# Patient Record
Sex: FEMALE | Race: WHITE | NOT HISPANIC OR LATINO | Employment: UNEMPLOYED | URBAN - METROPOLITAN AREA
[De-identification: names, ages, dates, MRNs, and addresses within clinical notes are randomized per-mention and may not be internally consistent; named-entity substitution may affect disease eponyms.]

---

## 2017-01-27 ENCOUNTER — COMMUNICATION - HEALTHEAST (OUTPATIENT)
Dept: FAMILY MEDICINE | Facility: CLINIC | Age: 37
End: 2017-01-27

## 2017-01-27 DIAGNOSIS — F41.1 GENERALIZED ANXIETY DISORDER: ICD-10-CM

## 2017-03-19 ENCOUNTER — COMMUNICATION - HEALTHEAST (OUTPATIENT)
Dept: FAMILY MEDICINE | Facility: CLINIC | Age: 37
End: 2017-03-19

## 2017-03-19 DIAGNOSIS — F41.1 GENERALIZED ANXIETY DISORDER: ICD-10-CM

## 2017-05-08 ENCOUNTER — COMMUNICATION - HEALTHEAST (OUTPATIENT)
Dept: FAMILY MEDICINE | Facility: CLINIC | Age: 37
End: 2017-05-08

## 2017-05-08 DIAGNOSIS — F41.1 GENERALIZED ANXIETY DISORDER: ICD-10-CM

## 2017-05-09 ENCOUNTER — COMMUNICATION - HEALTHEAST (OUTPATIENT)
Dept: FAMILY MEDICINE | Facility: CLINIC | Age: 37
End: 2017-05-09

## 2017-06-08 ENCOUNTER — OFFICE VISIT - HEALTHEAST (OUTPATIENT)
Dept: FAMILY MEDICINE | Facility: CLINIC | Age: 37
End: 2017-06-08

## 2017-06-08 DIAGNOSIS — F41.1 GENERALIZED ANXIETY DISORDER: ICD-10-CM

## 2017-06-08 ASSESSMENT — MIFFLIN-ST. JEOR: SCORE: 1360.78

## 2017-06-22 ENCOUNTER — COMMUNICATION - HEALTHEAST (OUTPATIENT)
Dept: FAMILY MEDICINE | Facility: CLINIC | Age: 37
End: 2017-06-22

## 2017-12-17 ENCOUNTER — COMMUNICATION - HEALTHEAST (OUTPATIENT)
Dept: FAMILY MEDICINE | Facility: CLINIC | Age: 37
End: 2017-12-17

## 2017-12-17 DIAGNOSIS — F41.1 GENERALIZED ANXIETY DISORDER: ICD-10-CM

## 2018-06-05 ENCOUNTER — COMMUNICATION - HEALTHEAST (OUTPATIENT)
Dept: FAMILY MEDICINE | Facility: CLINIC | Age: 38
End: 2018-06-05

## 2018-06-05 DIAGNOSIS — F41.1 GENERALIZED ANXIETY DISORDER: ICD-10-CM

## 2018-07-15 ENCOUNTER — COMMUNICATION - HEALTHEAST (OUTPATIENT)
Dept: FAMILY MEDICINE | Facility: CLINIC | Age: 38
End: 2018-07-15

## 2018-07-16 ENCOUNTER — COMMUNICATION - HEALTHEAST (OUTPATIENT)
Dept: FAMILY MEDICINE | Facility: CLINIC | Age: 38
End: 2018-07-16

## 2018-09-03 ENCOUNTER — COMMUNICATION - HEALTHEAST (OUTPATIENT)
Dept: FAMILY MEDICINE | Facility: CLINIC | Age: 38
End: 2018-09-03

## 2018-09-03 DIAGNOSIS — F41.1 GENERALIZED ANXIETY DISORDER: ICD-10-CM

## 2018-10-01 ENCOUNTER — OFFICE VISIT - HEALTHEAST (OUTPATIENT)
Dept: FAMILY MEDICINE | Facility: CLINIC | Age: 38
End: 2018-10-01

## 2018-10-01 DIAGNOSIS — G47.00 INSOMNIA: ICD-10-CM

## 2018-10-01 DIAGNOSIS — Z00.00 ROUTINE GENERAL MEDICAL EXAMINATION AT A HEALTH CARE FACILITY: ICD-10-CM

## 2018-10-01 DIAGNOSIS — F41.1 GENERALIZED ANXIETY DISORDER: ICD-10-CM

## 2018-10-01 ASSESSMENT — MIFFLIN-ST. JEOR: SCORE: 1383.46

## 2018-10-11 ENCOUNTER — COMMUNICATION - HEALTHEAST (OUTPATIENT)
Dept: SCHEDULING | Facility: CLINIC | Age: 38
End: 2018-10-11

## 2018-10-11 ENCOUNTER — OFFICE VISIT - HEALTHEAST (OUTPATIENT)
Dept: FAMILY MEDICINE | Facility: CLINIC | Age: 38
End: 2018-10-11

## 2018-10-11 ENCOUNTER — COMMUNICATION - HEALTHEAST (OUTPATIENT)
Dept: FAMILY MEDICINE | Facility: CLINIC | Age: 38
End: 2018-10-11

## 2018-10-11 DIAGNOSIS — M25.561 ACUTE PAIN OF RIGHT KNEE: ICD-10-CM

## 2018-10-23 ENCOUNTER — COMMUNICATION - HEALTHEAST (OUTPATIENT)
Dept: FAMILY MEDICINE | Facility: CLINIC | Age: 38
End: 2018-10-23

## 2018-10-23 DIAGNOSIS — M25.569 KNEE PAIN: ICD-10-CM

## 2018-11-02 ENCOUNTER — AMBULATORY - HEALTHEAST (OUTPATIENT)
Dept: NURSING | Facility: CLINIC | Age: 38
End: 2018-11-02

## 2018-11-20 ENCOUNTER — COMMUNICATION - HEALTHEAST (OUTPATIENT)
Dept: FAMILY MEDICINE | Facility: CLINIC | Age: 38
End: 2018-11-20

## 2018-11-20 DIAGNOSIS — F41.1 GENERALIZED ANXIETY DISORDER: ICD-10-CM

## 2019-01-06 ENCOUNTER — COMMUNICATION - HEALTHEAST (OUTPATIENT)
Dept: FAMILY MEDICINE | Facility: CLINIC | Age: 39
End: 2019-01-06

## 2019-01-06 DIAGNOSIS — F41.1 GENERALIZED ANXIETY DISORDER: ICD-10-CM

## 2019-02-28 ENCOUNTER — COMMUNICATION - HEALTHEAST (OUTPATIENT)
Dept: FAMILY MEDICINE | Facility: CLINIC | Age: 39
End: 2019-02-28

## 2019-02-28 DIAGNOSIS — F41.1 GENERALIZED ANXIETY DISORDER: ICD-10-CM

## 2019-03-28 ENCOUNTER — COMMUNICATION - HEALTHEAST (OUTPATIENT)
Dept: FAMILY MEDICINE | Facility: CLINIC | Age: 39
End: 2019-03-28

## 2019-03-28 DIAGNOSIS — F51.01 PRIMARY INSOMNIA: ICD-10-CM

## 2019-04-13 ENCOUNTER — COMMUNICATION - HEALTHEAST (OUTPATIENT)
Dept: SCHEDULING | Facility: CLINIC | Age: 39
End: 2019-04-13

## 2019-04-29 ENCOUNTER — COMMUNICATION - HEALTHEAST (OUTPATIENT)
Dept: FAMILY MEDICINE | Facility: CLINIC | Age: 39
End: 2019-04-29

## 2019-04-29 DIAGNOSIS — F41.1 GENERALIZED ANXIETY DISORDER: ICD-10-CM

## 2019-06-10 ENCOUNTER — OFFICE VISIT - HEALTHEAST (OUTPATIENT)
Dept: PHYSICAL THERAPY | Facility: REHABILITATION | Age: 39
End: 2019-06-10

## 2019-06-10 DIAGNOSIS — G89.29 CHRONIC PAIN OF RIGHT KNEE: ICD-10-CM

## 2019-06-10 DIAGNOSIS — M25.561 CHRONIC PAIN OF RIGHT KNEE: ICD-10-CM

## 2019-06-10 DIAGNOSIS — M62.81 GENERALIZED MUSCLE WEAKNESS: ICD-10-CM

## 2019-06-10 DIAGNOSIS — M25.661 DECREASED RANGE OF MOTION (ROM) OF RIGHT KNEE: ICD-10-CM

## 2019-06-17 ENCOUNTER — COMMUNICATION - HEALTHEAST (OUTPATIENT)
Dept: FAMILY MEDICINE | Facility: CLINIC | Age: 39
End: 2019-06-17

## 2019-06-17 DIAGNOSIS — F41.1 GENERALIZED ANXIETY DISORDER: ICD-10-CM

## 2019-08-01 ENCOUNTER — OFFICE VISIT - HEALTHEAST (OUTPATIENT)
Dept: FAMILY MEDICINE | Facility: CLINIC | Age: 39
End: 2019-08-01

## 2019-08-01 DIAGNOSIS — F41.1 GENERALIZED ANXIETY DISORDER: ICD-10-CM

## 2019-08-05 ENCOUNTER — COMMUNICATION - HEALTHEAST (OUTPATIENT)
Dept: FAMILY MEDICINE | Facility: CLINIC | Age: 39
End: 2019-08-05

## 2019-08-19 ENCOUNTER — COMMUNICATION - HEALTHEAST (OUTPATIENT)
Dept: FAMILY MEDICINE | Facility: CLINIC | Age: 39
End: 2019-08-19

## 2019-08-26 ENCOUNTER — COMMUNICATION - HEALTHEAST (OUTPATIENT)
Dept: FAMILY MEDICINE | Facility: CLINIC | Age: 39
End: 2019-08-26

## 2019-08-26 DIAGNOSIS — F41.1 GENERALIZED ANXIETY DISORDER: ICD-10-CM

## 2019-09-12 ENCOUNTER — OFFICE VISIT - HEALTHEAST (OUTPATIENT)
Dept: FAMILY MEDICINE | Facility: CLINIC | Age: 39
End: 2019-09-12

## 2019-09-12 DIAGNOSIS — F41.1 GENERALIZED ANXIETY DISORDER: ICD-10-CM

## 2019-09-12 ASSESSMENT — ANXIETY QUESTIONNAIRES
6. BECOMING EASILY ANNOYED OR IRRITABLE: NEARLY EVERY DAY
4. TROUBLE RELAXING: NEARLY EVERY DAY
5. BEING SO RESTLESS THAT IT IS HARD TO SIT STILL: SEVERAL DAYS
IF YOU CHECKED OFF ANY PROBLEMS ON THIS QUESTIONNAIRE, HOW DIFFICULT HAVE THESE PROBLEMS MADE IT FOR YOU TO DO YOUR WORK, TAKE CARE OF THINGS AT HOME, OR GET ALONG WITH OTHER PEOPLE: SOMEWHAT DIFFICULT
GAD7 TOTAL SCORE: 17
1. FEELING NERVOUS, ANXIOUS, OR ON EDGE: NEARLY EVERY DAY
7. FEELING AFRAID AS IF SOMETHING AWFUL MIGHT HAPPEN: SEVERAL DAYS
3. WORRYING TOO MUCH ABOUT DIFFERENT THINGS: NEARLY EVERY DAY
2. NOT BEING ABLE TO STOP OR CONTROL WORRYING: NEARLY EVERY DAY

## 2019-09-25 ENCOUNTER — COMMUNICATION - HEALTHEAST (OUTPATIENT)
Dept: FAMILY MEDICINE | Facility: CLINIC | Age: 39
End: 2019-09-25

## 2019-09-25 DIAGNOSIS — F41.1 GENERALIZED ANXIETY DISORDER: ICD-10-CM

## 2019-10-27 ENCOUNTER — COMMUNICATION - HEALTHEAST (OUTPATIENT)
Dept: FAMILY MEDICINE | Facility: CLINIC | Age: 39
End: 2019-10-27

## 2019-10-27 DIAGNOSIS — F51.01 PRIMARY INSOMNIA: ICD-10-CM

## 2019-12-11 ENCOUNTER — COMMUNICATION - HEALTHEAST (OUTPATIENT)
Dept: FAMILY MEDICINE | Facility: CLINIC | Age: 39
End: 2019-12-11

## 2019-12-11 DIAGNOSIS — F41.1 GENERALIZED ANXIETY DISORDER: ICD-10-CM

## 2019-12-13 ENCOUNTER — COMMUNICATION - HEALTHEAST (OUTPATIENT)
Dept: FAMILY MEDICINE | Facility: CLINIC | Age: 39
End: 2019-12-13

## 2019-12-13 DIAGNOSIS — F41.1 GENERALIZED ANXIETY DISORDER: ICD-10-CM

## 2019-12-16 ENCOUNTER — COMMUNICATION - HEALTHEAST (OUTPATIENT)
Dept: FAMILY MEDICINE | Facility: CLINIC | Age: 39
End: 2019-12-16

## 2020-01-15 ENCOUNTER — COMMUNICATION - HEALTHEAST (OUTPATIENT)
Dept: FAMILY MEDICINE | Facility: CLINIC | Age: 40
End: 2020-01-15

## 2020-01-15 DIAGNOSIS — F41.1 GENERALIZED ANXIETY DISORDER: ICD-10-CM

## 2020-01-17 ENCOUNTER — COMMUNICATION - HEALTHEAST (OUTPATIENT)
Dept: FAMILY MEDICINE | Facility: CLINIC | Age: 40
End: 2020-01-17

## 2020-01-17 DIAGNOSIS — F41.1 GENERALIZED ANXIETY DISORDER: ICD-10-CM

## 2020-02-19 ENCOUNTER — COMMUNICATION - HEALTHEAST (OUTPATIENT)
Dept: FAMILY MEDICINE | Facility: CLINIC | Age: 40
End: 2020-02-19

## 2020-02-19 DIAGNOSIS — F41.1 GENERALIZED ANXIETY DISORDER: ICD-10-CM

## 2020-02-26 ENCOUNTER — OFFICE VISIT - HEALTHEAST (OUTPATIENT)
Dept: FAMILY MEDICINE | Facility: CLINIC | Age: 40
End: 2020-02-26

## 2020-02-26 DIAGNOSIS — F41.1 GENERALIZED ANXIETY DISORDER: ICD-10-CM

## 2020-02-26 DIAGNOSIS — Z00.00 ROUTINE GENERAL MEDICAL EXAMINATION AT A HEALTH CARE FACILITY: ICD-10-CM

## 2020-02-26 DIAGNOSIS — F51.01 PRIMARY INSOMNIA: ICD-10-CM

## 2020-02-26 DIAGNOSIS — E78.49 OTHER HYPERLIPIDEMIA: ICD-10-CM

## 2020-02-26 ASSESSMENT — ANXIETY QUESTIONNAIRES
GAD7 TOTAL SCORE: 17
3. WORRYING TOO MUCH ABOUT DIFFERENT THINGS: NEARLY EVERY DAY
4. TROUBLE RELAXING: NEARLY EVERY DAY
7. FEELING AFRAID AS IF SOMETHING AWFUL MIGHT HAPPEN: SEVERAL DAYS
2. NOT BEING ABLE TO STOP OR CONTROL WORRYING: MORE THAN HALF THE DAYS
IF YOU CHECKED OFF ANY PROBLEMS ON THIS QUESTIONNAIRE, HOW DIFFICULT HAVE THESE PROBLEMS MADE IT FOR YOU TO DO YOUR WORK, TAKE CARE OF THINGS AT HOME, OR GET ALONG WITH OTHER PEOPLE: VERY DIFFICULT
5. BEING SO RESTLESS THAT IT IS HARD TO SIT STILL: MORE THAN HALF THE DAYS
6. BECOMING EASILY ANNOYED OR IRRITABLE: NEARLY EVERY DAY
1. FEELING NERVOUS, ANXIOUS, OR ON EDGE: NEARLY EVERY DAY

## 2020-02-26 ASSESSMENT — MIFFLIN-ST. JEOR: SCORE: 1378.47

## 2020-03-04 ENCOUNTER — COMMUNICATION - HEALTHEAST (OUTPATIENT)
Dept: FAMILY MEDICINE | Facility: CLINIC | Age: 40
End: 2020-03-04

## 2020-03-16 ENCOUNTER — COMMUNICATION - HEALTHEAST (OUTPATIENT)
Dept: FAMILY MEDICINE | Facility: CLINIC | Age: 40
End: 2020-03-16

## 2020-03-16 DIAGNOSIS — F51.01 PRIMARY INSOMNIA: ICD-10-CM

## 2020-03-17 ENCOUNTER — COMMUNICATION - HEALTHEAST (OUTPATIENT)
Dept: SCHEDULING | Facility: CLINIC | Age: 40
End: 2020-03-17

## 2020-03-17 DIAGNOSIS — F41.1 GENERALIZED ANXIETY DISORDER: ICD-10-CM

## 2020-04-27 ENCOUNTER — COMMUNICATION - HEALTHEAST (OUTPATIENT)
Dept: SCHEDULING | Facility: CLINIC | Age: 40
End: 2020-04-27

## 2020-04-27 DIAGNOSIS — F41.1 GENERALIZED ANXIETY DISORDER: ICD-10-CM

## 2020-05-21 ENCOUNTER — COMMUNICATION - HEALTHEAST (OUTPATIENT)
Dept: FAMILY MEDICINE | Facility: CLINIC | Age: 40
End: 2020-05-21

## 2020-05-21 DIAGNOSIS — F41.1 GENERALIZED ANXIETY DISORDER: ICD-10-CM

## 2020-06-20 ENCOUNTER — COMMUNICATION - HEALTHEAST (OUTPATIENT)
Dept: FAMILY MEDICINE | Facility: CLINIC | Age: 40
End: 2020-06-20

## 2020-06-20 DIAGNOSIS — F41.1 GENERALIZED ANXIETY DISORDER: ICD-10-CM

## 2020-07-20 ENCOUNTER — COMMUNICATION - HEALTHEAST (OUTPATIENT)
Dept: FAMILY MEDICINE | Facility: CLINIC | Age: 40
End: 2020-07-20

## 2020-07-20 DIAGNOSIS — F41.1 GENERALIZED ANXIETY DISORDER: ICD-10-CM

## 2020-07-20 DIAGNOSIS — F51.01 PRIMARY INSOMNIA: ICD-10-CM

## 2020-07-23 ENCOUNTER — COMMUNICATION - HEALTHEAST (OUTPATIENT)
Dept: FAMILY MEDICINE | Facility: CLINIC | Age: 40
End: 2020-07-23

## 2020-07-23 DIAGNOSIS — F51.01 PRIMARY INSOMNIA: ICD-10-CM

## 2020-07-27 ENCOUNTER — AMBULATORY - HEALTHEAST (OUTPATIENT)
Dept: FAMILY MEDICINE | Facility: CLINIC | Age: 40
End: 2020-07-27

## 2020-07-27 DIAGNOSIS — F51.01 PRIMARY INSOMNIA: ICD-10-CM

## 2020-08-17 ENCOUNTER — COMMUNICATION - HEALTHEAST (OUTPATIENT)
Dept: FAMILY MEDICINE | Facility: CLINIC | Age: 40
End: 2020-08-17

## 2020-08-17 DIAGNOSIS — F41.1 GENERALIZED ANXIETY DISORDER: ICD-10-CM

## 2020-09-14 ENCOUNTER — COMMUNICATION - HEALTHEAST (OUTPATIENT)
Dept: FAMILY MEDICINE | Facility: CLINIC | Age: 40
End: 2020-09-14

## 2020-09-14 DIAGNOSIS — F41.1 GENERALIZED ANXIETY DISORDER: ICD-10-CM

## 2020-10-09 ENCOUNTER — COMMUNICATION - HEALTHEAST (OUTPATIENT)
Dept: FAMILY MEDICINE | Facility: CLINIC | Age: 40
End: 2020-10-09

## 2020-10-09 DIAGNOSIS — F41.1 GENERALIZED ANXIETY DISORDER: ICD-10-CM

## 2020-11-05 ENCOUNTER — COMMUNICATION - HEALTHEAST (OUTPATIENT)
Dept: FAMILY MEDICINE | Facility: CLINIC | Age: 40
End: 2020-11-05

## 2020-11-05 DIAGNOSIS — F41.1 GENERALIZED ANXIETY DISORDER: ICD-10-CM

## 2020-11-09 ENCOUNTER — COMMUNICATION - HEALTHEAST (OUTPATIENT)
Dept: FAMILY MEDICINE | Facility: CLINIC | Age: 40
End: 2020-11-09

## 2020-11-09 DIAGNOSIS — F51.01 PRIMARY INSOMNIA: ICD-10-CM

## 2020-12-01 ENCOUNTER — COMMUNICATION - HEALTHEAST (OUTPATIENT)
Dept: FAMILY MEDICINE | Facility: CLINIC | Age: 40
End: 2020-12-01

## 2020-12-01 DIAGNOSIS — F41.1 GENERALIZED ANXIETY DISORDER: ICD-10-CM

## 2020-12-29 ENCOUNTER — COMMUNICATION - HEALTHEAST (OUTPATIENT)
Dept: FAMILY MEDICINE | Facility: CLINIC | Age: 40
End: 2020-12-29

## 2020-12-29 DIAGNOSIS — F41.1 GENERALIZED ANXIETY DISORDER: ICD-10-CM

## 2021-01-25 ENCOUNTER — COMMUNICATION - HEALTHEAST (OUTPATIENT)
Dept: FAMILY MEDICINE | Facility: CLINIC | Age: 41
End: 2021-01-25

## 2021-01-25 DIAGNOSIS — F51.01 PRIMARY INSOMNIA: ICD-10-CM

## 2021-01-25 DIAGNOSIS — F41.1 GENERALIZED ANXIETY DISORDER: ICD-10-CM

## 2021-01-29 ENCOUNTER — COMMUNICATION - HEALTHEAST (OUTPATIENT)
Dept: FAMILY MEDICINE | Facility: CLINIC | Age: 41
End: 2021-01-29

## 2021-01-29 DIAGNOSIS — F41.1 GENERALIZED ANXIETY DISORDER: ICD-10-CM

## 2021-02-01 ENCOUNTER — OFFICE VISIT - HEALTHEAST (OUTPATIENT)
Dept: FAMILY MEDICINE | Facility: CLINIC | Age: 41
End: 2021-02-01

## 2021-02-01 ENCOUNTER — COMMUNICATION - HEALTHEAST (OUTPATIENT)
Dept: FAMILY MEDICINE | Facility: CLINIC | Age: 41
End: 2021-02-01

## 2021-02-01 DIAGNOSIS — N94.3 PMS (PREMENSTRUAL SYNDROME): ICD-10-CM

## 2021-02-01 DIAGNOSIS — F51.01 PRIMARY INSOMNIA: ICD-10-CM

## 2021-02-01 DIAGNOSIS — F41.1 GENERALIZED ANXIETY DISORDER: ICD-10-CM

## 2021-02-01 DIAGNOSIS — Z00.00 ROUTINE GENERAL MEDICAL EXAMINATION AT A HEALTH CARE FACILITY: ICD-10-CM

## 2021-02-01 LAB
CHOLEST SERPL-MCNC: 248 MG/DL
FASTING STATUS PATIENT QL REPORTED: YES
HDLC SERPL-MCNC: 69 MG/DL
LDLC SERPL CALC-MCNC: 161 MG/DL
TRIGL SERPL-MCNC: 92 MG/DL
TSH SERPL DL<=0.005 MIU/L-ACNC: 0.49 UIU/ML (ref 0.3–5)

## 2021-02-01 ASSESSMENT — ANXIETY QUESTIONNAIRES
5. BEING SO RESTLESS THAT IT IS HARD TO SIT STILL: NEARLY EVERY DAY
7. FEELING AFRAID AS IF SOMETHING AWFUL MIGHT HAPPEN: NEARLY EVERY DAY
3. WORRYING TOO MUCH ABOUT DIFFERENT THINGS: NEARLY EVERY DAY
GAD7 TOTAL SCORE: 21
1. FEELING NERVOUS, ANXIOUS, OR ON EDGE: NEARLY EVERY DAY
IF YOU CHECKED OFF ANY PROBLEMS ON THIS QUESTIONNAIRE, HOW DIFFICULT HAVE THESE PROBLEMS MADE IT FOR YOU TO DO YOUR WORK, TAKE CARE OF THINGS AT HOME, OR GET ALONG WITH OTHER PEOPLE: VERY DIFFICULT
4. TROUBLE RELAXING: NEARLY EVERY DAY
2. NOT BEING ABLE TO STOP OR CONTROL WORRYING: NEARLY EVERY DAY
6. BECOMING EASILY ANNOYED OR IRRITABLE: NEARLY EVERY DAY

## 2021-02-01 ASSESSMENT — PATIENT HEALTH QUESTIONNAIRE - PHQ9: SUM OF ALL RESPONSES TO PHQ QUESTIONS 1-9: 15

## 2021-02-01 ASSESSMENT — MIFFLIN-ST. JEOR: SCORE: 1377.56

## 2021-02-01 NOTE — ASSESSMENT & PLAN NOTE
The patient would like to give a trial to the NuvaRing to see if this would help mitigate PMS symptoms and some of the emotional lability and increased anxiety around that time.  A prescription was given with directions to skip the week off of the NuvaRing and insert a new one every 3 weeks.

## 2021-02-01 NOTE — ASSESSMENT & PLAN NOTE
The patient continues to have high levels of anxiety for which she takes Xanax sparingly as needed.  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  We have discussed and have tried SSRI medications in the past without good tolerability.  Continue as is and I encouraged her to try to connect with her therapist on a more regular basis.

## 2021-02-02 ENCOUNTER — COMMUNICATION - HEALTHEAST (OUTPATIENT)
Dept: FAMILY MEDICINE | Facility: CLINIC | Age: 41
End: 2021-02-02

## 2021-02-02 LAB
HPV SOURCE: NORMAL
HUMAN PAPILLOMA VIRUS 16 DNA: NEGATIVE
HUMAN PAPILLOMA VIRUS 18 DNA: NEGATIVE
HUMAN PAPILLOMA VIRUS FINAL DIAGNOSIS: NORMAL
HUMAN PAPILLOMA VIRUS OTHER HR: NEGATIVE
SPECIMEN DESCRIPTION: NORMAL

## 2021-02-10 LAB
BKR LAB AP ABNORMAL BLEEDING: NO
BKR LAB AP BIRTH CONTROL/HORMONES: NORMAL
BKR LAB AP CERVICAL APPEARANCE: NORMAL
BKR LAB AP GYN ADEQUACY: NORMAL
BKR LAB AP GYN INTERPRETATION: NORMAL
BKR LAB AP HPV REFLEX: NORMAL
BKR LAB AP LMP: NORMAL
BKR LAB AP PATIENT STATUS: NORMAL
BKR LAB AP PREVIOUS ABNORMAL: NORMAL
BKR LAB AP PREVIOUS NORMAL: 2015
HIGH RISK?: NO
PATH REPORT.COMMENTS IMP SPEC: NORMAL
RESULT FLAG (HE HISTORICAL CONVERSION): NORMAL

## 2021-02-20 ENCOUNTER — COMMUNICATION - HEALTHEAST (OUTPATIENT)
Dept: FAMILY MEDICINE | Facility: CLINIC | Age: 41
End: 2021-02-20

## 2021-02-20 DIAGNOSIS — F41.1 GENERALIZED ANXIETY DISORDER: ICD-10-CM

## 2021-02-24 ENCOUNTER — COMMUNICATION - HEALTHEAST (OUTPATIENT)
Dept: FAMILY MEDICINE | Facility: CLINIC | Age: 41
End: 2021-02-24

## 2021-03-02 ENCOUNTER — COMMUNICATION - HEALTHEAST (OUTPATIENT)
Dept: FAMILY MEDICINE | Facility: CLINIC | Age: 41
End: 2021-03-02

## 2021-03-02 DIAGNOSIS — F41.1 GENERALIZED ANXIETY DISORDER: ICD-10-CM

## 2021-03-04 ENCOUNTER — COMMUNICATION - HEALTHEAST (OUTPATIENT)
Dept: FAMILY MEDICINE | Facility: CLINIC | Age: 41
End: 2021-03-04

## 2021-03-17 ENCOUNTER — COMMUNICATION - HEALTHEAST (OUTPATIENT)
Dept: FAMILY MEDICINE | Facility: CLINIC | Age: 41
End: 2021-03-17

## 2021-03-19 ENCOUNTER — OFFICE VISIT - HEALTHEAST (OUTPATIENT)
Dept: FAMILY MEDICINE | Facility: CLINIC | Age: 41
End: 2021-03-19

## 2021-03-19 DIAGNOSIS — N94.3 PMS (PREMENSTRUAL SYNDROME): ICD-10-CM

## 2021-03-19 DIAGNOSIS — N92.6 IRREGULAR MENSTRUAL BLEEDING: ICD-10-CM

## 2021-03-23 ENCOUNTER — COMMUNICATION - HEALTHEAST (OUTPATIENT)
Dept: FAMILY MEDICINE | Facility: CLINIC | Age: 41
End: 2021-03-23

## 2021-03-23 DIAGNOSIS — F41.1 GENERALIZED ANXIETY DISORDER: ICD-10-CM

## 2021-03-23 DIAGNOSIS — F51.01 PRIMARY INSOMNIA: ICD-10-CM

## 2021-04-09 ENCOUNTER — COMMUNICATION - HEALTHEAST (OUTPATIENT)
Dept: FAMILY MEDICINE | Facility: CLINIC | Age: 41
End: 2021-04-09

## 2021-04-09 ENCOUNTER — OFFICE VISIT - HEALTHEAST (OUTPATIENT)
Dept: FAMILY MEDICINE | Facility: CLINIC | Age: 41
End: 2021-04-09

## 2021-04-09 DIAGNOSIS — R30.0 DYSURIA: ICD-10-CM

## 2021-04-09 DIAGNOSIS — N30.00 ACUTE CYSTITIS WITHOUT HEMATURIA: ICD-10-CM

## 2021-04-09 LAB
ALBUMIN UR-MCNC: NEGATIVE G/DL
APPEARANCE UR: ABNORMAL
BILIRUB UR QL STRIP: NEGATIVE
COLOR UR AUTO: YELLOW
GLUCOSE UR STRIP-MCNC: NEGATIVE MG/DL
HGB UR QL STRIP: ABNORMAL
KETONES UR STRIP-MCNC: NEGATIVE MG/DL
LEUKOCYTE ESTERASE UR QL STRIP: ABNORMAL
NITRATE UR QL: NEGATIVE
PH UR STRIP: 5.5 [PH] (ref 5–8)
SP GR UR STRIP: <=1.005 (ref 1–1.03)
UROBILINOGEN UR STRIP-ACNC: ABNORMAL

## 2021-04-12 LAB — BACTERIA SPEC CULT: ABNORMAL

## 2021-04-14 ENCOUNTER — COMMUNICATION - HEALTHEAST (OUTPATIENT)
Dept: FAMILY MEDICINE | Facility: CLINIC | Age: 41
End: 2021-04-14

## 2021-04-15 ENCOUNTER — COMMUNICATION - HEALTHEAST (OUTPATIENT)
Dept: FAMILY MEDICINE | Facility: CLINIC | Age: 41
End: 2021-04-15

## 2021-04-15 DIAGNOSIS — F41.1 GENERALIZED ANXIETY DISORDER: ICD-10-CM

## 2021-04-16 ENCOUNTER — OFFICE VISIT - HEALTHEAST (OUTPATIENT)
Dept: FAMILY MEDICINE | Facility: CLINIC | Age: 41
End: 2021-04-16

## 2021-04-16 DIAGNOSIS — N76.0 BACTERIAL VAGINITIS: ICD-10-CM

## 2021-04-16 DIAGNOSIS — B96.89 BACTERIAL VAGINITIS: ICD-10-CM

## 2021-04-16 DIAGNOSIS — R30.0 DYSURIA: ICD-10-CM

## 2021-04-16 LAB
ALBUMIN UR-MCNC: NEGATIVE G/DL
APPEARANCE UR: CLEAR
BACTERIA #/AREA URNS HPF: ABNORMAL /[HPF]
BILIRUB UR QL STRIP: NEGATIVE
CLUE CELLS: ABNORMAL
COLOR UR AUTO: YELLOW
GLUCOSE UR STRIP-MCNC: NEGATIVE MG/DL
HGB UR QL STRIP: ABNORMAL
KETONES UR STRIP-MCNC: NEGATIVE MG/DL
LEUKOCYTE ESTERASE UR QL STRIP: ABNORMAL
NITRATE UR QL: NEGATIVE
PH UR STRIP: 6 [PH] (ref 5–8)
RBC #/AREA URNS AUTO: ABNORMAL HPF
SP GR UR STRIP: <=1.005 (ref 1–1.03)
SQUAMOUS #/AREA URNS AUTO: ABNORMAL LPF
TRICHOMONAS, WET PREP: ABNORMAL
UROBILINOGEN UR STRIP-ACNC: ABNORMAL
WBC #/AREA URNS AUTO: ABNORMAL HPF
YEAST, WET PREP: ABNORMAL

## 2021-04-17 LAB — BACTERIA SPEC CULT: NO GROWTH

## 2021-04-19 ENCOUNTER — COMMUNICATION - HEALTHEAST (OUTPATIENT)
Dept: FAMILY MEDICINE | Facility: CLINIC | Age: 41
End: 2021-04-19

## 2021-04-19 DIAGNOSIS — B96.89 BACTERIAL VAGINITIS: ICD-10-CM

## 2021-04-19 DIAGNOSIS — N76.0 BACTERIAL VAGINITIS: ICD-10-CM

## 2021-04-19 DIAGNOSIS — F41.1 GENERALIZED ANXIETY DISORDER: ICD-10-CM

## 2021-04-20 ENCOUNTER — AMBULATORY - HEALTHEAST (OUTPATIENT)
Dept: FAMILY MEDICINE | Facility: CLINIC | Age: 41
End: 2021-04-20

## 2021-04-20 DIAGNOSIS — F41.1 GENERALIZED ANXIETY DISORDER: ICD-10-CM

## 2021-04-20 LAB
C TRACH DNA SPEC QL PROBE+SIG AMP: NEGATIVE
N GONORRHOEA DNA SPEC QL NAA+PROBE: NEGATIVE

## 2021-05-13 ENCOUNTER — COMMUNICATION - HEALTHEAST (OUTPATIENT)
Dept: FAMILY MEDICINE | Facility: CLINIC | Age: 41
End: 2021-05-13

## 2021-05-13 ENCOUNTER — COMMUNICATION - HEALTHEAST (OUTPATIENT)
Dept: ADMINISTRATIVE | Facility: CLINIC | Age: 41
End: 2021-05-13

## 2021-05-13 DIAGNOSIS — F51.01 PRIMARY INSOMNIA: ICD-10-CM

## 2021-05-13 DIAGNOSIS — F41.1 GENERALIZED ANXIETY DISORDER: ICD-10-CM

## 2021-05-13 RX ORDER — ZOLPIDEM TARTRATE 5 MG/1
TABLET ORAL
Qty: 15 TABLET | Refills: 0 | Status: SHIPPED | OUTPATIENT
Start: 2021-05-13 | End: 2021-08-29

## 2021-05-27 ASSESSMENT — PATIENT HEALTH QUESTIONNAIRE - PHQ9: SUM OF ALL RESPONSES TO PHQ QUESTIONS 1-9: 15

## 2021-05-27 NOTE — TELEPHONE ENCOUNTER
data    Zolpedem  11/23/18 - 30 7/16/18 - 30    Alprazolam  3/1/19 - 30  1/7/19 - 30  11/23/18- 30

## 2021-05-27 NOTE — TELEPHONE ENCOUNTER
Controlled Substance Refill Request  Medication:   Requested Prescriptions     Pending Prescriptions Disp Refills     zolpidem (AMBIEN) 5 MG tablet [Pharmacy Med Name: ZOLPIDEM TARTRATE 5 MG TABLET] 90 tablet      Sig: TAKE 1/2 TO 1 TABLET BY MOUTH AT BEDTIME AS NEEDED     Date Last Fill: 11/23/18  Pharmacy: [facility]  Submit electronically to pharmacy  Controlled Substance Agreement on File:   Encounter-Level CSA Scan Date:    There are no encounter-level csa scan date.       Last office visit: 6/8/2017 Lavonne Fernandez MD

## 2021-05-27 NOTE — TELEPHONE ENCOUNTER
"Ear pain and throat soreness one side. For about two weeks now. \"Annoying.\" No fever.         Reason for Disposition    Earache  (Exceptions: brief ear pain of < 60 minutes duration, earache occurring during air travel    Protocols used: EARACHE-A-AH      "

## 2021-05-28 ASSESSMENT — ANXIETY QUESTIONNAIRES
GAD7 TOTAL SCORE: 21
GAD7 TOTAL SCORE: 17

## 2021-05-28 NOTE — TELEPHONE ENCOUNTER
Controlled Substance Refill Request  Medication:   Requested Prescriptions     Pending Prescriptions Disp Refills     ALPRAZolam (XANAX) 1 MG tablet [Pharmacy Med Name: ALPRAZOLAM 1 MG TABLET] 30 tablet 0     Sig: TAKE 1 TABLET BY MOUTH TWICE A DAY     Date Last Fill: 3/1/19  [id number]  Submit electronically to pharmacy  Controlled Substance Agreement on File:   Encounter-Level CSA Scan Date:    There are no encounter-level csa scan date.       Last office visit: Last office visit pertaining to requested medication was 10/11/18.

## 2021-05-29 NOTE — TELEPHONE ENCOUNTER
Controlled Substance Refill Request  Medication:   Requested Prescriptions     Pending Prescriptions Disp Refills     ALPRAZolam (XANAX) 1 MG tablet [Pharmacy Med Name: ALPRAZOLAM 1 MG TABLET] 30 tablet 0     Sig: TAKE 1 TABLET BY MOUTH TWICE A DAY     Date Last Fill: 4/30/19  [facility]   [facility] to [facility]    Controlled Substance Agreement on File:   Encounter-Level CSA Scan Date:    There are no encounter-level csa scan date.       Last office visit with primary: Visit date not found

## 2021-05-29 NOTE — PROGRESS NOTES
[facility] Discharge Summary    Patient Name: Merle Jennings  Date: 2/10/2020  Referring provider: Lavonne Fernandez MD  Visit Diagnosis:     ICD-10-CM    1. Chronic pain of right knee M25.561     G89.29    2. Generalized muscle weakness M62.81    3. Decreased range of motion (ROM) of right knee M25.661        Goal Status:  See status in note below.    Patient was seen for 1 visit and did not return.    Therapy will be discontinued at this time.  The patient will need a new referral to resume.    Thank you for your referral.  Maggi Buck PT, DPT, OCS, CLT  2/10/2020   9:28 AM      [facility]   Initial Evaluation    Patient Name: Merle Jennings  Date of evaluation: 6/10/2019  Visit number: 1/4  Referring Provider: Lavonne Fernandez MD  Referring Diagnosis: Knee pain  Visit Diagnosis:     ICD-10-CM    1. Chronic pain of right knee M25.561     G89.29    2. Generalized muscle weakness M62.81    3. Decreased range of motion (ROM) of right knee M25.661        Assessment:        Merle Jennings is a 39 y.o. female who presents to therapy today with chief complaints of R knee pain with intermittent low back pain with some R radiating leg pain. Onset date of sx was Nov 2018 when pt was doing yoga and felt something pull in her R knee.  Pt reported h/o back pain and migraines that were both helped with yoga in the past but pt hadn't been performing due to knee injury.  Pain symptoms are mild to moderate in low back, R hip and can be in R knee.  Functional impairments include difficulty and pain with prolonged standing or walking, bed mobility, bending, kneeling/squatting, house and yard work and performing yoga.  Pt demo's signs and sx consistent with R knee jt capsular hypomobility with mild B hip weakness and mild R LE altered neurodynamics.     Pt. is appropriate for skilled PT intervention as outlined in the Plan of Care (POC).  Pt. is a good candidate for skilled PT services to  improve pain levels and function.    Goals:  Pt. will demonstrate/verbalize independence in self-management of condition in : 12 weeks  Pt. will be independent with home exercise program in : 12 weeks;Comment  Comment:: strength training and yoga with minimal pain or difficulty for improved QOL    Patient's expectations/goals are realistic.    Barriers to Learning or Achieving Goals:  No Barriers.       Plan / Patient Instructions:     Plan of Care:   Communication with: Referral Source  Patient Related Instruction: Nature of Condition;Treatment plan and rationale;Self Care instruction;Basis of treatment;Body mechanics;Posture;Precautions;Next steps;Expected outcome  Times per Week: 1x/every 3-4 weeks  Number of Weeks: 12  Number of Visits: 4  Discharge Planning: when indicated  Precautions / Restrictions : none  Therapeutic Exercise: ROM;Stretching;Strengthening  Neuromuscular Reeducation: posture;TNE;core;other  Neuromuscular Re-education: neurodynamics  Manual Therapy: soft tissue mobilization;joint mobilization;muscle energy  Functional Training (ADL's): self care;ADL's    Plan for next visit: Assess response to knee stretch, hip strengthening and R LE nerve glide.    Treatment techniques, plan of care, and goals were discussed with the patient.  The patient agrees to the plan as outlined.  The plan of care is dynamic and will be modified on an ongoing basis.       Subjective:       Social information:   Occupation: property management   Work Status:Working full time    History of Present Illness:  In November pt was doing yoga and heard her knee have some sort of tear or rip. Pt reports she did some PT sessions and got a home program and took a little break over the past couple of months because it felt like it was getting more aggravating.   Pt returns to PT now looking for a new program to perform to help her knee feel more flexible and body be pain free.  Pt reports h/o back pain that had gotten better with  "doing yoga in the past but she hasn't been doing yoga much since November when she hurt her knee.  Pt reports she used to get more back pain before but now better with sleep number and using pillows to prop.  Pain Ratin  Pain rating at best: 0  Pain rating at worst: 6  Pain description: aching, pain and soreness    Patient reports benefit from:  heat, cold, yoga       Objective:      Patient Outcome Measures :    Lower Extremity Functional Scale (_/80): 49     Scores range from 0-80, where a score of 80 represents maximum function. The minimal clinically important difference is a positive change of 9 points.    Precautions/Restrictions: None  Involved side: Right and Bilateral  Posture Observation:      General sitting posture is  normal.    Gait: Amb with B trendelenberg R > L    ROM: Trunk flex WNL with tightness only, ext min limited with tightness, B SB mod limited with fingertips to superior knee jt with contra hip flexor tightness and L back tightness    L knee ROM about 5 degrees hyperext with 145 degrees flex  R knee ROM about 2-3 degrees hyperext with 130 degrees flex AROM 142 AAROM - uncomfortable    WNL B hip ROM except min tight with R IR    Strength: B LE grossly 5/5 with some awareness of stress R patellar tendon with quad testing  R hip abd and ext 4/5, L hip 4+/5 - with mild pull into back    Sensation: Intact light touch R knee/hip    Special tests: Mild positive R SLR for neural tension with hip flex, add and IR    Treatment Today      TREATMENT MINUTES COMMENTS   Evaluation 30 R Knee, back   Self-care/ Home management     Manual therapy     Neuromuscular Re-education 10 Discussed neural anatomy mechanics and added nerve glide to home program per pt instructions and printed for home.   Therapeutic Activity     Therapeutic Exercises 20 Discussed properties of \"tightness\" and how strengthening is important. Performed and initiated HEP per pt instructions and printed for home. Discussed importance " of goal writing and scheduling exercise to help with motivation to perform.   Gait training     Modality__________________                Total 60    Blank areas are intentional and mean the treatment did not include these items.        PT Evaluation Code: (Please list factors)  Patient History/Comorbidities: h/o knee pain x8 months  Examination: R knee, back  Clinical Presentation: stable  Clinical Decision Making: low    Patient History/  Comorbidities Examination  (body structures and functions, activity limitations, and/or participation restrictions) Clinical Presentation Clinical Decision Making (Complexity)   No documented Comorbidities or personal factors 1-2 Elements Stable and/or uncomplicated Low   1-2 documented comorbidities or personal factor 3 Elements Evolving clinical presentation with changing characteristics Moderate   3-4 documented comorbidities or personal factors 4 or more Unstable and unpredictable High       Maggi Buck PT, DPT, OCS, CLT  6/10/2019  10:10 AM

## 2021-05-31 VITALS — WEIGHT: 147.3 LBS | HEIGHT: 67 IN | BODY MASS INDEX: 23.12 KG/M2
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
## 2021-05-31 NOTE — TELEPHONE ENCOUNTER
Per [place] database:    Last fill    08/01/2019  #30  06/19/2019  #30  04/30/2019  #30    Pt is also using:  Zolpidem Tartrate 5 Mg Tablet     Kusum Hayden LPN

## 2021-05-31 NOTE — TELEPHONE ENCOUNTER
Question following Office Visit  When did you see your provider: 8/1/19  What is your question: Patient stated I had to discontinue the Buspar due feeling nausea, dizziness, plugged ears and very [place] after about 2 weeks. I am also having a very stressful time with my job right now and this was interfering with this.  I am willing to try this medication again but not right now and I am scheduled for an appointment in September.   Okay to leave a detailed message: Yes

## 2021-05-31 NOTE — TELEPHONE ENCOUNTER
Controlled Substance Refill Request  Medication Name:   Requested Prescriptions     Pending Prescriptions Disp Refills     ALPRAZolam (XANAX) 1 MG tablet 30 tablet 1     Sig: Take 1 tablet PO daily PRN anxiety (max 6 per week)     Date Last Fill: 8/1/2019  [facility]      Submit electronically to pharmacy  Controlled Substance Agreement Date Scanned:   Encounter-Level CSA Scan Date:    There are no encounter-level csa scan date.       Last office visit with prescriber/PCP: 8/1/2019 Lavonne Fernandez MD OR same dept: 8/1/2019 Lavonne Fernandez MD OR same specialty: 8/1/2019 Lavonne Fernandez MD  Last physical: 10/1/2018 Last MTM visit: Visit date not found      Patient states would like filled at least by Saturday due to the Holiday weekend.

## 2021-05-31 NOTE — TELEPHONE ENCOUNTER
"Medication Question or Clarification  Who is calling: Patient  What medication are you calling about? (include dose and sig)    Disp Refills Start End    busPIRone (BUSPAR) 7.5 MG tablet 120 tablet 1 8/1/2019     Sig - Route: Take 2 tablets (15 mg total) by mouth 2 (two) times a day. - Oral    Sent to pharmacy as: busPIRone (BUSPAR) 7.5 MG tablet    E-Prescribing Status: Receipt confirmed by pharmacy (8/1/2019  1:44 PM         Who prescribed the medication?: Lavonne Fernandez MD      What is your question/concern?: Patient started the medication as prescribed, did not feel well on the 2 pills two times a day, felt hung off and just needed to go to bed. Spoke with the pharmacist ,who suggested to start a 1 pill two times a day, to build up to the 2 pills two times a day,  Patient feels a little better on 1 pill however is still \"tingly\".  Asking what to do and how to take if should continue with this medication. Would appreciate a call back today before needs to take evening dose.    Pharmacy: [facility] #[id number]  Okay to leave a detailed message?: Yes  Site CMT - Please call the pharmacy to obtain any additional needed information.  "

## 2021-06-01 NOTE — TELEPHONE ENCOUNTER
Medication Question or Clarification  Who is calling: Patient  What medication are you calling about? (include dose and sig)    Disp Refills Start End    ALPRAZolam (XANAX) 1 MG tablet 30 tablet 1 9/25/2019     Sig - Route: Take 1 tablet (1 mg total) by mouth 2 (two) times a day as needed for anxiety (max 6 per week). Take 1 tablet PO daily PRN anxiety (max 6 per week) - Oral    Class: Print    Notes to Pharmacy: Not to exceed 5 additional fills before 10/27/2019      Who prescribed the medication?: Lavonne Fernandez MD   What is your question/concern?: Patient stated that she is having problem filling this medication. Patient stated that she would preferred to go back to the old sig so she won't have trouble filling this medication again.  [id number]  Okay to leave a detailed message?: Yes, [phone]  Site CMT - Please call the pharmacy to obtain any additional needed information.
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
## 2021-06-02 ENCOUNTER — RECORDS - HEALTHEAST (OUTPATIENT)
Dept: ADMINISTRATIVE | Facility: CLINIC | Age: 41
End: 2021-06-02

## 2021-06-02 VITALS — WEIGHT: 152.3 LBS | BODY MASS INDEX: 23.9 KG/M2 | HEIGHT: 67 IN

## 2021-06-02 NOTE — TELEPHONE ENCOUNTER
Controlled Substance Refill Request  Medication:   Requested Prescriptions     Pending Prescriptions Disp Refills     zolpidem (AMBIEN) 5 MG tablet [Pharmacy Med Name: ZOLPIDEM TARTRATE 5 MG TABLET] 30 tablet      Sig: TAKE 0.5 TO 1 TABLET BY MOUTH AT BEDTIME AS NEEDED     Not on current medication list.     Stephanie Roberson RN Triage Nurse Advisor 8:26 PM

## 2021-06-03 VITALS — SYSTOLIC BLOOD PRESSURE: 100 MMHG | HEART RATE: 72 BPM | DIASTOLIC BLOOD PRESSURE: 68 MMHG

## 2021-06-04 VITALS
DIASTOLIC BLOOD PRESSURE: 78 MMHG | HEART RATE: 68 BPM | WEIGHT: 151.2 LBS | SYSTOLIC BLOOD PRESSURE: 136 MMHG | HEIGHT: 67 IN | BODY MASS INDEX: 23.73 KG/M2

## 2021-06-04 NOTE — TELEPHONE ENCOUNTER
Patient stated that she wanted to switch medication to [facility]. Writer did update pharmacy in patient's chart.       Controlled Substance Refill Request  Medication Name:   Requested Prescriptions     Pending Prescriptions Disp Refills     ALPRAZolam (XANAX) 1 MG tablet 30 tablet 1     Sig: Take 1 tablet (1 mg total) by mouth daily as needed for anxiety (max 6 per week). Take 1 tablet PO daily PRN anxiety (max 6 per week)     Date Last Fill: 0925/19  Pharmacy: [id number]      Submit electronically to pharmacy  Controlled Substance Agreement Date Scanned:   Encounter-Level CSA Scan Date:    There are no encounter-level csa scan date.       Last office visit with prescriber/PCP: 9/12/2019 Lavonne Fernandez MD OR same dept: 9/12/2019 Lavonne Fernandez MD OR same specialty: 9/12/2019 Lavonne Fernandez MD  Last physical: 10/1/2018 Last MTM visit: Visit date not found

## 2021-06-04 NOTE — TELEPHONE ENCOUNTER
The most recent clinic visit that this patient had with Dr. Fernandez was reviewed prior to refill.  Dr. Fernandez wanted to see the patient back in 3 months which would be the latter half of December early January.  Refill will be given.  Please schedule patient for follow-up visit.

## 2021-06-04 NOTE — TELEPHONE ENCOUNTER
Per [place] database:    Last fill    11/08/2019  #30  10/05/2019  #30  09/03/2019  #30    Pt is also using  Zolpidem Tartrate 5 Mg Tablet     No other meds noted  No fills noted in [place]    Kusum Hayden LPN

## 2021-06-05 VITALS
DIASTOLIC BLOOD PRESSURE: 76 MMHG | SYSTOLIC BLOOD PRESSURE: 136 MMHG | OXYGEN SATURATION: 99 % | WEIGHT: 151 LBS | HEART RATE: 82 BPM | HEIGHT: 67 IN | BODY MASS INDEX: 23.7 KG/M2

## 2021-06-05 VITALS
TEMPERATURE: 98.1 F | SYSTOLIC BLOOD PRESSURE: 136 MMHG | RESPIRATION RATE: 16 BRPM | HEART RATE: 80 BPM | DIASTOLIC BLOOD PRESSURE: 80 MMHG | BODY MASS INDEX: 23.65 KG/M2 | HEIGHT: 67 IN

## 2021-06-05 VITALS
HEART RATE: 100 BPM | DIASTOLIC BLOOD PRESSURE: 80 MMHG | RESPIRATION RATE: 16 BRPM | SYSTOLIC BLOOD PRESSURE: 130 MMHG | TEMPERATURE: 98.9 F

## 2021-06-05 NOTE — TELEPHONE ENCOUNTER
Controlled Substance Refill Request  Medication Name:   Requested Prescriptions     Pending Prescriptions Disp Refills     ALPRAZolam (XANAX) 1 MG tablet [Pharmacy Med Name: ALPRAZolam 1 MG Oral Tablet] 30 tablet 0     Sig: TAKE 1 TABLET BY MOUTH ONCE DAILY AS NEEDED FOR ANXIETY . DO NOT EXCEED 6 PER  WEEK     Date Last Fill: 12/13/19  Requested Pharmacy: [facility]  Submit electronically to pharmacy  Controlled Substance Agreement on file:   Encounter-Level CSA Scan Date:    There are no encounter-level csa scan date.        Last office visit:  9/12/19

## 2021-06-06 NOTE — TELEPHONE ENCOUNTER
1. Medication Request  Medication name: Deodorant  Requested Pharmacy: [facility] #[id number]  Reason for request: Odor armpits  When did you use medication last?:  n/a  Patient offered appointment:  patient declined  Okay to leave a detailed message: yes  [phone]      2. Medication Request  Medication name: Latisse (help eyelashes grow)  Requested Pharmacy: [facility] #[id number]  Reason for request: Help eyelashes grow  When did you use medication last?:  n/a  Patient offered appointment:  patient declined  Okay to leave a detailed message: yes  [phone]    FYI: Patient is questioning what are the side effects for the above medications. Patient stated that she is aware that she can buy Latisse OTC but it is pricey and wondering if it would be cheaper if it was prescribed.
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
## 2021-06-06 NOTE — TELEPHONE ENCOUNTER
Controlled Substance Refill Request  Medication Name:   Requested Prescriptions     Pending Prescriptions Disp Refills     ALPRAZolam (XANAX) 1 MG tablet 30 tablet 0     Date Last Fill: 2/24/20  Requested Pharmacy: [id number]  Submit electronically to pharmacy  Controlled Substance Agreement on file:   Encounter-Level CSA Scan Date:    There are no encounter-level csa scan date.        Last office visit:  2/26/20

## 2021-06-06 NOTE — TELEPHONE ENCOUNTER
I would not feel comfortable prescribing Latisse as I do not have any experience with this and it is a cosmetic issue; dermatology would be my suggestion. I also am not sure what the deodorant request is for; do OTC not work? Does she have excessive sweating? We do not have a prescription deodorant but do have a stronger topical for hyperhydrosis or excessive sweating

## 2021-06-06 NOTE — TELEPHONE ENCOUNTER
Controlled Substance Refill Request  Medication Name:   Requested Prescriptions     Pending Prescriptions Disp Refills     ALPRAZolam (XANAX) 1 MG tablet [Pharmacy Med Name: ALPRAZolam 1 MG Oral Tablet] 30 tablet 0     Sig: TAKE 1 TABLET BY MOUTH ONCE DAILY AS NEEDED FOR ANXIETY .  DO  NOT  EXCEED  6  PER  WEEK.     Date Last Fill: 1/17/2020  Requested [facility]acy: [facility]  Submit electronically to pharmacy  Controlled Substance Agreement on file:   Encounter-Level CSA Scan Date:    There are no encounter-level csa scan date.        Last office visit:  9/12/19    Jayna Lopez RN  Triage Nurse Advisor

## 2021-06-07 NOTE — TELEPHONE ENCOUNTER
Controlled Substance Refill Request  Medication Name:   Requested Prescriptions     Pending Prescriptions Disp Refills     ALPRAZolam (XANAX) 1 MG tablet [Pharmacy Med Name: ALPRAZolam 1 MG Oral Tablet] 30 tablet 0     Sig: TAKE 1 TABLET BY MOUTH ONCE DAILY AS NEEDED FOR ANXIETY .  DO  NOT  EXCEED  6  TABS  PER  WEEK.     Date Last Fill: 3/18/20  Requested [facility]acy: [facility]  Submit electronically to pharmacy  Controlled Substance Agreement on file:   Encounter-Level CSA Scan Date:    There are no encounter-level csa scan date.        Last office visit:  2/26/20

## 2021-06-09 NOTE — TELEPHONE ENCOUNTER
Controlled Substance Refill Request  Medication Name:   Requested Prescriptions     Pending Prescriptions Disp Refills     zolpidem (AMBIEN) 5 MG tablet [Pharmacy Med Name: Zolpidem Tartrate 5 MG Oral Tablet] 15 tablet 0     Sig: TAKE 1/2 TO 1 (ONE-HALF TO ONE) TABLET BY MOUTH AT BEDTIME AS NEEDED     Date Last Fill: 07/20/2020.  Is patient out of medication?: N/A - electronic request  Patient notified refills processed within 3 business days:  Yes  Requested Pharmacy: [facility] [address] [place] [address]  Submit electronically to pharmacy  Controlled Substance Agreement on file:   Encounter-Level CSA Scan Date:    There are no encounter-level csa scan date.        Last office visit:  02/26/2020 with PCP.

## 2021-06-09 NOTE — TELEPHONE ENCOUNTER
Controlled Substance Refill Request  Medication Name:   Requested Prescriptions     Pending Prescriptions Disp Refills     ALPRAZolam (XANAX) 1 MG tablet [Pharmacy Med Name: ALPRAZolam 1 MG Oral Tablet] 30 tablet 0     Sig: TAKE 1 TABLET BY MOUTH ONCE DAILY AS NEEDED FOR ANXIETY DO  NOT  EXCEED  7  TABS  PER  WEEK     zolpidem (AMBIEN) 5 MG tablet [Pharmacy Med Name: Zolpidem Tartrate 5 MG Oral Tablet] 15 tablet 0     Sig: TAKE 1/2 TO 1 (ONE-HALF TO ONE) TABLET BY MOUTH AT BEDTIME AS NEEDED     Date Last Fill: 6/22/20 xanax; 3/16/20 ambien  Requested Pharmacy: [facility]  Submit electronically to pharmacy  Controlled Substance Agreement on file:   Encounter-Level CSA Scan Date:    There are no encounter-level csa scan date.        Last office visit:  2/26/20

## 2021-06-10 ENCOUNTER — COMMUNICATION - HEALTHEAST (OUTPATIENT)
Dept: FAMILY MEDICINE | Facility: CLINIC | Age: 41
End: 2021-06-10

## 2021-06-10 DIAGNOSIS — F41.1 GENERALIZED ANXIETY DISORDER: ICD-10-CM

## 2021-06-10 RX ORDER — ALPRAZOLAM 1 MG
TABLET ORAL
Qty: 30 TABLET | Refills: 0 | Status: SHIPPED | OUTPATIENT
Start: 2021-06-10 | End: 2021-08-05

## 2021-06-10 NOTE — TELEPHONE ENCOUNTER
Controlled Substance Refill Request  Medication Name:   Requested Prescriptions     Pending Prescriptions Disp Refills     ALPRAZolam (XANAX) 1 MG tablet [Pharmacy Med Name: ALPRAZolam 1 MG Oral Tablet] 30 tablet 0     Sig: TAKE 1 TABLET BY MOUTH ONCE DAILY AS NEEDED FOR ANXIETY DO  NOT  EXCEED  7  TABS  PER  WEEK     Date Last Fill: 7/20/20  Requested [facility]acy: [facility]  Submit electronically to pharmacy  Controlled Substance Agreement on file:   Encounter-Level CSA Scan Date:    There are no encounter-level csa scan date.        Last office visit:  2/26/20

## 2021-06-11 NOTE — TELEPHONE ENCOUNTER
Controlled Substance Refill Request  Medication Name:   Requested Prescriptions     Pending Prescriptions Disp Refills     ALPRAZolam (XANAX) 1 MG tablet [Pharmacy Med Name: ALPRAZolam 1 MG Oral Tablet] 30 tablet 0     Sig: TAKE 1 TABLET BY MOUTH ONCE DAILY AS NEEDED FOR ANXIETY DO  NOT  EXCEED  7  TABS  PER  WEEK     Date Last Fill: 8/18/20  Requested [facility]acy: [facility]  Submit electronically to pharmacy  Controlled Substance Agreement on file:   Encounter-Level CSA Scan Date:    There are no encounter-level csa scan date.        Last office visit:  2/26/20
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
## 2021-06-12 NOTE — TELEPHONE ENCOUNTER
Controlled Substance Refill Request  Medication Name:   Requested Prescriptions     Pending Prescriptions Disp Refills     ALPRAZolam (XANAX) 1 MG tablet [Pharmacy Med Name: ALPRAZolam 1 MG Oral Tablet] 30 tablet 0     Sig: TAKE 1 TABLET BY MOUTH ONCE DAILY AS NEEDED FOR ANXIETY **  DO  NOT  EXCEED  7  TABLETS  PER  WEEK**     Date Last Fill: 10/12/20  Requested [facility]acy: [facility]  Submit electronically to pharmacy  Controlled Substance Agreement on file:   Encounter-Level CSA Scan Date:    There are no encounter-level csa scan date.        Last office visit:  2/26/20

## 2021-06-12 NOTE — TELEPHONE ENCOUNTER
Controlled Substance Refill Request  Medication Name:   Requested Prescriptions     Pending Prescriptions Disp Refills     ALPRAZolam (XANAX) 1 MG tablet [Pharmacy Med Name: ALPRAZolam 1 MG Oral Tablet] 30 tablet 0     Sig: TAKE 1 TABLET BY MOUTH ONCE DAILY AS NEEDED FOR ANXIETY. DO NOT EXCEED 7 TABLETS PER WEEK     Date Last Fill: 9/15/20  Requested [facility]: [facility]  Submit electronically to pharmacy  Controlled Substance Agreement on file:   Encounter-Level CSA Scan Date:    There are no encounter-level csa scan date.        Last office visit:  2/26/20

## 2021-06-13 NOTE — TELEPHONE ENCOUNTER
Controlled Substance Refill Request  Medication Name:   Requested Prescriptions     Pending Prescriptions Disp Refills     ALPRAZolam (XANAX) 1 MG tablet [Pharmacy Med Name: ALPRAZolam 1 MG Oral Tablet] 30 tablet 0     Sig: TAKE 1 TABLET BY MOUTH ONCE DAILY AS NEEDED FOR ANXIETY DO  NOT  EXCEED  7  TABLETS  PER  WEEK     Date Last Fill: 11/8/20  [facility]ed [facility]: [facility]  Submit electronically to pharmacy  Controlled Substance Agreement on file:   Encounter-Level CSA Scan Date:    There are no encounter-level csa scan date.        Last office visit:  2/26/20

## 2021-06-13 NOTE — TELEPHONE ENCOUNTER
Controlled Substance Refill Request  Medication Name:   Requested Prescriptions     Pending Prescriptions Disp Refills     zolpidem (AMBIEN) 5 MG tablet [Pharmacy Med Name: Zolpidem Tartrate 5 MG Oral Tablet] 15 tablet 0     Sig: TAKE 1 TABLET BY MOUTH AT BEDTIME AS NEEDED     Date Last Fill:  07/27/2020  Is patient out of medication?:  Yes  Patient notified refills processed within 3 business days:  Yes  Requested Pharmacy: [id number] [place], [place]  Submit electronically to pharmacy  Controlled Substance Agreement on file:   Encounter-Level CSA Scan Date:    There are no encounter-level csa scan date.        Last office visit:  02/26/2020 with PCP

## 2021-06-14 NOTE — TELEPHONE ENCOUNTER
Controlled Substance Refill Request  Medication Name:   Requested Prescriptions     Pending Prescriptions Disp Refills     ALPRAZolam (XANAX) 1 MG tablet [Pharmacy Med Name: ALPRAZolam 1 MG Oral Tablet] 30 tablet 0     Sig: TAKE 1 TABLET BY MOUTH ONCE DAILY AS NEEDED FOR ANXIETY DO  NOT  EXCEED  7  TABLETS  PER  WEEK     Date Last Fill: 12/30/20  Requested [facility]: [facility]  Submit electronically to pharmacy  Controlled Substance Agreement on file:   Encounter-Level CSA Scan Date:    There are no encounter-level csa scan date.        Last office visit:  2/26/20

## 2021-06-14 NOTE — TELEPHONE ENCOUNTER
Controlled Substance Refill Request  Medication Name:   Requested Prescriptions     Pending Prescriptions Disp Refills     ALPRAZolam (XANAX) 1 MG tablet [Pharmacy Med Name: ALPRAZolam 1 MG Oral Tablet] 30 tablet 0     Sig: TAKE 1 TABLET BY MOUTH ONCE DAILY AS NEEDED FOR ANXIETY DO  NOT  EXCEED  7  TABLETS  PER  WEEK     Date Last Fill: 12/3/20  [facility]ed [facility]: [facility]  Submit electronically to pharmacy  Controlled Substance Agreement on file:   Encounter-Level CSA Scan Date:    There are no encounter-level csa scan date.        Last office visit:  2/26/20

## 2021-06-14 NOTE — TELEPHONE ENCOUNTER
Controlled Substance Refill Request  Medication Name:   Requested Prescriptions     Pending Prescriptions Disp Refills     zolpidem (AMBIEN) 5 MG tablet [Pharmacy Med Name: Zolpidem Tartrate 5 MG Oral Tablet] 15 tablet 0     Sig: TAKE 1 TABLET BY MOUTH AT BEDTIME AS NEEDED     Date Last Fill: 11/13/20  [facility]ed [facility]: [facility]  Submit electronically to pharmacy  Controlled Substance Agreement on file:   Encounter-Level CSA Scan Date:    There are no encounter-level csa scan date.        Last office visit:  2/26/20

## 2021-06-14 NOTE — PROGRESS NOTES
Assessment/Plan:      Healthy female exam.   Problem List Items Addressed This Visit     Generalized Anxiety Disorder     The patient continues to have high levels of anxiety for which she takes Xanax sparingly as needed.  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  We have discussed and have tried SSRI medications in the past without good tolerability.  Continue as is and I encouraged her to try to connect with her therapist on a more regular basis.         Insomnia     Patient takes Ambien 5 mg at bedtime sparingly as needed for insomnia.         PMS (premenstrual syndrome)     The patient would like to give a trial to the NuvaRing to see if this would help mitigate PMS symptoms and some of the emotional lability and increased anxiety around that time.  A prescription was given with directions to skip the week off of the NuvaRing and insert a new one every 3 weeks.         Relevant Medications    etonogestreL-ethinyl estradioL (NUVARING) 0.12-0.015 mg/24 hr vaginal ring      Other Visit Diagnoses     Routine general medical examination at a health care facility    -  Primary    Relevant Orders    Lipid Profile    Thyroid Stimulating Hormone (TSH)    Gynecologic Cytology (PAP Smear)               Subjective:      Merle Jennings is a 41 y.o. female who presents for an annual exam.  The patient's one main concern relates to a trial of a NuvaRing for PMS symptoms.  The patient struggles with mental health and her struggles have been heightened due to the pandemic with a significant history of generalized anxiety disorder.  She does meditation occasionally at home, has a therapist although has struggled to find the time to meet privately via Zoom with that person, and takes Xanax sparingly as needed.  She does find that her mental health symptoms seem to exacerbate  around the time of her menstrual cycle and was talking to a friend who is a physician and they suggested this might be a good option.  She physically is feeling well and has been continuing to adhere to a healthy diet and participating in regular exercise.    Healthy Habits:   Regular Exercise: Yes  Sunscreen Use: Yes  Healthy Diet: Yes  Dental Visits Regularly: Yes  Seat Belt: Yes  Sexually active: Yes  Colon cancer screening: N/A  Lipid Profile: Yes  Glucose Screen: N/A  Prevention of Osteoporosis: Yes  Last Dexa: N/A      Immunization History   Administered Date(s) Administered     Hep B, Adult 08/19/2002, 09/18/2002, 05/29/2003     Influenza,seasonal,quad inj =/> 6months 11/02/2018     Td, adult adsorbed, PF 10/01/2018     Tdap 04/23/2009     Immunization status: up to date and documented.    Gynecologic History  Patient's last menstrual period was 02/01/2021 (exact date).  Contraception: none  Last Pap: 2015. Results were: normal  Last mammogram: N/A. Will start at 44      OB History   No obstetric history on file.       Current Outpatient Medications   Medication Sig Dispense Refill     ALPRAZolam (XANAX) 1 MG tablet TAKE 1 TABLET BY MOUTH ONCE DAILY AS NEEDED FOR ANXIETY DO  NOT  EXCEED  7  TABLETS  PER  WEEK 30 tablet 0     multivitamin therapeutic tablet Take 1 tablet by mouth daily.       zolpidem (AMBIEN) 5 MG tablet TAKE 1 TABLET BY MOUTH AT BEDTIME AS NEEDED 15 tablet 0     etonogestreL-ethinyl estradioL (NUVARING) 0.12-0.015 mg/24 hr vaginal ring Insert 1 each into the vagina every 21 days. Insert one (1) ring vaginally and leave in place for three (3) weeks, then remove and insert new one (skipping menstrual cycle) 4 each 3     No current facility-administered medications for this visit.      No past medical history on file.  Past Surgical History:   Procedure Laterality Date     OR APPENDECTOMY      Description: Appendectomy;  Recorded: 03/12/2010;     Patient has no known allergies.  No family  "history on file.  Social History     Socioeconomic History     Marital status:      Spouse name: Not on file     Number of children: Not on file     Years of education: Not on file     Highest education level: Not on file   Occupational History     Not on file   Social Needs     Financial resource strain: Not on file     Food insecurity     Worry: Not on file     Inability: Not on file     Transportation needs     Medical: Not on file     Non-medical: Not on file   Tobacco Use     Smoking status: Never Smoker     Smokeless tobacco: Never Used   Substance and Sexual Activity     Alcohol use: Not on file     Drug use: Not on file     Sexual activity: Not on file   Lifestyle     Physical activity     Days per week: Not on file     Minutes per session: Not on file     Stress: Not on file   Relationships     Social connections     Talks on phone: Not on file     Gets together: Not on file     Attends [employer] service: Not on file     Active member of club or organization: Not on file     Attends meetings of clubs or organizations: Not on file     Relationship status: Not on file     Intimate partner violence     Fear of current or ex partner: Not on file     Emotionally abused: Not on file     Physically abused: Not on file     Forced sexual activity: Not on file   Other Topics Concern     Not on file   Social History Narrative     Not on file       Review of Systems  General:  Denies problem  Eyes: Denies problem  Ears/Nose/Throat: Denies problem  Cardiovascular: Denies problem  Respiratory:  Denies problem  Gastrointestinal:  Denies problem, Genitourinary: Denies problem  Musculoskeletal:  Denies problem  Skin: Denies problem  Neurologic: Denies problem  Psychiatric: Denies problem  Endocrine: Denies problem  Heme/Lymphatic: Denies problem   Allergic/Immunologic: Denies problem        Objective:         Vitals:    02/01/21 0908   BP: 136/76   Pulse: 82   SpO2: 99%   Weight: 151 lb (68.5 kg)   Height: 5' 7\" " (1.702 m)     Body mass index is 23.65 kg/m .    Physical Exam:  General Appearance: Alert, cooperative, no distress, appears stated age  Head: Normocephalic, without obvious abnormality, atraumatic  Eyes: PERRL, conjunctiva/corneas clear, EOM's intact  Ears: Normal TM's and external ear canals, both ears  Nose: Nares normal, septum midline,mucosa normal, no drainage  Throat: Lips, mucosa, and tongue normal; teeth and gums normal  Neck: Supple, symmetrical, trachea midline, no adenopathy;  thyroid: not enlarged, symmetric, no tenderness/mass/nodules; no carotid bruit or JVD  Back: Symmetric, no curvature, ROM normal, no CVA tenderness  Lungs: Clear to auscultation bilaterally, respirations unlabored  Breasts: No breast masses, tenderness, asymmetry, or nipple discharge.  Heart: Regular rate and rhythm, S1 and S2 normal, no murmur, rub, or gallop, Abdomen: Soft, non-tender, bowel sounds active all four quadrants,  no masses, no organomegaly  Pelvic:Normally developed genitalia with no external lesions or eruptions. Vagina and cervix show no lesions, inflammation, discharge or tenderness. No cystocele, No rectocele.   Extremities: Extremities normal, atraumatic, no cyanosis or edema  Skin: Skin color, texture, turgor normal, no rashes or lesions  Lymph nodes: Cervical, supraclavicular, and axillary nodes normal  Neurologic: Normal

## 2021-06-15 NOTE — TELEPHONE ENCOUNTER
After doing some research, it looks like she could safely keep the ring in for up to 4 weeks and then simply replace with a new one without having the 1 week of her period.

## 2021-06-15 NOTE — TELEPHONE ENCOUNTER
Reason for Call:  Patient calling to ask if she can extend the nuva ring past the 21 day recommendation.    Detailed comments: she is at the 21 day nain 2/25/21    Phone Number Patient can be reached at: Home number on file [phone] (home)    Best Time: any    Can we leave a detailed message on this number?: Yes    Call taken on 2/24/2021 at 4:20 PM by Laurel Andrade

## 2021-06-15 NOTE — TELEPHONE ENCOUNTER
Controlled Substance Refill Request  Medication Name:   Requested Prescriptions     Pending Prescriptions Disp Refills     ALPRAZolam (XANAX) 1 MG tablet [Pharmacy Med Name: ALPRAZolam 1 MG Oral Tablet] 30 tablet 0     Sig: TAKE 1 TABLET BY MOUTH ONCE DAILY AS NEEDED FOR ANXIETY. DO NOT EXCEED 7 TABLETS PER WEEK     Date Last Fill: 2/1/21  Requested Pharmacy: [facility]  Submit electronically to pharmacy  Controlled Substance Agreement on file:   Encounter-Level CSA Scan Date:    There are no encounter-level csa scan date.        Last office visit:  2/1/21  Shauna Ballesteros RN, MA  [facility]    Triage Nurse Advisor

## 2021-06-16 PROBLEM — G47.00 INSOMNIA: Status: ACTIVE | Noted: 2018-10-04

## 2021-06-16 PROBLEM — N94.3 PMS (PREMENSTRUAL SYNDROME): Status: ACTIVE | Noted: 2021-02-01

## 2021-06-16 NOTE — PROGRESS NOTES
Problem List Items Addressed This Visit     PMS (premenstrual syndrome)      Other Visit Diagnoses     Irregular menstrual bleeding    -  Primary      We discussed that the irregular bleeding is likely due to staking the Nuva Ring and skipping her period. This was a very reasonable plan but unfortunately doesn't work for everyone. She was instructed to remove the ring today and then replace it on 3/26/2021. She will then leave the ring in for 3 weeks and then remove for menses the fourth week for the next 3 months. After that she will try staking the rings again, going 2 months between menses. This may be more successful after 3 months on the ring because uterine lining may thin down. If successful she can try to extend 3 months between menses. All questions were answered and patient is comfortable with the plan.    Patient Instructions   1. Remove your Nuva Ring today.  2. Put in a new ring on 3/26/2021.  3. For the next 3 months, remove your ring after 21 days. Leave the ring out for a week, then place a new ring. You will get your menses during this time.  4. If this is successful, you could try stacking the rings again to avoid having a menses every month.           Subjective: Merle Jennings is a 41 y.o. female who is being evaluated via a billable telephone visit. The patient has been having trouble with menstrual bleeding. She started on the Nuva Ring on 2/5/2021. She is hoping to use rings back to back to avoid her menses and accompanying PMS symptoms. She did well with the first ring and had no bleeding. She changed it on 2/25/2021. She started to have bleeding on 3/9/2021 and has had bleeding everyday since then despite still having the ring in. Bleeding has been similar in quantity to a normal menses, just longer. She is changing a pad 3-4 times per day. She does not need the ring for contraception. Prior to the ring she was having a menses every 26-28 days. No other unusual bleeding or  bruising.    Objective: Patient awake and alert. Answering questions appropriately. Mood is good.    What phone number would you like to be contacted at? [phone]  How would you like to obtain your AVS? AVS Preference: MyChart.      Phone call duration: 16 minutes

## 2021-06-16 NOTE — PATIENT INSTRUCTIONS - HE
1. Metrogel vaginal twice daily for 5 days.  2. We will notify you of additional urine results when available.  3. I would recommend discontinuation of Nuva Ring and follow up with Dr. Fernandez about other options.

## 2021-06-16 NOTE — TELEPHONE ENCOUNTER
Reason for Call: patient calling to see if she can get a different medication,   metroNIDAZOLE (METROGEL) 0.75 % vaginal gel is $130.00, would like to see if she can get a pill instead.    Detailed comments: patient would like to get something asap.     Phone Number Patient can be reached at: Home number on file [phone] (home)    Best Time: any    Can we leave a detailed message on this number?: Yes    Call taken on 4/19/2021 at 3:13 PM by Laurel Andrade    
We were notified earlier today of this by pharmacy, new rx was sent earlier today.  Pt needs to contact pharmacy  
0 = swallows foods/liquids without difficulty

## 2021-06-16 NOTE — TELEPHONE ENCOUNTER
Controlled Substance Refill Request  Medication Name:   Requested Prescriptions     Pending Prescriptions Disp Refills     ALPRAZolam (XANAX) 1 MG tablet [Pharmacy Med Name: ALPRAZolam 1 MG Oral Tablet] 30 tablet 0     Sig: TAKE 1 TABLET BY MOUTH ONCE DAILY AS NEEDED     zolpidem (AMBIEN) 5 MG tablet [Pharmacy Med Name: Zolpidem Tartrate 5 MG Oral Tablet] 15 tablet 0     Sig: TAKE 1 TABLET BY MOUTH AT BEDTIME AS NEEDED     Date Last Fill: 1/27/21, 3/2/21  Requested Pharmacy: [facility]  Submit electronically to pharmacy  Controlled Substance Agreement on file:   Encounter-Level CSA Scan Date:    There are no encounter-level csa scan date.        Last office visit:  3/19/21

## 2021-06-16 NOTE — PATIENT INSTRUCTIONS - HE
1. Remove your Nuva Ring today.  2. Put in a new ring on 3/26/2021.  3. For the next 3 months, remove your ring after 21 days. Leave the ring out for a week, then place a new ring. You will get your menses during this time.  4. If this is successful, you could try stacking the rings again to avoid having a menses every month.

## 2021-06-16 NOTE — PROGRESS NOTES
"Assessment/Plan:      Problem List Items Addressed This Visit     None      Visit Diagnoses     Bacterial vaginitis    -  Primary    Relevant Medications    metroNIDAZOLE (METROGEL) 0.75 % vaginal gel    Dysuria        Relevant Orders    Urinalysis-UC if Indicated (Completed)    Culture, Urine    Urine,Microscopic (Completed)    Chlamydia trachomatis & Neisseria gonorrhoeae, Amplified Detection    Wet Prep, Vaginal (Completed)          Patient Instructions   1. Metrogel vaginal twice daily for 5 days.  2. We will notify you of additional urine results when available.  3. I would recommend discontinuation of Nuva Ring and follow up with Dr. Fernandez about other options.    Subjective:   Merle Jennings is a 41 y.o. female who presents today with persistent urinary urgency. She was diagnosed with a UTI last Friday and finished a course of Cipro. Symptoms have improved significantly but haven't resolved and she is concerned about persistent infection. She is also continuing to use her Nuva Ring and she sits in a hot tub frequently.    Patient Active Problem List   Diagnosis     Generalized Anxiety Disorder     Other hyperlipidemia     Migraine Headache     Primary Female Infertility     Dysplastic Nevus     Insomnia     PMS (premenstrual syndrome)      No past medical history on file.  Past Surgical History:   Procedure Laterality Date     [place] APPENDECTOMY      Description: Appendectomy;  Recorded: 03/12/2010;     Review of System: Relevant items noted in HPI. ROS otherwise negative.     Objective:     Vitals:    04/16/21 1430   BP: 136/80   Pulse: 80   Resp: 16   Temp: 98.1  F (36.7  C)   TempSrc: Oral   Height: 5' 7\" (1.702 m)   LMP: 04/16/2021       Physical Exam  Constitutional:       General: She is not in acute distress.     Appearance: She is not ill-appearing.   Genitourinary:     General: Normal vulva.      Vagina: No vaginal discharge.       Results for orders placed or performed in visit on 04/16/21   Wet " Prep, Vaginal    Specimen: Genital   Result Value Ref Range    Yeast Result No yeast seen No yeast seen    Trichomonas No Trichomonas seen No Trichomonas seen    Clue Cells, Wet Prep Clue cells seen (!) No Clue cells seen   Urinalysis-UC if Indicated   Result Value Ref Range    Color, UA Yellow Colorless, Yellow, Straw, Light Yellow    Clarity, UA Clear Clear    Glucose, UA Negative Negative    Protein, UA Negative Negative    Bilirubin, UA Negative Negative    Urobilinogen, UA 0.2 E.U./dL 0.2 E.U./dL, 1.0 E.U./dL    pH, UA 6.0 5.0 - 8.0    Blood, UA Moderate (!) Negative    Ketones, UA Negative Negative    Nitrite, UA Negative Negative    Leukocytes, UA Small (!) Negative    Specific Gravity, UA <=1.005 1.005 - 1.030   Urine,Microscopic   Result Value Ref Range    Bacteria, UA Moderate (!) None Seen    RBC, UA 0-2 None Seen, 0-2 hpf    WBC, UA 0-5 None Seen, 0-5 hpf    Squam Epithel, UA 5-10 (!) None Seen, 0-5 lpf

## 2021-06-16 NOTE — TELEPHONE ENCOUNTER
Controlled Substance Refill Request  Medication Name:   Requested Prescriptions     Pending Prescriptions Disp Refills     ALPRAZolam (XANAX) 1 MG tablet [Pharmacy Med Name: ALPRAZolam 1 MG Oral Tablet] 30 tablet 0     Sig: TAKE 1 TABLET BY MOUTH ONCE DAILY AS NEEDED     Date Last Fill: 3/24/21  Requested Pharmacy: [facility]  Submit electronically to pharmacy  Controlled Substance Agreement on file:   Encounter-Level CSA Scan Date:    There are no encounter-level csa scan date.        Last office visit:  4/9/21

## 2021-06-16 NOTE — PROGRESS NOTES
Assessment/Plan:      Problem List Items Addressed This Visit     None      Visit Diagnoses     Acute cystitis without hematuria    -  Primary    Relevant Medications    ciprofloxacin HCl (CIPRO) 250 MG tablet      Urine micro and Urine culture pending. UTI suggested by symptoms and UA. Empiric treatment with cipro started. We discussed that her Nuva Ring may contribute to UTI risk. She is using it for hormonal reasons, not contraception. She will consider whether or not to continue use of Nuva Ring.     Subjective:   Merle Jennings is a 41 y.o. female who presents today with concerns about possible UTI. She has been having dysuria and urgency for about two weeks. She reports her last UTI was about 20 years ago. No fever that she is aware of. She reports that she has chronic vaginal discharge but hasn't noticed any change. She did start using Nuva ring for birth control recently.    Patient Active Problem List   Diagnosis     Generalized Anxiety Disorder     Other hyperlipidemia     Migraine Headache     Primary Female Infertility     Dysplastic Nevus     Insomnia     PMS (premenstrual syndrome)      No past medical history on file.  Past Surgical History:   Procedure Laterality Date     [place] APPENDECTOMY      Description: Appendectomy;  Recorded: 03/12/2010;       Review of Systems      Objective:     Vitals:    04/09/21 1634   BP: 130/80   Pulse: 100   Resp: 16   Temp: 98.9  F (37.2  C)   TempSrc: Oral       Physical Exam  Constitutional:       General: She is not in acute distress.     Appearance: She is not ill-appearing.   Abdominal:      General: Abdomen is flat. There is no distension.      Palpations: Abdomen is soft.      Tenderness: There is no abdominal tenderness. There is no right CVA tenderness or left CVA tenderness.          Lab Results   Component Value Date    COLORU Yellow 04/09/2021    CLARITYU Slightly Cloudy (!) 04/09/2021    GLUCOSEU Negative 04/09/2021    BILIRUBINUR Negative 04/09/2021     KETONESU Negative 04/09/2021    SPECGRAV <=1.005 04/09/2021    HGBUA Moderate (!) 04/09/2021    PHUR 5.5 04/09/2021    PROTEINUA Negative 04/09/2021    UROBILINOGEN 0.2 E.U./dL 04/09/2021    NITRITE Negative 04/09/2021

## 2021-06-16 NOTE — TELEPHONE ENCOUNTER
Controlled Substance Refill Request  Medication Name:   Requested Prescriptions     Pending Prescriptions Disp Refills     ALPRAZolam (XANAX) 1 MG tablet [Pharmacy Med Name: ALPRAZolam 1 MG Oral Tablet] 30 tablet 0     Sig: TAKE 1 TABLET BY MOUTH ONCE DAILY AS NEEDED     Date Last Fill: 3/24/21  Requested Pharmacy: [facility]  Submit electronically to pharmacy  Controlled Substance Agreement on file:   Encounter-Level CSA Scan Date:    There are no encounter-level csa scan date.        Last office visit:  4/16/21

## 2021-06-17 NOTE — PATIENT INSTRUCTIONS - HE
Patient Instructions by Maggi Buck PT at 6/10/2019 10:00 AM     Author: Maggi Buck PT Service: -- Author Type: Physical Therapist    Filed: 6/10/2019 11:09 AM Encounter Date: 6/10/2019 Status: Addendum    : Maggi Buck PT (Physical Therapist)    Related Notes: Original Note by Maggi Buck PT (Physical Therapist) filed at 6/10/2019 11:07 AM       Try writing a goal for how/when want to do yoga and put it somewhere you see everyday.    Try to schedule within your day.      CLAM SHELLS    While lying on your side with your knees bent, draw up the top knee while keeping contact of your feet together.    Do not let your pelvis roll back during the lifting movement.    x10-20 reps 1-2 sets  3-5x/week      PRONE HIP EXTENSION    While lying face down with your knee straight, slowly raise up leg off the ground.  x10-20 reps each side 1-2 sets  3-5x/week               HIP FLEXOR STAIR STRETCH    TOWEL IN crook of knee.  Stand with one foot on the stair and one foot on the ground below. Keep your feet pointing straight ahead and lean into the leg on the stair keeping your back upright.    Hold 10-20 seconds x3-5 reps           Ulnar Nerve: Butterfly    INSTRUCTIONS:  ? Place your hands on the side of your head  ? Elbows face forward  ? Push your elbows backward towards the wall (or bed if you are doing this laying down)  ? Go as far as you can until you feel a nice, easy stretch (you may get a few tingles which is OK)  ? Return elbows forward again  ? Do not hold  ? Repeat __10-20___ times  ? Repeat _1-2____ times per day     STRAIGHT LEG RAISE - SLR    Stretch leg up with belt up to hamstring tightness and then pull in to L side and then relax out to R side  x10-20 reps   1-2x/day

## 2021-06-17 NOTE — TELEPHONE ENCOUNTER
Reason for Call:  Medication or medication refill:    Do you use a [facility]?  Name of the pharmacy and phone number for the current request: [facility], [place], [place]     Name of the medication requested: ALPRAZolam (XANAX) 1 MG tablet    Other request: Pt is leaving on a plane for a trip from 5/22/21 to 5/30/21.     Pt was wondering if Dr. Fernandez would be able to add a couple of additional tablets for the plane ride to to and from.     Can we leave a detailed message on this number? No call back needed    Phone number patient can be reached at: Home number on file [phone] (home)    Best Time:     Call taken on 5/13/2021 at 10:26 AM by Senia Muñoz

## 2021-06-20 NOTE — LETTER
Letter by Lavonne Fernandez MD at      Author: Lavonne Fernandez MD Service: -- Author Type: --    Filed:  Encounter Date: 12/16/2019 Status: Signed         Merle Jennings   [address] [place] [id number]      12/16/2019      Dear Merle,        Per our refill protocol, you are due for a medication check office visit. Your prescription for Alprazolam was sent to your pharmacy. Please call [phone] to schedule an office visit with Lavonne Fernandez MD  before your next refill is needed to avoid delays.      Thank you  [facility]

## 2021-06-20 NOTE — PROGRESS NOTES
"Assessment/Plan:    Merle was seen today for knee pain.    Diagnoses and all orders for this visit:    Acute pain of right knee: My working diagnosis based on the patient's history as well as her exam is an injury to the quadriceps muscle.  Her knee exam itself is actually quite stable and the lack of pain with passive range of motion in all directions is more consistent with a muscular injury than a cartilaginous or ligamentous injury.  MRI is not indicated based on her exam today.  We discussed the utility of an x-ray today and decided that it also was not indicated based on her exam.  I expect her pain will slowly improve over the next 1-2 weeks.  If her pain worsens, I would consider imaging versus referral to orthopedics.     Return in about 2 weeks (around 10/25/2018) for recheck if not improving.    Christiano Mcclellan MD  _______________________________    Chief Complaint   Patient presents with     Knee Pain     right knee x 5 days, injured during yoga and states she felt and heard \"something snap\"      Subjective: Merle Jennings is a 38 y.o. year old female who I have not seen in clinic before who presents with the following acute complaint(s):    Right knee pain:   - sense of ripping.  Serious of sounds with child's pose in yoga.   - 5 days ago.   - leaves for vacation this weekend.  [place] vacation.    - she has been limping.  \"Babying it.\"  Medial pain.  The knee does not feel unstable.  It makes noise.     - limited walking.  Elevation.   - not hot.  Not swollen.   - chiro.  \"sprain of MCL\"   -Job is office based.    ROS: Complete review of systems obtained.  Pertinent items are listed above.     The following portions of the patient's history were reviewed and updated as appropriate: allergies, current medications, past medical history and problem list.     Objective:   /60 (Patient Site: Left Arm, Patient Position: Sitting, Cuff Size: Adult Regular)  Pulse 72  Temp 98  F (36.7  C) " (Oral)   LMP 09/17/2018 (Approximate)  Breastfeeding? No  General: No acute distress, pleasant  MSK: The patient walks with an antalgic gait.  Her knees are of the same caliber and appearance bilaterally.  No obvious swelling on the right side.  No discoloration of the right side relative to the left side.  The left knee is nontender to palpation on the patella, medial joint line, lateral joint line, distal femur, proximal tibia, fibular head.  No crepitus.  No pain with loading of the MCL or LCL.  ACL and PCL are stable.  The right knee has mild tenderness to palpation over the lateral distal femur as the quadriceps muscle inserts to the knee.  The joint line itself is only mildly tender over the medial collateral ligament.  The lateral collateral ligament is nontender.  With passive range of motion, the patient does not describe pain.  There is no crepitus.  PCL and ACL are stable.    No results found for this or any previous visit (from the past 24 hour(s)).  No results found.    Additional History from Old Records Summarized (2): no  Decision to Obtain Records (1): no  Radiology Tests Summarized or Ordered (1): no  Labs Reviewed or Ordered (1): no  Medicine Test Summarized or Ordered (1): no  Independent Review of EKG or X-RAY(2 each): no    This note has been dictated using voice recognition software. Any grammatical or context distortions are unintentional and inherent to the software

## 2021-06-20 NOTE — PROGRESS NOTES
Assessment/Plan:      Healthy female exam.   1. Generalized anxiety disorder  The patient is requesting to establish with a new psychotherapist to help with her ongoing issues with anxiety.  The patient uses Xanax sparingly as needed and a refill was provided today.  - Ambulatory referral to Psychology  - ALPRAZolam (XANAX) 1 MG tablet; Take 1 tablet (1 mg total) by mouth 2 (two) times a day.  Dispense: 30 tablet; Refill: 0    2. Routine general medical examination at a health care facility  The patient is up-to-date on her Pap smear.  The patient received an updated tetanus vaccine at today's visit.  We discussed the role of vitamin D supplementation for optimal bone health.  We reviewed healthy approach to eating as well as exercise.    3. Insomnia  The patient takes Ambien 1-2 times per week as needed for insomnia.             Subjective:      Merle Jennings is a 38 y.o. female who presents for an annual exam. The patient is here for a medication check visit as well. She takes Ambien 5mg at bedtime 1-2 times per week to help with insomnia. She denies any side effects or problems with this medication. She also takes Xanax 1-2 times per week; she does not want to get dependant on the medication so she has other techniques that she uses to help manage her anxiety.     Healthy Habits:   Regular Exercise: Yes; yoga twice weekly. Has some ankle pain issues.  Sunscreen Use: Yes  Healthy Diet: Yes  Dental Visits Regularly: Yes  Seat Belt: Yes  Sexually active: Yes  Self Breast Exam Monthly:No  Colonoscopy: N/A  Lipid Profile: Yes  Glucose Screen: N/A  Prevention of Osteoporosis: No  Last Dexa: N/A      Immunization History   Administered Date(s) Administered     Hep B, Adult 08/19/2002, 09/18/2002, 05/29/2003     Td, adult adsorbed, PF 10/01/2018     Tdap 04/23/2009     Immunization status: due today.    Gynecologic History  Patient's last menstrual period was 09/17/2018 (approximate).  Contraception:  "infertility  Last Pap: 2015. Results were: normal  Last mammogram: N/A.       OB History   No data available       Current Outpatient Prescriptions   Medication Sig Dispense Refill     ALPRAZolam (XANAX) 1 MG tablet Take 1 tablet (1 mg total) by mouth 2 (two) times a day. 30 tablet 0     multivitamin therapeutic tablet Take 1 tablet by mouth daily.       UNABLE TO FIND Med Name: Lavender oil       zolpidem (AMBIEN) 5 MG tablet TAKE 1/2 TO 1 TABLET BY MOUTH AT BEDTIME AS NEEDED 30 tablet 0     No current facility-administered medications for this visit.      No past medical history on file.  Past Surgical History:   Procedure Laterality Date     IN APPENDECTOMY      Description: Appendectomy;  Recorded: 03/12/2010;     Review of patient's allergies indicates no known allergies.  No family history on file.  Social History     Social History     Marital status:      Spouse name: N/A     Number of children: N/A     Years of education: N/A     Occupational History     Not on file.     Social History Main Topics     Smoking status: Never Smoker     Smokeless tobacco: Never Used     Alcohol use Not on file     Drug use: Not on file     Sexual activity: Not on file     Other Topics Concern     Not on file     Social History Narrative       Review of Systems  General:  Denies problem  Eyes: Denies problem  Ears/Nose/Throat: Denies problem  Cardiovascular: Denies problem  Respiratory:  Denies problem  Gastrointestinal:  Denies problem, Genitourinary: Denies problem  Musculoskeletal:  Denies problem  Skin: Denies problem  Neurologic: Denies problem  Psychiatric: Denies problem  Endocrine: Denies problem  Heme/Lymphatic: Denies problem   Allergic/Immunologic: Denies problem        Objective:         Vitals:    10/01/18 1441   BP: 128/88   Pulse: 86   Resp: 16   SpO2: 99%   Weight: 152 lb 4.8 oz (69.1 kg)   Height: 5' 7\" (1.702 m)     Body mass index is 23.85 kg/(m^2).    Physical Exam:  General Appearance: Alert, " cooperative, no distress, appears stated age  Head: Normocephalic, without obvious abnormality, atraumatic  Eyes: PERRL, conjunctiva/corneas clear, EOM's intact  Ears: Normal TM's and external ear canals, both ears  Nose: Nares normal, septum midline,mucosa normal, no drainage  Throat: Lips, mucosa, and tongue normal; teeth and gums normal  Neck: Supple, symmetrical, trachea midline, no adenopathy;  thyroid: not enlarged, symmetric, no tenderness/mass/nodules; no carotid bruit or JVD  Back: Symmetric, no curvature, ROM normal, no CVA tenderness  Lungs: Clear to auscultation bilaterally, respirations unlabored  Breasts: No breast masses, tenderness, asymmetry, or nipple discharge.  Heart: Regular rate and rhythm, S1 and S2 normal, no murmur, rub, or gallop, Abdomen: Soft, non-tender, bowel sounds active all four quadrants,  no masses, no organomegaly  Pelvic:Not examined  Extremities: Extremities normal, atraumatic, no cyanosis or edema  Skin: Skin color, texture, turgor normal, no rashes or lesions  Lymph nodes: Cervical, supraclavicular, and axillary nodes normal  Neurologic: Normal

## 2021-06-22 NOTE — TELEPHONE ENCOUNTER
Controlled Substance Refill Request  Medication:   Requested Prescriptions     Pending Prescriptions Disp Refills     ALPRAZolam (XANAX) 1 MG tablet [Pharmacy Med Name: ALPRAZOLAM 1 MG TABLET] 30 tablet 0     Sig: TAKE 1 TABLET BY MOUTH TWICE A DAY     Date Last Fill: 11.23.18  [facility]   Submit electronically to pharmacy  Controlled Substance Agreement on File:   Encounter-Level CSA Scan Date:    There are no encounter-level csa scan date.       Last office visit: Last office visit pertaining to requested medication was 10.1.18.

## 2021-06-24 NOTE — TELEPHONE ENCOUNTER
Controlled Substance Refill Request  Medication:   Requested Prescriptions     Pending Prescriptions Disp Refills     ALPRAZolam (XANAX) 1 MG tablet [Pharmacy Med Name: ALPRAZOLAM 1 MG TABLET] 30 tablet 0     Sig: TAKE 1 TABLET BY MOUTH TWICE A DAY     Date Last Fill: 1/7/2019 # 30  [facility] [place] [place]    Submit electronically to pharmacy  Controlled Substance Agreement on File:   Encounter-Level CSA Scan Date:    There are no encounter-level csa scan date.       Last office visit:10/11/2018.  Last office visit pertaining to requested medication was  10/1/2018 with PCP Dr CHANA Fernandez.

## 2021-06-25 NOTE — TELEPHONE ENCOUNTER
Controlled Substance Refill Request  Medication Name:   Requested Prescriptions     Pending Prescriptions Disp Refills     ALPRAZolam (XANAX) 1 MG tablet [Pharmacy Med Name: ALPRAZolam 1 MG Oral Tablet] 30 tablet 0     Sig: TAKE 1 TABLET BY MOUTH ONCE DAILY AS NEEDED     Date Last Fill: 5/13/21  Requested Pharmacy: [facility]  Submit electronically to pharmacy  Controlled Substance Agreement on file:   Encounter-Level CSA Scan Date:    There are no encounter-level csa scan date.        Last office visit:  4/16/21

## 2021-07-01 ASSESSMENT — ANXIETY QUESTIONNAIRES: GAD7 TOTAL SCORE: 17

## 2021-07-04 ENCOUNTER — COMMUNICATION - HEALTHEAST (OUTPATIENT)
Dept: FAMILY MEDICINE | Facility: CLINIC | Age: 41
End: 2021-07-04

## 2021-07-04 DIAGNOSIS — F51.01 PRIMARY INSOMNIA: ICD-10-CM

## 2021-07-04 DIAGNOSIS — F41.1 GENERALIZED ANXIETY DISORDER: ICD-10-CM

## 2021-07-04 NOTE — TELEPHONE ENCOUNTER
Telephone Encounter by Shauna Ballesteros RN at 7/4/2021  4:20 PM     Author: Shauna Ballesteros RN Service: -- Author Type: Registered Nurse    Filed: 7/4/2021  4:22 PM Encounter Date: 7/4/2021 Status: Signed    : Shauna Ballesteros RN (Registered Nurse)       Controlled Substance Refill Request  Medication Name:   Requested Prescriptions     Pending Prescriptions Disp Refills   ? ALPRAZolam (XANAX) 1 MG tablet [Pharmacy Med Name: ALPRAZolam 1 MG Oral Tablet] 30 tablet 0     Sig: TAKE 1 TABLET BY MOUTH ONCE DAILY AS NEEDED   ? zolpidem (AMBIEN) 5 MG tablet [Pharmacy Med Name: Zolpidem Tartrate 5 MG Oral Tablet] 15 tablet 0     Sig: TAKE 1 TABLET BY MOUTH AT BEDTIME AS NEEDED     Date Last Fill: 5/13/21  Requested Pharmacy: [facility]  Submit electronically to pharmacy  Controlled Substance Agreement on file:   Encounter-Level CSA Scan Date:    There are no encounter-level csa scan date.        Last office visit:  4/16/21  Shauna Ballesteros RN, MA  [facility]    Triage Nurse Advisor

## 2021-07-05 RX ORDER — ALPRAZOLAM 1 MG
TABLET ORAL
Qty: 30 TABLET | Refills: 0 | Status: SHIPPED | OUTPATIENT
Start: 2021-07-05 | End: 2021-08-05

## 2021-07-05 RX ORDER — ZOLPIDEM TARTRATE 5 MG/1
TABLET ORAL
Qty: 15 TABLET | Refills: 0 | Status: SHIPPED | OUTPATIENT
Start: 2021-07-05 | End: 2021-08-05

## 2021-07-19 ENCOUNTER — TELEPHONE (OUTPATIENT)
Dept: FAMILY MEDICINE | Facility: CLINIC | Age: 41
End: 2021-07-19

## 2021-07-19 NOTE — TELEPHONE ENCOUNTER
Pt was at cabin with friend for 1 weeks lost her meds  Alprazolam has been lost. Wondering what to do can you possible refill?    Other issue pt states had her period on June 23rd to June 29th normal flow.  Now has again started 7/11 so it has been 11 days it is not letting up.  She is wondering how to proceed.  How abnormal is this for her age?

## 2021-07-20 NOTE — TELEPHONE ENCOUNTER
First, unfortunately I am unable to provide an early fill for her medication. We could try Hydroxyzine taken PRN if she wishes.     In regards to her mentrual cycle, as long as her bleeding is not too heavy, she can monitor for now. However, if she is having heavy bleeding or cramping or ongoing bleeding longer than 3 weeks I would advise that she be seen

## 2021-07-29 DIAGNOSIS — F41.1 GENERALIZED ANXIETY DISORDER: Primary | ICD-10-CM

## 2021-08-02 ENCOUNTER — NURSE TRIAGE (OUTPATIENT)
Dept: NURSING | Facility: CLINIC | Age: 41
End: 2021-08-02

## 2021-08-02 RX ORDER — ALPRAZOLAM 1 MG
TABLET ORAL
Qty: 30 TABLET | Refills: 0 | Status: SHIPPED | OUTPATIENT
Start: 2021-08-02 | End: 2021-08-29

## 2021-08-02 NOTE — TELEPHONE ENCOUNTER
Wants to make appointment as she feels like she is sofia dream.  Tired.    3 days.    Bleeding periods super light x 3 weeks.    Anxiety worse this week.  Hard to leave house.    Has [facility] set up.    Senia JUSTIN [facility] Nurse Advisor    Reason for Disposition    Periods last > 7 days    Additional Information    Negative: SEVERE vaginal bleeding (e.g., continuous red blood from vagina, or large blood clots) and very weak (can't stand)    Negative: Passed out (i.e., fainted, collapsed and was not responding)    Negative: Difficult to awaken or acting confused (e.g., disoriented, slurred speech)    Negative: Shock suspected (e.g., cold/pale/clammy skin, too weak to stand, low BP, rapid pulse)    Negative: Sounds like a life-threatening emergency to the triager    Negative: Pregnant > 20 weeks (5 months or more)    Negative: Pregnant < 20 weeks (less than 5 months)    Negative: Postpartum (from 0 to 6 weeks after delivery)    Negative: Vaginal discharge is the main symptom and bleeding is slight    Negative: SEVERE abdominal pain (e.g., excruciating)    Negative: SEVERE dizziness (e.g., unable to stand, requires support to walk, feels like passing out now)    Negative: SEVERE vaginal bleeding (e.g., soaking 2 pads or tampons per hour and present 2 or more hours; 1 menstrual cup every 2 hours)    Negative: MODERATE vaginal bleeding (e.g., soaking pad or tampon per hour and present > 6 hours; 1 menstrual cup every 6 hours)    Negative: Pale skin (pallor) of new onset or worsening    Negative: Constant abdominal pain lasting > 2 hours    Negative: Patient sounds very sick or weak to the triager    Negative: Taking Coumadin (warfarin) or other strong blood thinner, or known bleeding disorder (e.g., thrombocytopenia)    Negative: Skin bruises or nosebleed and not caused by an injury    Negative: Bleeding/spotting after procedure (e.g., biopsy) or pelvic examination (e.g., pap smear) that  persists > 3 days    Negative: Patient wants to be seen    Negative: Passed tissue (e.g., gray-white)    Negative: Periods with > 6 soaked pads or tampons per day    Protocols used: VAGINAL BLEEDING - [id number]

## 2021-08-02 NOTE — TELEPHONE ENCOUNTER
Routing refill request to provider for review/approval because:  Controlled substance    Last Written Prescription Date:  7/5/21  Last Fill Quantity: 30,  # refills: 0   Last office visit provider:  4/16/21     Requested Prescriptions   Pending Prescriptions Disp Refills     ALPRAZolam (XANAX) 1 MG tablet [Pharmacy Med Name: ALPRAZolam 1 MG Oral Tablet] 30 tablet 0     Sig: TAKE 1 TABLET BY MOUTH ONCE DAILY AS NEEDED       There is no refill protocol information for this order          negro armijo RN 08/02/21 4:20 PM

## 2021-08-05 ENCOUNTER — OFFICE VISIT (OUTPATIENT)
Dept: FAMILY MEDICINE | Facility: CLINIC | Age: 41
End: 2021-08-05
Payer: COMMERCIAL

## 2021-08-05 VITALS
BODY MASS INDEX: 24.43 KG/M2 | SYSTOLIC BLOOD PRESSURE: 134 MMHG | HEART RATE: 76 BPM | DIASTOLIC BLOOD PRESSURE: 74 MMHG | WEIGHT: 156 LBS | OXYGEN SATURATION: 99 %

## 2021-08-05 DIAGNOSIS — F41.1 GENERALIZED ANXIETY DISORDER: ICD-10-CM

## 2021-08-05 DIAGNOSIS — N92.6 IRREGULAR MENSTRUAL CYCLE: Primary | ICD-10-CM

## 2021-08-05 PROCEDURE — 99213 OFFICE O/P EST LOW 20 MIN: CPT | Performed by: FAMILY MEDICINE

## 2021-08-05 ASSESSMENT — ANXIETY QUESTIONNAIRES
5. BEING SO RESTLESS THAT IT IS HARD TO SIT STILL: NEARLY EVERY DAY
7. FEELING AFRAID AS IF SOMETHING AWFUL MIGHT HAPPEN: SEVERAL DAYS
4. TROUBLE RELAXING: NEARLY EVERY DAY
2. NOT BEING ABLE TO STOP OR CONTROL WORRYING: NEARLY EVERY DAY
GAD7 TOTAL SCORE: 18
6. BECOMING EASILY ANNOYED OR IRRITABLE: NEARLY EVERY DAY
1. FEELING NERVOUS, ANXIOUS, OR ON EDGE: MORE THAN HALF THE DAYS
3. WORRYING TOO MUCH ABOUT DIFFERENT THINGS: NEARLY EVERY DAY

## 2021-08-07 NOTE — PROGRESS NOTES
Problem List Items Addressed This Visit        Behavioral    Generalized Anxiety Disorder     The patient continues to struggle with relatively high levels of anxiety; she lost some of her prescription for xanax last month while at a cabin, [place]. She was out of xanax for a couple of weeks and did have some withdrawal symptoms. She is now back on it sparingly and doing well.            Other Visit Diagnoses     Irregular menstrual cycle    -  Primary            Return in about 3 months (around 11/5/2021), or if menstrual cycle remains irregular to reevaluate.    SIRIA Bloom is a 41 year old who presents today for a discussion regarding irregular menstrual bleeding.  The patient reports that she stopped using the NuvaRing around March of this year.  She had fairly normal menstrual cycles for the next 2 to 3 months but recently has been noting things are more irregular and in particular she had been bleeding constantly for the past 3 weeks.  She describes it is fairly light menstrual bleeding.  She does not have a lot of cramping or other discomfort.  She wonders if this is something she needs to be concerned about.  She prefers not to go back on contraception.     Objective    /74 (BP Location: Left arm, Patient Position: Left side, Cuff Size: Adult Regular)   Pulse 76   Wt 70.8 kg (156 lb)   SpO2 99%   BMI 24.43 kg/m    Body mass index is 24.43 kg/m .  Physical Exam   GENERAL: healthy, alert and no distress

## 2021-08-07 NOTE — ASSESSMENT & PLAN NOTE
The patient continues to struggle with relatively high levels of anxiety; she lost some of her prescription for xanax last month while at a cabin, later [place] She was out of xanax for a couple of weeks and did have some withdrawal symptoms. She is now back on it sparingly and doing well.

## 2021-08-26 DIAGNOSIS — F51.01 PRIMARY INSOMNIA: ICD-10-CM

## 2021-08-26 DIAGNOSIS — F41.1 GENERALIZED ANXIETY DISORDER: ICD-10-CM

## 2021-08-29 RX ORDER — ZOLPIDEM TARTRATE 5 MG/1
TABLET ORAL
Qty: 15 TABLET | Refills: 0 | Status: SHIPPED | OUTPATIENT
Start: 2021-08-29 | End: 2021-10-20

## 2021-08-29 RX ORDER — ALPRAZOLAM 1 MG
TABLET ORAL
Qty: 30 TABLET | Refills: 0 | Status: SHIPPED | OUTPATIENT
Start: 2021-08-29 | End: 2021-09-23

## 2021-09-13 ENCOUNTER — TELEPHONE (OUTPATIENT)
Dept: FAMILY MEDICINE | Facility: CLINIC | Age: 41
End: 2021-09-13

## 2021-09-13 NOTE — TELEPHONE ENCOUNTER
Reason for Call: patient is calling to discuss that she is still having her period a lot. Current period has been 12 days. Previous period was 30 days. Would like to talk to provider about the irregularity of her periods. Sometimes it is a very heavy flow. Mother and sister both had endometriosis. Sister currently uses  Mirena, seems to work for her similar symptoms.    Detailed comments: please call back to advise.    Phone Number Patient can be reached at: Home number on file [phone] (home)    Best Time: any    Can we leave a detailed message on this number? YES    Call taken on 9/13/2021 at 10:02 AM by Laurel Andrade

## 2021-09-15 ENCOUNTER — VIRTUAL VISIT (OUTPATIENT)
Dept: FAMILY MEDICINE | Facility: CLINIC | Age: 41
End: 2021-09-15
Payer: COMMERCIAL

## 2021-09-15 ENCOUNTER — HOSPITAL ENCOUNTER (OUTPATIENT)
Dept: ULTRASOUND IMAGING | Facility: CLINIC | Age: 41
Discharge: HOME | End: 2021-09-15
Attending: FAMILY MEDICINE | Admitting: FAMILY MEDICINE
Payer: COMMERCIAL

## 2021-09-15 DIAGNOSIS — N93.8 DUB (DYSFUNCTIONAL UTERINE BLEEDING): Primary | ICD-10-CM

## 2021-09-15 DIAGNOSIS — N93.8 DUB (DYSFUNCTIONAL UTERINE BLEEDING): ICD-10-CM

## 2021-09-15 PROCEDURE — 99213 OFFICE O/P EST LOW 20 MIN: CPT | Mod: 95 | Performed by: FAMILY MEDICINE

## 2021-09-15 PROCEDURE — 76830 TRANSVAGINAL US NON-OB: CPT

## 2021-09-15 NOTE — PROGRESS NOTES
Merle is a 41 year old who is being evaluated via a billable video visit.      How would you like to obtain your AVS? MyChart  If the video visit is dropped, the invitation should be resent by: Text to cell phone: [phone]   Will anyone else be joining your video visit? No      Video Start Time: 2:20    Assessment & Plan   Problem List Items Addressed This Visit        Urinary    DUB (dysfunctional uterine bleeding) - Primary    Relevant Orders    US Pelvic Complete with Transvaginal           [facility]   Merle is a 41 year old who presents today to discuss ongoing issues with irregular bleeding.  The patient has had a very irregular bleeding pattern.  She does at times have very heavy bleeding although that only lasts typically a couple of days.  She had a 30-day time when she bled every day and then stopped but has since started bleeding again is on day 14.  She is not having a significant amount of cramping or pelvic pain associated with this.  She feels well otherwise and did have a thyroid blood test in February when she came in initially complaining of some irregularity.        Objective           Vitals:  No vitals were obtained today due to virtual visit.    Physical Exam   GENERAL: Healthy, alert and no distress  EYES: Eyes grossly normal to inspection.  No discharge or erythema, or obvious scleral/conjunctival abnormalities.  RESP: No audible wheeze, cough, or visible cyanosis.  No visible retractions or increased work of breathing.    SKIN: Visible skin clear. No significant rash, abnormal pigmentation or lesions.  NEURO: Cranial nerves grossly intact.  Mentation and speech appropriate for age.  PSYCH: Mentation appears normal, affect normal/bright, judgement and insight intact, normal speech and appearance well-groomed.         Video-Visit Details    Type of service:  Video Visit    Video End Time:2:29 PM    Originating Location (pt.  Location): Home    Distant Location (provider location):  [facility]     Platform used for Video Visit: Porter

## 2021-09-16 ENCOUNTER — TELEPHONE (OUTPATIENT)
Dept: FAMILY MEDICINE | Facility: CLINIC | Age: 41
End: 2021-09-16

## 2021-09-16 NOTE — TELEPHONE ENCOUNTER
Reason for Call: patient is calling to follow up on U/S results from yesterday. Would like provider to look at them and advise what to do next.    Detailed comments: please call back to discuss.    Phone Number Patient can be reached at: Home number on file [phone] (home)    Best Time: any    Can we leave a detailed message on this number? YES    Call taken on 9/16/2021 at 9:19 AM by Laurel Andrade

## 2021-09-20 ENCOUNTER — MYC MEDICAL ADVICE (OUTPATIENT)
Dept: FAMILY MEDICINE | Facility: CLINIC | Age: 41
End: 2021-09-20

## 2021-09-23 ENCOUNTER — OFFICE VISIT (OUTPATIENT)
Dept: FAMILY MEDICINE | Facility: CLINIC | Age: 41
End: 2021-09-23
Payer: COMMERCIAL

## 2021-09-23 VITALS
DIASTOLIC BLOOD PRESSURE: 78 MMHG | SYSTOLIC BLOOD PRESSURE: 138 MMHG | HEART RATE: 79 BPM | WEIGHT: 167 LBS | HEIGHT: 67 IN | BODY MASS INDEX: 26.21 KG/M2 | OXYGEN SATURATION: 99 %

## 2021-09-23 DIAGNOSIS — J06.9 VIRAL UPPER RESPIRATORY TRACT INFECTION: ICD-10-CM

## 2021-09-23 DIAGNOSIS — R63.5 WEIGHT GAIN: Primary | ICD-10-CM

## 2021-09-23 DIAGNOSIS — F41.1 GENERALIZED ANXIETY DISORDER: ICD-10-CM

## 2021-09-23 PROCEDURE — U0003 INFECTIOUS AGENT DETECTION BY NUCLEIC ACID (DNA OR RNA); SEVERE ACUTE RESPIRATORY SYNDROME CORONAVIRUS 2 (SARS-COV-2) (CORONAVIRUS DISEASE [COVID-19]), AMPLIFIED PROBE TECHNIQUE, MAKING USE OF HIGH THROUGHPUT TECHNOLOGIES AS DESCRIBED BY CMS-2020-01-R: HCPCS | Performed by: FAMILY MEDICINE

## 2021-09-23 PROCEDURE — U0005 INFEC AGEN DETEC AMPLI PROBE: HCPCS | Performed by: FAMILY MEDICINE

## 2021-09-23 PROCEDURE — 99213 OFFICE O/P EST LOW 20 MIN: CPT | Performed by: FAMILY MEDICINE

## 2021-09-23 RX ORDER — ALPRAZOLAM 1 MG
TABLET ORAL
Qty: 30 TABLET | Refills: 0 | Status: SHIPPED | OUTPATIENT
Start: 2021-09-23 | End: 2021-10-20

## 2021-09-23 RX ORDER — PHENTERMINE HYDROCHLORIDE 15 MG/1
15 CAPSULE ORAL EVERY MORNING
Qty: 30 CAPSULE | Refills: 0 | Status: SHIPPED | OUTPATIENT
Start: 2021-09-23 | End: 2021-10-20

## 2021-09-23 ASSESSMENT — MIFFLIN-ST. JEOR: SCORE: 1450.14

## 2021-09-23 ASSESSMENT — ANXIETY QUESTIONNAIRES
7. FEELING AFRAID AS IF SOMETHING AWFUL MIGHT HAPPEN: SEVERAL DAYS
1. FEELING NERVOUS, ANXIOUS, OR ON EDGE: MORE THAN HALF THE DAYS
3. WORRYING TOO MUCH ABOUT DIFFERENT THINGS: NEARLY EVERY DAY
6. BECOMING EASILY ANNOYED OR IRRITABLE: NEARLY EVERY DAY
2. NOT BEING ABLE TO STOP OR CONTROL WORRYING: MORE THAN HALF THE DAYS
IF YOU CHECKED OFF ANY PROBLEMS ON THIS QUESTIONNAIRE, HOW DIFFICULT HAVE THESE PROBLEMS MADE IT FOR YOU TO DO YOUR WORK, TAKE CARE OF THINGS AT HOME, OR GET ALONG WITH OTHER PEOPLE: EXTREMELY DIFFICULT
5. BEING SO RESTLESS THAT IT IS HARD TO SIT STILL: NEARLY EVERY DAY
GAD7 TOTAL SCORE: 17
4. TROUBLE RELAXING: NEARLY EVERY DAY

## 2021-09-23 ASSESSMENT — PATIENT HEALTH QUESTIONNAIRE - PHQ9: SUM OF ALL RESPONSES TO PHQ QUESTIONS 1-9: 21

## 2021-09-24 LAB — SARS-COV-2 RNA RESP QL NAA+PROBE: NEGATIVE

## 2021-09-26 ENCOUNTER — NURSE TRIAGE (OUTPATIENT)
Dept: NURSING | Facility: CLINIC | Age: 41
End: 2021-09-26

## 2021-09-26 ENCOUNTER — OFFICE VISIT (OUTPATIENT)
Dept: FAMILY MEDICINE | Facility: CLINIC | Age: 41
End: 2021-09-26
Payer: COMMERCIAL

## 2021-09-26 VITALS
RESPIRATION RATE: 18 BRPM | OXYGEN SATURATION: 97 % | HEART RATE: 99 BPM | TEMPERATURE: 98.3 F | DIASTOLIC BLOOD PRESSURE: 88 MMHG | SYSTOLIC BLOOD PRESSURE: 138 MMHG

## 2021-09-26 DIAGNOSIS — B30.9 ACUTE VIRAL CONJUNCTIVITIS OF RIGHT EYE: ICD-10-CM

## 2021-09-26 DIAGNOSIS — R07.0 THROAT PAIN: Primary | ICD-10-CM

## 2021-09-26 DIAGNOSIS — H65.02 NON-RECURRENT ACUTE SEROUS OTITIS MEDIA OF LEFT EAR: ICD-10-CM

## 2021-09-26 PROBLEM — R63.5 WEIGHT GAIN: Status: ACTIVE | Noted: 2021-09-26

## 2021-09-26 LAB — DEPRECATED S PYO AG THROAT QL EIA: NEGATIVE

## 2021-09-26 PROCEDURE — 87651 STREP A DNA AMP PROBE: CPT | Performed by: PHYSICIAN ASSISTANT

## 2021-09-26 PROCEDURE — 99213 OFFICE O/P EST LOW 20 MIN: CPT | Performed by: PHYSICIAN ASSISTANT

## 2021-09-26 RX ORDER — AMOXICILLIN 500 MG/1
500 CAPSULE ORAL 2 TIMES DAILY
Qty: 20 CAPSULE | Refills: 0 | Status: SHIPPED | OUTPATIENT
Start: 2021-09-26 | End: 2021-10-07

## 2021-09-26 RX ORDER — OFLOXACIN 3 MG/ML
1-2 SOLUTION/ DROPS OPHTHALMIC
Qty: 7 ML | Refills: 0 | Status: SHIPPED | OUTPATIENT
Start: 2021-09-26 | End: 2021-10-03

## 2021-09-26 ASSESSMENT — ENCOUNTER SYMPTOMS
COUGH: 1
SWEATS: 0
MYALGIAS: 1
RHINORRHEA: 1
EYE REDNESS: 1
SORE THROAT: 1
HEADACHES: 1
CHILLS: 0

## 2021-09-26 ASSESSMENT — LIFESTYLE VARIABLES: SMOKING_STATUS: 0

## 2021-09-26 NOTE — ASSESSMENT & PLAN NOTE
I reviewed the patient's weight history that she has tried on margie.  The patient had successfully responded to phentermine and achieved a weight of around 150 pounds and maintained that for quite some time.  She has had rather significant recent weight gain which she attributes to a few factors in particular stress over the past year.  She feels that a short term use of phentermine would work well to help her get back on track with a goal of getting back to healthy BMI.  Discussed the concern I have regarding using phentermine with her generalized anxiety disorder.  The patient is aware but remembers tolerating it well previously and will let me know if she has significant increase in anxiety symptoms.  Follow-up in 1 month.
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
## 2021-09-26 NOTE — TELEPHONE ENCOUNTER
"Patient calling reporting she has \"cold symptoms.\"   Patient was seen 9/23/21 for upper respiratory symptoms. COVID 19 testing negative from 9/23/21.  Symptoms starting 9/20/21 with cough, congestion.  Increased symptoms today with right eye puffiness and pressure.  Rating pain \"7.5\" on 1-10 pain scale.   Afebrile. Eye crusted with discharge.  Ear pain/pressure.      Disposition to see provider with in 4 hours.  Patient prefers to be seen in person.  Stating she will use [facility] this morning.    Carolyn Babb RN  [facility]      COVID 19 Nurse Triage Plan/Patient Instructions    Please be aware that novel coronavirus (COVID-19) may be circulating in the community. If you develop symptoms such as fever, cough, or SOB or if you have concerns about the presence of another infection including coronavirus (COVID-19), please contact your health care provider or visit [email or web address].     Disposition/Instructions    In-Person Visit with provider recommended. Reference Visit Selection Guide.    Thank you for taking steps to prevent the spread of this virus.  o Limit your contact with others.  o Wear a simple mask to cover your cough.  o Wash your hands well and often.    Resources    [facility]: About COVID-19: [email or web address]    CDC: What to Do If You're Sick: www.cdc.gov/coronavirus/2019-ncov/about/steps-when-sick.html    CDC: Ending Home Isolation: www.cdc.gov/coronavirus/2019-ncov/hcp/disposition-in-home-patients.html     CDC: Caring for Someone: www.cdc.gov/coronavirus/2019-ncov/if-you-are-sick/care-for-someone.html     [facility]: Interim Guidance for Hospital Discharge to Home: www.health.[facility].[place].us/diseases/coronavirus/hcp/hospdischarge.pdf    [facility] clinical trials (COVID-19 research studies): clinicalaffairs.[facility]/umn-clinical-trials     Below are the COVID-19 hotlines at the [place] Department of Health ([facility]). Interpreters are " available.   o For health questions: Call 829-791-1488 or 1-905.581.8699 (7 a.m. to 7 p.m.)  o For questions about schools and childcare: Call 699-073-6894 or 1-602.781.2490 (7 a.m. to 7 p.m.)                       Reason for Disposition    [1] Redness or swelling on the cheek, forehead or around the eye AND [2] no fever    Additional Information    Negative: Severe difficulty breathing (e.g., struggling for each breath, speaks in single words)    Negative: Sounds like a life-threatening emergency to the triager    Negative: [1] Sinus infection AND [2] taking an antibiotic AND [3] symptoms continue    Negative: [1] Difficulty breathing AND [2] not from stuffy nose (e.g., not relieved by cleaning out the nose)    Negative: [1] SEVERE headache AND [2] fever    Negative: [1] Redness or swelling on the cheek, forehead or around the eye AND [2] fever    Negative: Fever > 104 F (40 C)    Negative: Patient sounds very sick or weak to the triager    Negative: [1] SEVERE pain AND [2] not improved 2 hours after pain medicine    Protocols used: SINUS PAIN OR CONGESTION-A-AH

## 2021-09-26 NOTE — PROGRESS NOTES
Assessment & Plan     Throat pain    - Streptococcus A Rapid Screen w/Reflex to PCR - Clinic Collect  - amoxicillin (AMOXIL) 500 MG capsule  Dispense: 20 capsule; Refill: 0    Non-recurrent acute serous otitis media of left ear    - amoxicillin (AMOXIL) 500 MG capsule  Dispense: 20 capsule; Refill: 0    Acute viral conjunctivitis of right eye    - amoxicillin (AMOXIL) 500 MG capsule  Dispense: 20 capsule; Refill: 0       Take antibiotic as directed. Increase fluids with water, Pedialyte, Gatorade, or rehydrating beverages. Alternate Tylenol and Ibuprofen as needed for aches, pains or fever. Follow clear liquid to BRAT diet (bananas, rice, applesauce, toast) as needed for any upset stomach. Rest as much as possible. Use OTC cough and cold medication. Run humidifier at night. Follow up in clinic if symptoms persist or worsen. This usually can last 7-10 days.       Return in about 1 week (around 10/3/2021), or if symptoms worsen or fail to improve.        Mely Bloom is a 41 year old female who presents to clinic today with for the following health issues:  Chief Complaint   Patient presents with     RECHECK     seen 9/23/21 for upper respiratory symptoms. COVID 19 testing (negative) Increased symptoms today with right eye puffiness and pressure. left ear burning     Right eye since yesterday   Left ear pain, attempted to flush it out which offered no relief.   She has raspy cough, sore throat. Cough is productive.   COVID test negative, cannot taste or smell, has been vaccinated for COVID  She has increased pressure    Cough  The current episode started more than 1 week ago. The problem occurs constantly. The problem has been gradually worsening. The cough is productive of sputum. There has been no fever. Associated symptoms include ear congestion, ear pain, headaches, rhinorrhea, sore throat, myalgias and eye redness. Pertinent negatives include no chills and no sweats. She has tried decongestants, an  opioid, cough syrup and mist for the symptoms. The treatment provided mild relief. She is not a smoker.           Review of Systems   Constitutional: Negative for chills.   HENT: Positive for ear pain, rhinorrhea and sore throat.    Eyes: Positive for redness.   Respiratory: Positive for cough.    Musculoskeletal: Positive for myalgias.   Neurological: Positive for headaches.           Objective    /88   Pulse 99   Temp 98.3  F (36.8  C) (Oral)   Resp 18   SpO2 97%   Physical Exam  Constitutional:       Appearance: Normal appearance.   HENT:      Head: Normocephalic and atraumatic.      Right Ear: Tympanic membrane is injected and erythematous. Tympanic membrane is not perforated.      Left Ear: There is impacted cerumen.      Nose: Congestion and rhinorrhea present.      Mouth/Throat:      Mouth: Mucous membranes are moist.      Pharynx: Oropharyngeal exudate and posterior oropharyngeal erythema present.   Eyes:      General: Lids are normal. Lids are everted, no foreign bodies appreciated.         Right eye: Discharge present.      Extraocular Movements: Extraocular movements intact.      Conjunctiva/sclera:      Right eye: Exudate and hemorrhage present.      Pupils: Pupils are equal, round, and reactive to light.   Cardiovascular:      Rate and Rhythm: Normal rate and regular rhythm.      Heart sounds: Normal heart sounds.   Pulmonary:      Effort: Pulmonary effort is normal.      Breath sounds: Normal breath sounds.   Musculoskeletal:      Cervical back: Normal range of motion and neck supple.   Lymphadenopathy:      Cervical: Cervical adenopathy present.   Neurological:      Mental Status: She is alert.              Dominic Alfred PA-C

## 2021-09-26 NOTE — PATIENT INSTRUCTIONS
Increase fluids with water, Pedialyte, Gatorade, or rehydrating beverages. Alternate Tylenol and Ibuprofen as needed for aches, pains or fever. Follow clear liquid to BRAT diet (bananas, rice, applesauce, toast) as needed for any upset stomach. Rest as much as possible. Use OTC cough and cold medication. Run humidifier at night. Follow up in clinic if symptoms persist or worsen. This usually can last 7-10 days.

## 2021-09-27 ENCOUNTER — TELEPHONE (OUTPATIENT)
Dept: FAMILY MEDICINE | Facility: CLINIC | Age: 41
End: 2021-09-27

## 2021-09-27 LAB — GROUP A STREP BY PCR: NOT DETECTED

## 2021-09-28 ENCOUNTER — TELEPHONE (OUTPATIENT)
Dept: FAMILY MEDICINE | Facility: CLINIC | Age: 41
End: 2021-09-28
Payer: COMMERCIAL

## 2021-09-28 NOTE — TELEPHONE ENCOUNTER
Prior Authorization Specialty Medication Request    Medication/Dose: Phentermine 15 mg  ICD code (if different than what is on RX):    Previously Tried and Failed:      Important Lab Values:   Rationale:     Insurance Name:   Insurance ID: [id number]  Insurance Phone Number:

## 2021-09-29 NOTE — TELEPHONE ENCOUNTER
PA Initiation    Medication: phentermine (ADIPEX-P) 15 MG capsule- INITIATED  Insurance Company: [employer] - Phone [phone] Fax [phone]  Pharmacy Filling the Rx: [facility] [address] [place] [address] [address]  Filling Pharmacy Phone: [phone]  Filling Pharmacy Fax: [phone]  Start Date: 9/29/2021

## 2021-10-01 NOTE — TELEPHONE ENCOUNTER
PRIOR AUTHORIZATION DENIED    Medication: phentermine (ADIPEX-P) 15 MG capsule- DENIED    Denial Date: 10/1/2021    Denial Rational:COVERED IF BMI IS >27        Appeal Information:            Central Prior Authorization Team  Phone: [phone]

## 2021-10-07 ENCOUNTER — MYC MEDICAL ADVICE (OUTPATIENT)
Dept: FAMILY MEDICINE | Facility: CLINIC | Age: 41
End: 2021-10-07

## 2021-10-07 ENCOUNTER — OFFICE VISIT (OUTPATIENT)
Dept: FAMILY MEDICINE | Facility: CLINIC | Age: 41
End: 2021-10-07
Payer: COMMERCIAL

## 2021-10-07 VITALS
RESPIRATION RATE: 16 BRPM | BODY MASS INDEX: 24.88 KG/M2 | HEART RATE: 88 BPM | SYSTOLIC BLOOD PRESSURE: 130 MMHG | TEMPERATURE: 97.8 F | WEIGHT: 158.5 LBS | HEIGHT: 67 IN | DIASTOLIC BLOOD PRESSURE: 84 MMHG

## 2021-10-07 DIAGNOSIS — H65.02 ACUTE SEROUS OTITIS MEDIA OF LEFT EAR, RECURRENCE NOT SPECIFIED: ICD-10-CM

## 2021-10-07 DIAGNOSIS — Z30.430 ENCOUNTER FOR INSERTION OF INTRAUTERINE CONTRACEPTIVE DEVICE: Primary | ICD-10-CM

## 2021-10-07 LAB — HCG UR QL: NEGATIVE

## 2021-10-07 PROCEDURE — 99213 OFFICE O/P EST LOW 20 MIN: CPT | Mod: 25 | Performed by: FAMILY MEDICINE

## 2021-10-07 PROCEDURE — 58300 INSERT INTRAUTERINE DEVICE: CPT | Performed by: FAMILY MEDICINE

## 2021-10-07 PROCEDURE — 87491 CHLMYD TRACH DNA AMP PROBE: CPT | Performed by: FAMILY MEDICINE

## 2021-10-07 PROCEDURE — 87591 N.GONORRHOEAE DNA AMP PROB: CPT | Performed by: FAMILY MEDICINE

## 2021-10-07 PROCEDURE — 81025 URINE PREGNANCY TEST: CPT | Performed by: FAMILY MEDICINE

## 2021-10-07 ASSESSMENT — ANXIETY QUESTIONNAIRES
7. FEELING AFRAID AS IF SOMETHING AWFUL MIGHT HAPPEN: SEVERAL DAYS
8. IF YOU CHECKED OFF ANY PROBLEMS, HOW DIFFICULT HAVE THESE MADE IT FOR YOU TO DO YOUR WORK, TAKE CARE OF THINGS AT HOME, OR GET ALONG WITH OTHER PEOPLE?: SOMEWHAT DIFFICULT
GAD7 TOTAL SCORE: 18
7. FEELING AFRAID AS IF SOMETHING AWFUL MIGHT HAPPEN: SEVERAL DAYS
GAD7 TOTAL SCORE: 18
1. FEELING NERVOUS, ANXIOUS, OR ON EDGE: NEARLY EVERY DAY
6. BECOMING EASILY ANNOYED OR IRRITABLE: NEARLY EVERY DAY
GAD7 TOTAL SCORE: 18
4. TROUBLE RELAXING: NEARLY EVERY DAY
5. BEING SO RESTLESS THAT IT IS HARD TO SIT STILL: MORE THAN HALF THE DAYS
2. NOT BEING ABLE TO STOP OR CONTROL WORRYING: NEARLY EVERY DAY
3. WORRYING TOO MUCH ABOUT DIFFERENT THINGS: NEARLY EVERY DAY

## 2021-10-07 ASSESSMENT — MIFFLIN-ST. JEOR: SCORE: 1411.58

## 2021-10-07 NOTE — PROGRESS NOTES
"Assessment/Plan:      Problem List Items Addressed This Visit     None      Visit Diagnoses     Acute serous otitis media of left ear, recurrence not specified          No infection present at this time. Flonase 1 spray each nostril daily for 14 days recommended.    Patient Instructions   1. Flonase nasal spray 1 spray each nostril daily for 14 days.    Subjective:   Merle Jennings is a 41 year old person who presents today for IUD placement. She is also asking that I check her left ear. She is having persistent symptoms of fluid in the ear. She is just finishing a 10 day course of antibiotic. She is not using any nasal spray.    Patient Active Problem List   Diagnosis     Generalized anxiety disorder     Other hyperlipidemia     Migraine headache     Primary Female Infertility     Dysplastic Nevus     Insomnia     PMS (premenstrual syndrome)     DUB (dysfunctional uterine bleeding)     Weight gain      History reviewed. No pertinent past medical history.  Past Surgical History:   Procedure Laterality Date     C APPENDECTOMY      Description: Appendectomy;  Recorded: 03/12/2010;       Review of System: Relevant items noted in HPI. ROS otherwise negative.     Objective:     Vitals:    10/07/21 1030   BP: 130/84   BP Location: Left arm   Patient Position: Sitting   Cuff Size: Adult Regular   Pulse: 88   Resp: 16   Temp: 97.8  F (36.6  C)   TempSrc: Oral   Weight: 71.9 kg (158 lb 8 oz)   Height: 1.702 m (5' 7\")        Physical Exam  Constitutional:       General: She is not in acute distress.     Appearance: She is not ill-appearing.   HENT:      Right Ear: Tympanic membrane and ear canal normal.      Left Ear: Ear canal normal. A middle ear effusion (clear fluid) is present. Tympanic membrane is not erythematous, retracted or bulging.   Lymphadenopathy:      Cervical: No cervical adenopathy.        "

## 2021-10-07 NOTE — PROGRESS NOTES
IUD Insertion Procedure Note    Pre-operative Diagnosis: Heavy menses    Post-operative Diagnosis: same    Indications: contraception    History: The patient is having issues with frequent heavy menses. She did have a recent ultrasound that showed a small fibroid and small ovarian cyst, normal endometrium. No other abnormal bleeding or bruising. Her LMP was about 3 weeks ago.     Procedure Details   Urine pregnancy test was done today  and result was negative.  The risks (including infection, bleeding, pain, and uterine perforation) and benefits of the procedure were explained to the patient and written informed consent was obtained.      Bimanual exam:   Ovaries: no masses appreciated  Uterus: anteverted, smooth contour, without enlargement, mobile and without tenderness    Speculum placed.  Cervix appears normal.  Cervix cleansed with Betadine. Tenaculum applied to the cervix at 12 O'clock and gentle traction applied.  Cervical Os finder used and was passed without difficulty. Uterus sounded to 8 cm. IUD inserted following aseptic technique by usual protocol without difficulty. String visible and trimmed to 3 cm. Patient had cramping with procedure. No complications.    IUD Information:  Mirena, Lot # [id number], Expiration date Dec 2023.    Condition:  Stable    Complications:  None    Plan:  The patient was given after care instructions.

## 2021-10-08 LAB
C TRACH DNA SPEC QL NAA+PROBE: NEGATIVE
N GONORRHOEA DNA SPEC QL NAA+PROBE: NEGATIVE

## 2021-10-08 ASSESSMENT — ANXIETY QUESTIONNAIRES: GAD7 TOTAL SCORE: 18

## 2021-10-11 ENCOUNTER — HEALTH MAINTENANCE LETTER (OUTPATIENT)
Age: 41
End: 2021-10-11

## 2021-10-18 DIAGNOSIS — R63.5 WEIGHT GAIN: ICD-10-CM

## 2021-10-18 DIAGNOSIS — F51.01 PRIMARY INSOMNIA: ICD-10-CM

## 2021-10-18 DIAGNOSIS — F41.1 GENERALIZED ANXIETY DISORDER: ICD-10-CM

## 2021-10-20 RX ORDER — ZOLPIDEM TARTRATE 5 MG/1
TABLET ORAL
Qty: 15 TABLET | Refills: 0 | Status: SHIPPED | OUTPATIENT
Start: 2021-10-20 | End: 2021-12-15

## 2021-10-20 RX ORDER — ALPRAZOLAM 1 MG
TABLET ORAL
Qty: 30 TABLET | Refills: 0 | Status: SHIPPED | OUTPATIENT
Start: 2021-10-20 | End: 2021-11-18

## 2021-10-20 RX ORDER — PHENTERMINE HYDROCHLORIDE 15 MG/1
CAPSULE ORAL
Qty: 30 CAPSULE | Refills: 0 | Status: SHIPPED | OUTPATIENT
Start: 2021-10-20 | End: 2021-10-25

## 2021-10-20 NOTE — TELEPHONE ENCOUNTER
Routing refill request to provider for review/approval because:  Drug not on the G refill protocol - controlled substance    Alprazolam Last Written Prescription Date:  9/23/2021  Last Fill Quantity: 30,  # refills: 0   Last office visit provider:  10/7/2021 Dr. Rodrigues     Phentermine Last Written Prescription Date:  9/23/2021  Last Fill Quantity: 30,  # refills: 0   Last office visit provider:  10/7/2021 Dr. Rodrigues     Zolpidem Last Written Prescription Date:  8/29/2021  Last Fill Quantity: 15,  # refills: 0   Last office visit provider:  10/7/2021 Dr. Rodrigues     Requested Prescriptions   Pending Prescriptions Disp Refills     ALPRAZolam (XANAX) 1 MG tablet [Pharmacy Med Name: ALPRAZolam 1 MG Oral Tablet] 30 tablet 0     Sig: TAKE 1 TABLET BY MOUTH ONCE DAILY AS NEEDED       There is no refill protocol information for this order        phentermine (ADIPEX-P) 15 MG capsule [Pharmacy Med Name: Phentermine HCl 15 MG Oral Capsule] 30 capsule 0     Sig: Take 1 capsule by mouth in the morning       There is no refill protocol information for this order        zolpidem (AMBIEN) 5 MG tablet [Pharmacy Med Name: Zolpidem Tartrate 5 MG Oral Tablet] 15 tablet 0     Sig: TAKE 1 TABLET BY MOUTH AT BEDTIME AS NEEDED       There is no refill protocol information for this order          Jayna Lopez RN 10/19/21 7:59 PM

## 2021-10-23 DIAGNOSIS — R63.5 WEIGHT GAIN: ICD-10-CM

## 2021-10-25 RX ORDER — PHENTERMINE HYDROCHLORIDE 15 MG/1
CAPSULE ORAL
Qty: 30 CAPSULE | Refills: 0 | Status: SHIPPED | OUTPATIENT
Start: 2021-10-25 | End: 2022-05-02

## 2021-10-25 NOTE — TELEPHONE ENCOUNTER
Routing refill request to provider for review/approval because:  Controlled substance request  Early refill requested.    Last Written Prescription Date:  10/20/21  Last Fill Quantity: 30,  # refills: 0   Last office visit provider:  10/7/20     Requested Prescriptions   Pending Prescriptions Disp Refills     phentermine (ADIPEX-P) 15 MG capsule [Pharmacy Med Name: Phentermine HCl 15 MG Oral Capsule] 30 capsule 0     Sig: Take 1 capsule by mouth in the morning       There is no refill protocol information for this order          Ramon Landry RN 10/25/21 10:06 AM

## 2021-11-16 DIAGNOSIS — F41.1 GENERALIZED ANXIETY DISORDER: ICD-10-CM

## 2021-11-18 RX ORDER — ALPRAZOLAM 1 MG
TABLET ORAL
Qty: 30 TABLET | Refills: 0 | Status: SHIPPED | OUTPATIENT
Start: 2021-11-18 | End: 2021-12-15

## 2021-11-18 NOTE — TELEPHONE ENCOUNTER
Routing refill request to provider for review/approval because:  Controlled substance request    Last Written Prescription Date:  10/20/21  Last Fill Quantity: 30,  # refills: 0   Last office visit provider:  10/7/21     Requested Prescriptions   Pending Prescriptions Disp Refills     ALPRAZolam (XANAX) 1 MG tablet [Pharmacy Med Name: ALPRAZolam 1 MG Oral Tablet] 30 tablet 0     Sig: TAKE 1 TABLET BY MOUTH ONCE DAILY AS NEEDED       There is no refill protocol information for this order          Rmaon Landry RN 11/18/21 11:00 AM

## 2021-12-12 DIAGNOSIS — F51.01 PRIMARY INSOMNIA: ICD-10-CM

## 2021-12-12 DIAGNOSIS — F41.1 GENERALIZED ANXIETY DISORDER: ICD-10-CM

## 2021-12-14 NOTE — TELEPHONE ENCOUNTER
Routing refill request to provider for review/approval because:  Controlled substance request    Last Written Prescription Date:  10/20/21  Last Fill Quantity: 15,  # refills: 0   Last office visit provider:  10/7/21     Requested Prescriptions   Pending Prescriptions Disp Refills     zolpidem (AMBIEN) 5 MG tablet [Pharmacy Med Name: Zolpidem Tartrate 5 MG Oral Tablet] 15 tablet 0     Sig: TAKE 1 TABLET BY MOUTH AT BEDTIME AS NEEDED       There is no refill protocol information for this order          Ramon Landry RN 12/14/21 3:14 PM

## 2021-12-14 NOTE — TELEPHONE ENCOUNTER
Routing refill request to provider for review/approval because:  Drug not on the G refill protocol  Controlled substance.  No CSA noted in chart.     Last Written Prescription Date:  11/18/21  Last Fill Quantity: 30,  # refills: 0   Last office visit provider:  10/7/21     Requested Prescriptions   Pending Prescriptions Disp Refills     ALPRAZolam (XANAX) 1 MG tablet [Pharmacy Med Name: ALPRAZolam 1 MG Oral Tablet] 30 tablet 0     Sig: TAKE 1 TABLET BY MOUTH ONCE DAILY AS NEEDED       There is no refill protocol information for this order          Tania Willoughby RN 12/14/21 2:57 PM

## 2021-12-15 RX ORDER — ALPRAZOLAM 1 MG
TABLET ORAL
Qty: 30 TABLET | Refills: 0 | Status: SHIPPED | OUTPATIENT
Start: 2021-12-15 | End: 2022-01-06

## 2021-12-15 RX ORDER — ZOLPIDEM TARTRATE 5 MG/1
TABLET ORAL
Qty: 15 TABLET | Refills: 0 | Status: SHIPPED | OUTPATIENT
Start: 2021-12-15 | End: 2022-02-10

## 2022-01-05 ENCOUNTER — MYC MEDICAL ADVICE (OUTPATIENT)
Dept: FAMILY MEDICINE | Facility: CLINIC | Age: 42
End: 2022-01-05
Payer: COMMERCIAL

## 2022-01-05 DIAGNOSIS — F41.1 GENERALIZED ANXIETY DISORDER: ICD-10-CM

## 2022-01-06 RX ORDER — ALPRAZOLAM 1 MG
TABLET ORAL
Qty: 30 TABLET | Refills: 0 | Status: SHIPPED | OUTPATIENT
Start: 2022-01-06 | End: 2022-02-01

## 2022-01-30 DIAGNOSIS — F41.1 GENERALIZED ANXIETY DISORDER: ICD-10-CM

## 2022-02-01 RX ORDER — ALPRAZOLAM 1 MG
TABLET ORAL
Qty: 30 TABLET | Refills: 0 | Status: SHIPPED | OUTPATIENT
Start: 2022-02-01 | End: 2022-03-06

## 2022-02-01 NOTE — TELEPHONE ENCOUNTER
Routing refill request to provider for review/approval because:  Controlled substance request    Last Written Prescription Date:  1/6/22  Last Fill Quantity: 30,  # refills: 0   Last office visit provider:  10/7/21     Requested Prescriptions   Pending Prescriptions Disp Refills     ALPRAZolam (XANAX) 1 MG tablet [Pharmacy Med Name: ALPRAZolam 1 MG Oral Tablet] 30 tablet 0     Sig: TAKE 1 TABLET BY MOUTH AS NEEDED FOR ANXIETY OR  FEAR  OF  FLYING       There is no refill protocol information for this order          Quita Thompson RN 02/01/22 12:00 PM

## 2022-02-08 DIAGNOSIS — F51.01 PRIMARY INSOMNIA: ICD-10-CM

## 2022-02-10 RX ORDER — ZOLPIDEM TARTRATE 5 MG/1
TABLET ORAL
Qty: 15 TABLET | Refills: 0 | Status: SHIPPED | OUTPATIENT
Start: 2022-02-10 | End: 2022-05-26

## 2022-02-10 NOTE — TELEPHONE ENCOUNTER
Routing refill request to provider for review/approval because:  Controlled substance request    Last Written Prescription Date:  12/15/21  Last Fill Quantity: 15,  # refills: 0   Last office visit provider:  10/7/21     Requested Prescriptions   Pending Prescriptions Disp Refills     zolpidem (AMBIEN) 5 MG tablet [Pharmacy Med Name: Zolpidem Tartrate 5 MG Oral Tablet] 15 tablet 0     Sig: TAKE 1 TABLET BY MOUTH AT BEDTIME AS NEEDED       There is no refill protocol information for this order          Ramon Landry RN 02/10/22 9:21 AM

## 2022-02-27 ENCOUNTER — MYC MEDICAL ADVICE (OUTPATIENT)
Dept: FAMILY MEDICINE | Facility: CLINIC | Age: 42
End: 2022-02-27
Payer: COMMERCIAL

## 2022-02-27 DIAGNOSIS — M54.2 NECK PAIN: Primary | ICD-10-CM

## 2022-02-28 RX ORDER — CYCLOBENZAPRINE HCL 10 MG
10 TABLET ORAL
Qty: 30 TABLET | Refills: 1 | Status: SHIPPED | OUTPATIENT
Start: 2022-02-28 | End: 2022-05-26

## 2022-03-03 DIAGNOSIS — F41.1 GENERALIZED ANXIETY DISORDER: ICD-10-CM

## 2022-03-04 NOTE — TELEPHONE ENCOUNTER
Routing refill request to provider for review/approval because:  Controlled substance request.    Last Written Prescription Date:  2/1/22  Last Fill Quantity: 30,  # refills: 0   Last office visit provider:  9/23/21     Requested Prescriptions   Pending Prescriptions Disp Refills     ALPRAZolam (XANAX) 1 MG tablet [Pharmacy Med Name: ALPRAZolam 1 MG Oral Tablet] 30 tablet 0     Sig: TAKE 1 TABLET BY MOUTH AS NEEDED FOR ANXIETY OR  FEAR  OF  FLYING       There is no refill protocol information for this order          Emely Mcginnis RN 03/04/22 3:48 PM

## 2022-03-06 RX ORDER — ALPRAZOLAM 1 MG
TABLET ORAL
Qty: 30 TABLET | Refills: 0 | Status: SHIPPED | OUTPATIENT
Start: 2022-03-06 | End: 2022-04-04

## 2022-03-27 ENCOUNTER — HEALTH MAINTENANCE LETTER (OUTPATIENT)
Age: 42
End: 2022-03-27

## 2022-04-03 ENCOUNTER — NURSE TRIAGE (OUTPATIENT)
Dept: NURSING | Facility: CLINIC | Age: 42
End: 2022-04-03
Payer: COMMERCIAL

## 2022-04-03 ENCOUNTER — MYC MEDICAL ADVICE (OUTPATIENT)
Dept: FAMILY MEDICINE | Facility: CLINIC | Age: 42
End: 2022-04-03
Payer: COMMERCIAL

## 2022-04-03 NOTE — TELEPHONE ENCOUNTER
"Patient is requesting refill on Xanax medication.  Patient advised that provider refused the request on the 28th of March.  Patient offered a virtual visit to discuss with primary, and patient refused.  Patient says she will call on Monday when the clinic open up.    Reason for Disposition    Caller requesting a CONTROLLED substance prescription refill (e.g., narcotics, ADHD medicines)    Additional Information    Negative: Drug overdose and triager unable to answer question    Negative: Caller requesting information unrelated to medicine    Negative: Caller requesting a prescription for Strep throat and has a positive culture result    Negative: Rash while taking a medication or within 3 days of stopping it    Negative: Immunization reaction suspected    Negative: [1] Asthma and [2] having symptoms of asthma (cough, wheezing, etc.)    Negative: [1] Influenza symptoms AND [2] anti-viral med prescription request, such as Tamiflu    Negative: [1] Symptom of illness (e.g., headache, abdominal pain, earache, vomiting) AND [2] more than mild    Negative: MORE THAN A DOUBLE DOSE of a prescription or over-the-counter (OTC) drug    Negative: [1] DOUBLE DOSE (an extra dose or lesser amount) of over-the-counter (OTC) drug AND [2] any symptoms (e.g., dizziness, nausea, pain, sleepiness)    Negative: [1] DOUBLE DOSE (an extra dose or lesser amount) of prescription drug AND [2] any symptoms (e.g., dizziness, nausea, pain, sleepiness)    Negative: Took another person's prescription drug    Negative: [1] DOUBLE DOSE (an extra dose or lesser amount) of prescription drug AND [2] NO symptoms (Exception: a double dose of antibiotics)    Negative: Diabetes drug error or overdose (e.g., took wrong type of insulin or took extra dose)    Negative: [1] Request for URGENT new prescription or refill of \"essential\" medication (i.e., likelihood of harm to patient if not taken) AND [2] triager unable to fill per unit policy    Negative: [1] " Prescription not at pharmacy AND [2] was prescribed by PCP recently    Negative: [1] Pharmacy calling with prescription questions AND [2] triager unable to answer question    Negative: [1] Caller has URGENT medication question about med that PCP or specialist prescribed AND [2] triager unable to answer question    Negative: [1] Caller has NON-URGENT medication question about med that PCP prescribed AND [2] triager unable to answer question    Negative: [1] Caller requesting a NON-URGENT new prescription or refill AND [2] triager unable to refill per unit policy    Negative: [1] Caller has medication question about med not prescribed by PCP AND [2] triager unable to answer question (e.g., compatibility with other med, storage)    Protocols used: MEDICATION QUESTION CALL-A-

## 2022-04-04 DIAGNOSIS — F41.1 GENERALIZED ANXIETY DISORDER: ICD-10-CM

## 2022-04-04 RX ORDER — ALPRAZOLAM 1 MG
TABLET ORAL
Qty: 30 TABLET | Refills: 0 | Status: SHIPPED | OUTPATIENT
Start: 2022-04-04 | End: 2022-04-12

## 2022-04-04 NOTE — TELEPHONE ENCOUNTER
Reason for Call: patient is calling for a refill of    ALPRAZolam (XANAX) 1 MG tablet     SEND TO [facility]    Detailed comments: LAST SEEN 10/7/21. HAS APPT SCHEDULED FOR 5/2022. She is out of medication, would appreciate a covering provider to address.    Phone Number Patient can be reached at: Home number on file [phone] (home)    Best Time: any    Can we leave a detailed message on this number? YES    Call taken on 4/4/2022 at 11:14 AM by Laurel Andrade

## 2022-04-12 ENCOUNTER — TELEPHONE (OUTPATIENT)
Dept: FAMILY MEDICINE | Facility: CLINIC | Age: 42
End: 2022-04-12
Payer: COMMERCIAL

## 2022-04-12 DIAGNOSIS — F41.1 GENERALIZED ANXIETY DISORDER: ICD-10-CM

## 2022-04-12 RX ORDER — ALPRAZOLAM 1 MG
TABLET ORAL
Qty: 6 TABLET | Refills: 0 | Status: SHIPPED | OUTPATIENT
Start: 2022-04-12 | End: 2022-05-02

## 2022-04-12 NOTE — TELEPHONE ENCOUNTER
Pt has been called. She picked up 30 pills on 4/4 states she takes one tab daily.  She is going on vacation on 23rd - 30th usually needs 3 pills per flight she will not have enough meds to get back or to take daily as she currently does.  She does have appt in May for a physical and not sure why she would need to be seen sooner

## 2022-04-12 NOTE — TELEPHONE ENCOUNTER
The patient is actually overdue for a follow-up appointment since she is on chronic benzodiazepines.  I just filled a 30-day supply on April 4 and so I feel like she should have enough until the end of the month.  Please reach out and see if she can simply use a 30-day supply for flying next week and then come in to be seen by the end of the month.

## 2022-04-12 NOTE — TELEPHONE ENCOUNTER
Reason for Call:  Patient is calling to request another small amount of    ALPRAZolam (XANAX) 1 MG tablet     Prior to flying, she will need more than the monthly RX. She is flying next week.    SEND TO [facility].    Detailed comments: patient states pharmacy will not supply more than 30. She takes this daily, would appreciate a small amount be sent in also.      Phone Number Patient can be reached at: Home number on file [phone] (home)    Best Time: any    Can we leave a detailed message on this number? YES    Call taken on 4/12/2022 at 8:48 AM by Laurel Andrade

## 2022-04-30 DIAGNOSIS — F41.1 GENERALIZED ANXIETY DISORDER: ICD-10-CM

## 2022-05-02 ENCOUNTER — OFFICE VISIT (OUTPATIENT)
Dept: FAMILY MEDICINE | Facility: CLINIC | Age: 42
End: 2022-05-02
Payer: COMMERCIAL

## 2022-05-02 VITALS
WEIGHT: 164 LBS | SYSTOLIC BLOOD PRESSURE: 128 MMHG | BODY MASS INDEX: 25.74 KG/M2 | HEART RATE: 94 BPM | OXYGEN SATURATION: 99 % | DIASTOLIC BLOOD PRESSURE: 74 MMHG | HEIGHT: 67 IN

## 2022-05-02 DIAGNOSIS — F41.1 GENERALIZED ANXIETY DISORDER: ICD-10-CM

## 2022-05-02 DIAGNOSIS — M54.2 CERVICALGIA: ICD-10-CM

## 2022-05-02 DIAGNOSIS — E78.49 OTHER HYPERLIPIDEMIA: ICD-10-CM

## 2022-05-02 DIAGNOSIS — Z00.00 ROUTINE GENERAL MEDICAL EXAMINATION AT A HEALTH CARE FACILITY: Primary | ICD-10-CM

## 2022-05-02 PROBLEM — N93.8 DUB (DYSFUNCTIONAL UTERINE BLEEDING): Status: RESOLVED | Noted: 2021-09-15 | Resolved: 2022-05-02

## 2022-05-02 LAB
CHOLEST SERPL-MCNC: 236 MG/DL
FASTING STATUS PATIENT QL REPORTED: NO
HDLC SERPL-MCNC: 60 MG/DL
LDLC SERPL CALC-MCNC: 147 MG/DL
TRIGL SERPL-MCNC: 146 MG/DL

## 2022-05-02 PROCEDURE — 80061 LIPID PANEL: CPT | Performed by: FAMILY MEDICINE

## 2022-05-02 PROCEDURE — 99396 PREV VISIT EST AGE 40-64: CPT | Performed by: FAMILY MEDICINE

## 2022-05-02 PROCEDURE — 36415 COLL VENOUS BLD VENIPUNCTURE: CPT | Performed by: FAMILY MEDICINE

## 2022-05-02 RX ORDER — ALPRAZOLAM 1 MG
TABLET ORAL
Qty: 30 TABLET | Refills: 0 | Status: SHIPPED | OUTPATIENT
Start: 2022-05-02 | End: 2022-05-26

## 2022-05-02 ASSESSMENT — ANXIETY QUESTIONNAIRES
5. BEING SO RESTLESS THAT IT IS HARD TO SIT STILL: MORE THAN HALF THE DAYS
7. FEELING AFRAID AS IF SOMETHING AWFUL MIGHT HAPPEN: SEVERAL DAYS
7. FEELING AFRAID AS IF SOMETHING AWFUL MIGHT HAPPEN: SEVERAL DAYS
4. TROUBLE RELAXING: NEARLY EVERY DAY
2. NOT BEING ABLE TO STOP OR CONTROL WORRYING: NEARLY EVERY DAY
GAD7 TOTAL SCORE: 18
1. FEELING NERVOUS, ANXIOUS, OR ON EDGE: NEARLY EVERY DAY
3. WORRYING TOO MUCH ABOUT DIFFERENT THINGS: NEARLY EVERY DAY
GAD7 TOTAL SCORE: 18
6. BECOMING EASILY ANNOYED OR IRRITABLE: NEARLY EVERY DAY

## 2022-05-02 ASSESSMENT — PATIENT HEALTH QUESTIONNAIRE - PHQ9
SUM OF ALL RESPONSES TO PHQ QUESTIONS 1-9: 18
SUM OF ALL RESPONSES TO PHQ QUESTIONS 1-9: 18
10. IF YOU CHECKED OFF ANY PROBLEMS, HOW DIFFICULT HAVE THESE PROBLEMS MADE IT FOR YOU TO DO YOUR WORK, TAKE CARE OF THINGS AT HOME, OR GET ALONG WITH OTHER PEOPLE: EXTREMELY DIFFICULT
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
## 2022-05-03 RX ORDER — ALPRAZOLAM 1 MG
TABLET ORAL
Qty: 30 TABLET | Refills: 0 | OUTPATIENT
Start: 2022-05-03

## 2022-05-03 ASSESSMENT — ANXIETY QUESTIONNAIRES: GAD7 TOTAL SCORE: 18

## 2022-05-03 NOTE — ASSESSMENT & PLAN NOTE
The patient is going through very stressful time at home as it relates to her marriage and I encouraged her strongly to connect with a psychotherapist.  The patient is unwilling to trial an SSRI as she has tried these in the past without good tolerability.  She continues on Xanax a half a tablet twice daily and a prescription refill was provided today.

## 2022-05-03 NOTE — ASSESSMENT & PLAN NOTE
The patient's LDL was in the moderate elevated range last year and I recommended rechecking a lipid panel at this visit

## 2022-05-25 DIAGNOSIS — F51.01 PRIMARY INSOMNIA: ICD-10-CM

## 2022-05-25 DIAGNOSIS — F41.1 GENERALIZED ANXIETY DISORDER: ICD-10-CM

## 2022-05-25 DIAGNOSIS — M54.2 NECK PAIN: ICD-10-CM

## 2022-05-26 RX ORDER — ALPRAZOLAM 1 MG
TABLET ORAL
Qty: 30 TABLET | Refills: 0 | Status: SHIPPED | OUTPATIENT
Start: 2022-05-26 | End: 2022-06-23

## 2022-05-26 RX ORDER — CYCLOBENZAPRINE HCL 10 MG
TABLET ORAL
Qty: 30 TABLET | Refills: 0 | Status: SHIPPED | OUTPATIENT
Start: 2022-05-26 | End: 2023-05-03

## 2022-05-26 RX ORDER — ZOLPIDEM TARTRATE 5 MG/1
TABLET ORAL
Qty: 15 TABLET | Refills: 0 | Status: SHIPPED | OUTPATIENT
Start: 2022-05-26 | End: 2022-07-20

## 2022-05-26 NOTE — TELEPHONE ENCOUNTER
Routing refill request to provider for review/approval because:  Drug not on the [facility] refill protocol     Last Written Prescription Date:  2/28/22  Last Fill Quantity: 30,  # refills: 1   Last office visit provider:  5/2/22     Requested Prescriptions   Pending Prescriptions Disp Refills     ALPRAZolam (XANAX) 1 MG tablet [Pharmacy Med Name: ALPRAZolam 1 MG Oral Tablet] 30 tablet 0     Sig: TAKE 1/2 (ONE-HALF) TABLET BY MOUTH IN THE MORNING AND 1/2 (ONE-HALF) AT BEDTIME       There is no refill protocol information for this order        zolpidem (AMBIEN) 5 MG tablet [Pharmacy Med Name: Zolpidem Tartrate 5 MG Oral Tablet] 15 tablet 0     Sig: TAKE 1 TABLET BY MOUTH AT BEDTIME AS NEEDED       There is no refill protocol information for this order        cyclobenzaprine (FLEXERIL) 10 MG tablet [Pharmacy Med Name: Cyclobenzaprine HCl 10 MG Oral Tablet] 30 tablet 0     Sig: TAKE 1 TABLET BY MOUTH NIGHTLY AS NEEDED FOR MUSCLE SPASM       There is no refill protocol information for this order          Ramon Landry RN 05/26/22 7:10 AM

## 2022-05-26 NOTE — TELEPHONE ENCOUNTER
Routing refill request to provider for review/approval because:  Controlled substance request    Last Written Prescription Date:  5/2/22  Last Fill Quantity: 30,  # refills: 0   Last office visit provider:  5/2/22     Last Written Prescription Date:  2/10/22  Last Fill Quantity: 15,  # refills: 0   Last office visit provider:  5/2/22    Requested Prescriptions   Pending Prescriptions Disp Refills     ALPRAZolam (XANAX) 1 MG tablet [Pharmacy Med Name: ALPRAZolam 1 MG Oral Tablet] 30 tablet 0     Sig: TAKE 1/2 (ONE-HALF) TABLET BY MOUTH IN THE MORNING AND 1/2 (ONE-HALF) AT BEDTIME       There is no refill protocol information for this order        zolpidem (AMBIEN) 5 MG tablet [Pharmacy Med Name: Zolpidem Tartrate 5 MG Oral Tablet] 15 tablet 0     Sig: TAKE 1 TABLET BY MOUTH AT BEDTIME AS NEEDED       There is no refill protocol information for this order        cyclobenzaprine (FLEXERIL) 10 MG tablet [Pharmacy Med Name: Cyclobenzaprine HCl 10 MG Oral Tablet] 30 tablet 0     Sig: TAKE 1 TABLET BY MOUTH NIGHTLY AS NEEDED FOR MUSCLE SPASM       There is no refill protocol information for this order          Ramon Landry RN 05/26/22 7:11 AM

## 2022-06-22 DIAGNOSIS — F41.1 GENERALIZED ANXIETY DISORDER: ICD-10-CM

## 2022-06-23 RX ORDER — ALPRAZOLAM 1 MG
TABLET ORAL
Qty: 30 TABLET | Refills: 0 | Status: SHIPPED | OUTPATIENT
Start: 2022-06-23 | End: 2022-07-20

## 2022-07-20 DIAGNOSIS — F51.01 PRIMARY INSOMNIA: ICD-10-CM

## 2022-07-20 DIAGNOSIS — F41.1 GENERALIZED ANXIETY DISORDER: ICD-10-CM

## 2022-07-20 RX ORDER — ALPRAZOLAM 1 MG
TABLET ORAL
Qty: 30 TABLET | Refills: 0 | Status: SHIPPED | OUTPATIENT
Start: 2022-07-20 | End: 2022-08-17

## 2022-07-20 RX ORDER — ZOLPIDEM TARTRATE 5 MG/1
TABLET ORAL
Qty: 15 TABLET | Refills: 0 | Status: SHIPPED | OUTPATIENT
Start: 2022-07-20 | End: 2022-10-07

## 2022-07-20 NOTE — TELEPHONE ENCOUNTER
Routing refill request to provider for review/approval because:  Drug not on the [facility] refill protocol     Last Written Prescription Date: 6/23/22 (alprazolam)  Last Fill Quantity: 30, # refills: 0    Last Written Prescription Date: 5/26/22 (zolpidem)  Last Fill Quantity: 15, # refills: 0  Last office visit provider: Rebecca 5/2/22        Requested Prescriptions   Pending Prescriptions Disp Refills    ALPRAZolam (XANAX) 1 MG tablet [Pharmacy Med Name: ALPRAZolam 1 MG Oral Tablet] 30 tablet 0     Sig: TAKE 1/2 (ONE-HALF) TABLET BY MOUTH IN THE MORNING AND 1/2 (ONE-HALF) AT BEDTIME        There is no refill protocol information for this order        zolpidem (AMBIEN) 5 MG tablet [Pharmacy Med Name: Zolpidem Tartrate 5 MG Oral Tablet] 15 tablet 0     Sig: TAKE 1 TABLET BY MOUTH AT BEDTIME AS NEEDED        There is no refill protocol information for this order               Alvina Emerson RN 07/20/22 11:59 AM

## 2022-08-16 DIAGNOSIS — F41.1 GENERALIZED ANXIETY DISORDER: ICD-10-CM

## 2022-08-17 RX ORDER — ALPRAZOLAM 1 MG
TABLET ORAL
Qty: 30 TABLET | Refills: 0 | Status: SHIPPED | OUTPATIENT
Start: 2022-08-17 | End: 2022-09-12

## 2022-09-12 DIAGNOSIS — F41.1 GENERALIZED ANXIETY DISORDER: ICD-10-CM

## 2022-09-12 RX ORDER — ALPRAZOLAM 1 MG
TABLET ORAL
Qty: 30 TABLET | Refills: 0 | Status: SHIPPED | OUTPATIENT
Start: 2022-09-12 | End: 2022-10-07

## 2022-09-25 ENCOUNTER — MYC MEDICAL ADVICE (OUTPATIENT)
Dept: FAMILY MEDICINE | Facility: CLINIC | Age: 42
End: 2022-09-25

## 2022-09-25 ENCOUNTER — HEALTH MAINTENANCE LETTER (OUTPATIENT)
Age: 42
End: 2022-09-25

## 2022-09-30 ENCOUNTER — ALLIED HEALTH/NURSE VISIT (OUTPATIENT)
Dept: FAMILY MEDICINE | Facility: CLINIC | Age: 42
End: 2022-09-30
Payer: COMMERCIAL

## 2022-09-30 VITALS — SYSTOLIC BLOOD PRESSURE: 124 MMHG | DIASTOLIC BLOOD PRESSURE: 80 MMHG

## 2022-09-30 DIAGNOSIS — Z00.00 ROUTINE GENERAL MEDICAL EXAMINATION AT HEALTH CARE FACILITY: Primary | ICD-10-CM

## 2022-09-30 PROCEDURE — 99207 PR NO CHARGE NURSE ONLY: CPT

## 2022-09-30 NOTE — PROGRESS NOTES
I met with Merle Jennings at the request of Dr. Fernandez to recheck her blood pressure.  Blood pressure medications on the med list were reviewed with patient.    Patient has taken all medications as per usual regimen: NA  Patient reports tolerating them without any issues or concerns: NA    Vitals:    09/30/22 0855   BP: 124/80   BP Location: Left arm   Patient Position: Sitting   Cuff Size: Adult Regular       Blood pressure was taken, previous encounter was reviewed, recorded blood pressure below 140/90.  Patient was discharged and the note will be sent to the provider for final review.     Venkata Dasilva RN  [facility]

## 2022-10-07 DIAGNOSIS — F51.01 PRIMARY INSOMNIA: ICD-10-CM

## 2022-10-07 DIAGNOSIS — F41.1 GENERALIZED ANXIETY DISORDER: ICD-10-CM

## 2022-10-07 RX ORDER — ALPRAZOLAM 1 MG
TABLET ORAL
Qty: 30 TABLET | Refills: 0 | Status: SHIPPED | OUTPATIENT
Start: 2022-10-07 | End: 2022-11-03

## 2022-10-07 RX ORDER — ZOLPIDEM TARTRATE 5 MG/1
TABLET ORAL
Qty: 15 TABLET | Refills: 0 | Status: SHIPPED | OUTPATIENT
Start: 2022-10-07 | End: 2022-12-01

## 2022-11-03 DIAGNOSIS — F41.1 GENERALIZED ANXIETY DISORDER: ICD-10-CM

## 2022-11-03 RX ORDER — ALPRAZOLAM 1 MG
TABLET ORAL
Qty: 30 TABLET | Refills: 0 | Status: SHIPPED | OUTPATIENT
Start: 2022-11-03 | End: 2022-12-01

## 2022-11-11 ENCOUNTER — NURSE TRIAGE (OUTPATIENT)
Dept: NURSING | Facility: CLINIC | Age: 42
End: 2022-11-11

## 2022-11-11 NOTE — TELEPHONE ENCOUNTER
Patient calling wondering if she needs to make a med-check appointment. Patient is not having symptoms-no triage needed. Offered to send her back to scheduling but she stated that she doesn't have time right now and will call back later.     RONY COLLADO RN    Reason for Disposition    Information only question and nurse able to answer    Additional Information    Negative: Nursing judgment    Negative: Nursing judgment    Negative: Nursing judgment    Negative: Nursing judgment    Protocols used: INFORMATION ONLY CALL - NO TRIAGE-A-[place]

## 2022-12-01 DIAGNOSIS — F41.1 GENERALIZED ANXIETY DISORDER: ICD-10-CM

## 2022-12-01 DIAGNOSIS — F51.01 PRIMARY INSOMNIA: ICD-10-CM

## 2022-12-01 RX ORDER — ALPRAZOLAM 1 MG
TABLET ORAL
Qty: 30 TABLET | Refills: 0 | Status: SHIPPED | OUTPATIENT
Start: 2022-12-01 | End: 2022-12-29

## 2022-12-01 RX ORDER — ZOLPIDEM TARTRATE 5 MG/1
TABLET ORAL
Qty: 15 TABLET | Refills: 0 | Status: SHIPPED | OUTPATIENT
Start: 2022-12-01 | End: 2022-12-29

## 2023-01-24 DIAGNOSIS — F51.01 PRIMARY INSOMNIA: ICD-10-CM

## 2023-01-24 DIAGNOSIS — F41.1 GENERALIZED ANXIETY DISORDER: ICD-10-CM

## 2023-01-25 ENCOUNTER — MYC MEDICAL ADVICE (OUTPATIENT)
Dept: FAMILY MEDICINE | Facility: CLINIC | Age: 43
End: 2023-01-25
Payer: COMMERCIAL

## 2023-01-25 DIAGNOSIS — F41.1 GENERALIZED ANXIETY DISORDER: ICD-10-CM

## 2023-01-25 DIAGNOSIS — F51.01 PRIMARY INSOMNIA: ICD-10-CM

## 2023-01-25 RX ORDER — ZOLPIDEM TARTRATE 5 MG/1
TABLET ORAL
Qty: 15 TABLET | Refills: 0 | Status: SHIPPED | OUTPATIENT
Start: 2023-01-25 | End: 2023-02-20

## 2023-01-25 RX ORDER — ALPRAZOLAM 1 MG
TABLET ORAL
Qty: 34 TABLET | Refills: 0 | Status: SHIPPED | OUTPATIENT
Start: 2023-01-25 | End: 2023-01-26

## 2023-01-26 RX ORDER — ALPRAZOLAM 1 MG
TABLET ORAL
Qty: 34 TABLET | Refills: 0 | Status: SHIPPED | OUTPATIENT
Start: 2023-01-26 | End: 2023-02-20

## 2023-02-09 ENCOUNTER — TELEPHONE (OUTPATIENT)
Dept: FAMILY MEDICINE | Facility: CLINIC | Age: 43
End: 2023-02-09
Payer: COMMERCIAL
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
## 2023-02-09 NOTE — TELEPHONE ENCOUNTER
Called pt and suggested she ask her  what she needs or should need. She can get anything out of her Mychart if they need it. Or would have to sign release for records

## 2023-02-19 DIAGNOSIS — F51.01 PRIMARY INSOMNIA: ICD-10-CM

## 2023-02-19 DIAGNOSIS — F41.1 GENERALIZED ANXIETY DISORDER: ICD-10-CM

## 2023-02-20 ENCOUNTER — MYC MEDICAL ADVICE (OUTPATIENT)
Dept: FAMILY MEDICINE | Facility: CLINIC | Age: 43
End: 2023-02-20
Payer: COMMERCIAL

## 2023-02-20 RX ORDER — ZOLPIDEM TARTRATE 5 MG/1
TABLET ORAL
Qty: 15 TABLET | Refills: 0 | Status: SHIPPED | OUTPATIENT
Start: 2023-02-20 | End: 2023-03-21

## 2023-02-20 RX ORDER — ALPRAZOLAM 1 MG
TABLET ORAL
Qty: 34 TABLET | Refills: 0 | Status: SHIPPED | OUTPATIENT
Start: 2023-02-20 | End: 2023-03-21

## 2023-03-21 DIAGNOSIS — F41.1 GENERALIZED ANXIETY DISORDER: ICD-10-CM

## 2023-03-21 DIAGNOSIS — F51.01 PRIMARY INSOMNIA: ICD-10-CM

## 2023-03-21 RX ORDER — ALPRAZOLAM 1 MG
TABLET ORAL
Qty: 34 TABLET | Refills: 0 | Status: SHIPPED | OUTPATIENT
Start: 2023-03-21 | End: 2023-04-17

## 2023-03-21 RX ORDER — ZOLPIDEM TARTRATE 5 MG/1
TABLET ORAL
Qty: 15 TABLET | Refills: 0 | Status: SHIPPED | OUTPATIENT
Start: 2023-03-21 | End: 2023-04-17

## 2023-04-16 DIAGNOSIS — F51.01 PRIMARY INSOMNIA: ICD-10-CM

## 2023-04-16 DIAGNOSIS — F41.1 GENERALIZED ANXIETY DISORDER: ICD-10-CM

## 2023-04-17 RX ORDER — ZOLPIDEM TARTRATE 5 MG/1
TABLET ORAL
Qty: 15 TABLET | Refills: 0 | Status: SHIPPED | OUTPATIENT
Start: 2023-04-17 | End: 2023-05-11

## 2023-04-17 RX ORDER — ALPRAZOLAM 1 MG
TABLET ORAL
Qty: 34 TABLET | Refills: 0 | Status: SHIPPED | OUTPATIENT
Start: 2023-04-17 | End: 2023-05-11

## 2023-04-17 NOTE — CONFIDENTIAL NOTE
PDMP reviewed.  The patient uses benzodiazepines for both anxiety daily and anxiety related to flying.  Refill today is consistent with the prescribing pattern.

## 2023-05-03 ENCOUNTER — OFFICE VISIT (OUTPATIENT)
Dept: FAMILY MEDICINE | Facility: CLINIC | Age: 43
End: 2023-05-03
Payer: COMMERCIAL

## 2023-05-03 VITALS
OXYGEN SATURATION: 100 % | HEIGHT: 67 IN | BODY MASS INDEX: 25.58 KG/M2 | SYSTOLIC BLOOD PRESSURE: 120 MMHG | WEIGHT: 163 LBS | DIASTOLIC BLOOD PRESSURE: 78 MMHG | HEART RATE: 92 BPM

## 2023-05-03 DIAGNOSIS — F51.05 INSOMNIA DUE TO OTHER MENTAL DISORDER: ICD-10-CM

## 2023-05-03 DIAGNOSIS — F41.1 GENERALIZED ANXIETY DISORDER: ICD-10-CM

## 2023-05-03 DIAGNOSIS — Z00.00 ROUTINE GENERAL MEDICAL EXAMINATION AT A HEALTH CARE FACILITY: Primary | ICD-10-CM

## 2023-05-03 DIAGNOSIS — Z11.3 SCREEN FOR STD (SEXUALLY TRANSMITTED DISEASE): ICD-10-CM

## 2023-05-03 DIAGNOSIS — F99 INSOMNIA DUE TO OTHER MENTAL DISORDER: ICD-10-CM

## 2023-05-03 DIAGNOSIS — E78.49 OTHER HYPERLIPIDEMIA: ICD-10-CM

## 2023-05-03 PROBLEM — R63.5 WEIGHT GAIN: Status: RESOLVED | Noted: 2021-09-26 | Resolved: 2023-05-03

## 2023-05-03 LAB
CHOLEST SERPL-MCNC: 203 MG/DL
DEPRECATED CALCIDIOL+CALCIFEROL SERPL-MC: 30 UG/L (ref 20–75)
HCV AB SERPL QL IA: NONREACTIVE
HDLC SERPL-MCNC: 59 MG/DL
HIV 1+2 AB+HIV1 P24 AG SERPL QL IA: NONREACTIVE
LDLC SERPL CALC-MCNC: 125 MG/DL
NONHDLC SERPL-MCNC: 144 MG/DL
TRIGL SERPL-MCNC: 96 MG/DL

## 2023-05-03 PROCEDURE — 80061 LIPID PANEL: CPT | Performed by: FAMILY MEDICINE

## 2023-05-03 PROCEDURE — 99396 PREV VISIT EST AGE 40-64: CPT | Performed by: FAMILY MEDICINE

## 2023-05-03 PROCEDURE — 87491 CHLMYD TRACH DNA AMP PROBE: CPT | Performed by: FAMILY MEDICINE

## 2023-05-03 PROCEDURE — 82306 VITAMIN D 25 HYDROXY: CPT | Performed by: FAMILY MEDICINE

## 2023-05-03 PROCEDURE — 87389 HIV-1 AG W/HIV-1&-2 AB AG IA: CPT | Performed by: FAMILY MEDICINE

## 2023-05-03 PROCEDURE — 86803 HEPATITIS C AB TEST: CPT | Performed by: FAMILY MEDICINE

## 2023-05-03 PROCEDURE — 36415 COLL VENOUS BLD VENIPUNCTURE: CPT | Performed by: FAMILY MEDICINE

## 2023-05-03 PROCEDURE — 87591 N.GONORRHOEAE DNA AMP PROB: CPT | Performed by: FAMILY MEDICINE

## 2023-05-03 ASSESSMENT — ENCOUNTER SYMPTOMS
MYALGIAS: 1
HEADACHES: 1
HEARTBURN: 0
SHORTNESS OF BREATH: 0
ARTHRALGIAS: 0
SORE THROAT: 0
COUGH: 0
FEVER: 0
EYE PAIN: 0
CONSTIPATION: 0
JOINT SWELLING: 0
HEMATOCHEZIA: 0
NERVOUS/ANXIOUS: 1
ABDOMINAL PAIN: 0
DIZZINESS: 0
FREQUENCY: 1
DYSURIA: 0
NAUSEA: 0
DIARRHEA: 0
HEMATURIA: 0
PALPITATIONS: 0
WEAKNESS: 0
CHILLS: 0
PARESTHESIAS: 0

## 2023-05-03 ASSESSMENT — PATIENT HEALTH QUESTIONNAIRE - PHQ9
SUM OF ALL RESPONSES TO PHQ QUESTIONS 1-9: 20
10. IF YOU CHECKED OFF ANY PROBLEMS, HOW DIFFICULT HAVE THESE PROBLEMS MADE IT FOR YOU TO DO YOUR WORK, TAKE CARE OF THINGS AT HOME, OR GET ALONG WITH OTHER PEOPLE: VERY DIFFICULT
SUM OF ALL RESPONSES TO PHQ QUESTIONS 1-9: 20

## 2023-05-03 NOTE — ASSESSMENT & PLAN NOTE
The patient struggles with high anxiety which has been exacerbated by her current divorce proceedings.  However, she is managing reasonably well at this time.  She has not done well with SSRI medication in the past and takes Xanax maximally 1 daily.

## 2023-05-03 NOTE — PROGRESS NOTES
SUBJECTIVE:   CC: Merle is an 43 year old who presents for preventive health visit.       5/3/2023     9:38 AM   Additional Questions   Roomed by as   Accompanied by self         5/3/2023     9:38 AM   Patient Reported Additional Medications   Patient reports taking the following new medications no     HPI: Merle is a 43-year-old female presenting today for her annual physical exam.  The patient is in the process of going through a rather challenging divorce and is a bit tearful today.  However, she does note that she has good support with family and is taking things a day at a time.    Patient has been advised of split billing requirements and indicates understanding: Yes  Healthy Habits:     Getting at least 3 servings of Calcium per day:  NO    Bi-annual eye exam:  Yes    Dental care twice a year:  Yes    Sleep apnea or symptoms of sleep apnea:  None    Diet:  Regular (no restrictions)    Frequency of exercise:  1 day/week    Duration of exercise:  15-30 minutes    Taking medications regularly:  Yes    Medication side effects:  None    PHQ-2 Total Score: 6    Additional concerns today:  No        Today's PHQ-2 Score:       5/3/2023     9:00 AM   PHQ-2 ( 1999 [employer])   Q1: Little interest or pleasure in doing things 3   Q2: Feeling down, depressed or hopeless 3   PHQ-2 Score 6   Q1: Little interest or pleasure in doing things Nearly every day    Nearly every day   Q2: Feeling down, depressed or hopeless Nearly every day    Nearly every day   PHQ-2 Score 6    6           Social History     Tobacco Use     Smoking status: Never     Smokeless tobacco: Never   Vaping Use     Vaping status: Not on file   Substance Use Topics     Alcohol use: Not on file             5/3/2023     9:00 AM   Alcohol Use   Prescreen: >3 drinks/day or >7 drinks/week? No     Reviewed orders with patient.  Reviewed health maintenance and updated orders accordingly - Yes  Patient Active Problem List   Diagnosis     Generalized anxiety  disorder     Other hyperlipidemia     Primary Female Infertility     Dysplastic Nevus     Insomnia     PMS (premenstrual syndrome)     Past Surgical History:   Procedure Laterality Date     [facility] APPENDECTOMY      Description: Appendectomy;  Recorded: 2010;       Social History     Tobacco Use     Smoking status: Never     Smokeless tobacco: Never   Vaping Use     Vaping status: Not on file   Substance Use Topics     Alcohol use: Not on file     No family history on file.      Current Outpatient Medications   Medication Sig Dispense Refill     ALPRAZolam (XANAX) 1 MG tablet TAKE 1/2 (ONE-HALF) TABLET BY MOUTH TWICE DAILY AND 1 TABLET EVERY 6 HOURS AS NEEDED FOR ANXIETY RELATED  TO  FLYING 34 tablet 0     levonorgestrel (MIRENA) 20 MCG/24HR IUD 1 each (20 mcg) by Intrauterine route once       multivitamin therapeutic tablet [MULTIVITAMIN THERAPEUTIC TABLET] Take 1 tablet by mouth daily.       zolpidem (AMBIEN) 5 MG tablet TAKE 1 TABLET BY MOUTH AT BEDTIME AS NEEDED 15 tablet 0     No Known Allergies    Breast Cancer Screenin/2/2022     2:40 PM 5/3/2023     9:00 AM   Breast CA Risk Assessment (FHS-7)   Do you have a family history of breast, colon, or ovarian cancer? Yes No / Unknown       Mammogram Screening - Offered annual screening and updated Health Maintenance for mutual plan based on risk factor consideration    Pertinent mammograms are reviewed under the imaging tab.    History of abnormal Pap smear: NO - age 30-65 PAP every 5 years with negative HPV co-testing recommended      Latest Ref Rng & Units 2021    10:09 AM 2015    12:00 PM   PAP / HPV   PAP Negative for squamous intraepithelial lesion or malignancy. Negative for squamous intraepithelial lesion or malignancy  Electronically signed by Mayte Engle CT (ASCP) on 2/10/2021 at 11:38 AM     Negative for squamous intraepithelial lesion or malignancy  Electronically signed by Priti Pichardo CT (ASCP) on 2015 at  3:55 PM    "    HPV 16 DNA NEG Negative      HPV 18 DNA NEG Negative      Other HR HPV NEG Negative        Reviewed and updated as needed this visit by clinical staff    Allergies  Meds              Reviewed and updated as needed this visit by Provider                     Review of Systems   Constitutional: Negative for chills and fever.   HENT: Negative for congestion, ear pain, hearing loss and sore throat.    Eyes: Positive for visual disturbance. Negative for pain.   Respiratory: Negative for cough and shortness of breath.    Cardiovascular: Negative for chest pain and palpitations.   Gastrointestinal: Negative for abdominal pain, constipation, diarrhea, heartburn, hematochezia and nausea.   Breasts:  Negative for tenderness and discharge.   Genitourinary: Positive for frequency. Negative for dysuria, genital sores, hematuria, pelvic pain, urgency, vaginal bleeding and vaginal discharge.   Musculoskeletal: Positive for myalgias. Negative for arthralgias and joint swelling.   Skin: Negative for rash.   Neurological: Positive for headaches. Negative for dizziness, weakness and paresthesias.   Psychiatric/Behavioral: Positive for mood changes. The patient is nervous/anxious.         OBJECTIVE:   BP (!) 146/91 (BP Location: Left arm, Patient Position: Left side, Cuff Size: Adult Large)   Pulse 92   Ht 1.702 m (5' 7\")   Wt 73.9 kg (163 lb)   SpO2 100%   BMI 25.53 kg/m    Physical Exam  GENERAL: healthy, alert and no distress  EYES: Eyes grossly normal to inspection, PERRL and conjunctivae and sclerae normal  HENT: ear canals and TM's normal, nose and mouth without ulcers or lesions  NECK: no adenopathy, no asymmetry, masses, or scars and thyroid normal to palpation  RESP: lungs clear to auscultation - no rales, rhonchi or wheezes  BREAST: normal without masses, tenderness or nipple discharge and no palpable axillary masses or adenopathy  CV: regular rate and rhythm, normal S1 S2, no S3 or S4, no murmur, click or rub, no " "peripheral edema and peripheral pulses strong  ABDOMEN: soft, nontender, no hepatosplenomegaly, no masses and bowel sounds normal  MS: no gross musculoskeletal defects noted, no edema  SKIN: no suspicious lesions or rashes  NEURO: Normal strength and tone, mentation intact and speech normal  PSYCH: mentation appears normal, affect normal/bright    Labs reviewed in Epic    ASSESSMENT/PLAN:     Problem List Items Addressed This Visit        Endocrine    Other hyperlipidemia     Reviewed previous cholesterol results with the patient and encouraged the patient to try to watch a more heart healthy diet as well as exercise more often.  Rechecked a lipid panel today and will get back to the patient regarding any further recommendations.         Relevant Orders    Lipid Profile (Chol, Trig, HDL, LDL calc)       Behavioral    Generalized anxiety disorder     The patient struggles with high anxiety which has been exacerbated by her current divorce proceedings.  However, she is managing reasonably well at this time.  She has not done well with SSRI medication in the past and takes Xanax maximally 1 daily.            Other    Insomnia     Continues with Ambien but takes it sparingly as needed.        Other Visit Diagnoses     Routine general medical examination at a health care facility    -  Primary    Relevant Orders    Vitamin D Deficiency    Screen for STD (sexually transmitted disease)        Relevant Orders    HIV Antigen Antibody Combo    Hepatitis C antibody    Chlamydia & Gonorrhea by PCR, [facility]/Range - Clinic Collect            COUNSELING:  Reviewed preventive health counseling, as reflected in patient instructions       Regular exercise       Healthy diet/nutrition       Osteoporosis prevention/bone health       Colorectal Cancer Screening      BMI:   Estimated body mass index is 25.53 kg/m  as calculated from the following:    Height as of this encounter: 1.702 m (5' 7\").    Weight as of this encounter: 73.9 kg (163 " lb).         She reports that she has never smoked. She has never used smokeless tobacco.      SIRIA JACKSON MD  [facility]  Answers for HPI/ROS submitted by the patient on 5/3/2023  If you checked off any problems, how difficult have these problems made it for you to do your work, take care of things at home, or get along with other people?: Very difficult  PHQ9 TOTAL SCORE: 20

## 2023-05-03 NOTE — ASSESSMENT & PLAN NOTE
Reviewed previous cholesterol results with the patient and encouraged the patient to try to watch a more heart healthy diet as well as exercise more often.  Rechecked a lipid panel today and will get back to the patient regarding any further recommendations.

## 2023-05-04 LAB
C TRACH DNA SPEC QL PROBE+SIG AMP: NEGATIVE
N GONORRHOEA DNA SPEC QL NAA+PROBE: NEGATIVE

## 2023-05-11 DIAGNOSIS — F41.1 GENERALIZED ANXIETY DISORDER: ICD-10-CM

## 2023-05-11 DIAGNOSIS — F51.01 PRIMARY INSOMNIA: ICD-10-CM

## 2023-05-11 RX ORDER — ALPRAZOLAM 1 MG
TABLET ORAL
Qty: 34 TABLET | Refills: 0 | Status: SHIPPED | OUTPATIENT
Start: 2023-05-11 | End: 2023-06-13

## 2023-05-11 RX ORDER — ZOLPIDEM TARTRATE 5 MG/1
TABLET ORAL
Qty: 15 TABLET | Refills: 0 | Status: SHIPPED | OUTPATIENT
Start: 2023-05-11 | End: 2023-06-13

## 2023-06-12 DIAGNOSIS — F41.1 GENERALIZED ANXIETY DISORDER: ICD-10-CM

## 2023-06-12 DIAGNOSIS — F51.01 PRIMARY INSOMNIA: ICD-10-CM

## 2023-06-13 RX ORDER — ZOLPIDEM TARTRATE 5 MG/1
TABLET ORAL
Qty: 15 TABLET | Refills: 0 | Status: SHIPPED | OUTPATIENT
Start: 2023-06-13 | End: 2023-07-12

## 2023-06-13 RX ORDER — ALPRAZOLAM 1 MG
TABLET ORAL
Qty: 34 TABLET | Refills: 0 | Status: SHIPPED | OUTPATIENT
Start: 2023-06-13 | End: 2023-07-12

## 2023-06-13 NOTE — TELEPHONE ENCOUNTER
Covering provider. PDMP reviewed. No suspicious fills or prescriptions. Last refills provided 5/11. 30 day supply consistent with primary prescriber's plan as dictated in her documentation. Refills provided today.

## 2023-07-12 DIAGNOSIS — F41.1 GENERALIZED ANXIETY DISORDER: ICD-10-CM

## 2023-07-12 DIAGNOSIS — F51.01 PRIMARY INSOMNIA: ICD-10-CM

## 2023-07-12 NOTE — TELEPHONE ENCOUNTER
Medication Request  Medication name: zolpidem (AMBIEN) 5 MG tablet  ALPRAZolam (XANAX) 1 MG tablet  Requested Pharmacy: [id number]  When was patient last seen for this?:  05/03/23  Patient offered appointment:  [facility] pharmacy request  Okay to leave a detailed message: no

## 2023-07-13 RX ORDER — ZOLPIDEM TARTRATE 5 MG/1
5 TABLET ORAL
Qty: 15 TABLET | Refills: 0 | Status: SHIPPED | OUTPATIENT
Start: 2023-07-13 | End: 2023-08-07

## 2023-07-13 RX ORDER — ALPRAZOLAM 1 MG
TABLET ORAL
Qty: 34 TABLET | Refills: 0 | Status: SHIPPED | OUTPATIENT
Start: 2023-07-13 | End: 2023-08-07

## 2023-08-07 DIAGNOSIS — F51.01 PRIMARY INSOMNIA: ICD-10-CM

## 2023-08-07 DIAGNOSIS — F41.1 GENERALIZED ANXIETY DISORDER: ICD-10-CM

## 2023-08-07 RX ORDER — ZOLPIDEM TARTRATE 5 MG/1
5 TABLET ORAL
Qty: 15 TABLET | Refills: 0 | Status: SHIPPED | OUTPATIENT
Start: 2023-08-07 | End: 2023-09-07

## 2023-08-07 RX ORDER — ALPRAZOLAM 1 MG
TABLET ORAL
Qty: 34 TABLET | Refills: 0 | Status: SHIPPED | OUTPATIENT
Start: 2023-08-07 | End: 2023-09-10

## 2023-08-10 DIAGNOSIS — F41.1 GENERALIZED ANXIETY DISORDER: ICD-10-CM

## 2023-08-10 RX ORDER — ALPRAZOLAM 1 MG
TABLET ORAL
Qty: 34 TABLET | Refills: 0 | OUTPATIENT
Start: 2023-08-10

## 2023-09-07 DIAGNOSIS — F51.01 PRIMARY INSOMNIA: ICD-10-CM

## 2023-09-07 RX ORDER — ZOLPIDEM TARTRATE 5 MG/1
5 TABLET ORAL
Qty: 15 TABLET | Refills: 1 | Status: SHIPPED | OUTPATIENT
Start: 2023-09-07 | End: 2023-11-06

## 2023-09-08 DIAGNOSIS — F41.1 GENERALIZED ANXIETY DISORDER: ICD-10-CM

## 2023-09-10 RX ORDER — ALPRAZOLAM 1 MG
TABLET ORAL
Qty: 34 TABLET | Refills: 0 | Status: SHIPPED | OUTPATIENT
Start: 2023-09-10 | End: 2023-10-11

## 2023-09-20 ENCOUNTER — TELEPHONE (OUTPATIENT)
Dept: FAMILY MEDICINE | Facility: CLINIC | Age: 43
End: 2023-09-20
Payer: COMMERCIAL

## 2023-09-20 RX ORDER — BUSPIRONE HYDROCHLORIDE 30 MG/1
30 TABLET ORAL DAILY
COMMUNITY
End: 2023-10-04

## 2023-09-20 NOTE — TELEPHONE ENCOUNTER
Jeimy calling to say that she would like to get herself organized prior to her divorce.  She is unsure of how her insurance will look next year and she has 2 requests:    Jeimy would like all her medication to be prescribed by 1 provider.  Requesting that Dr Fernandez take over her RX  Buspirone 30 mg, she is titrating up to 30 mg and is currently on 10 mg po daily.  Med check appointment was scheduled for 10/4/23 at 9:20 am.     Jeimy would also like to complete her colonoscopy prior to next year.  She will turn 44 in January but would like it completed in 2023.  Jeimy will call insurance to find out if it is covered.  She is OK with waiting to have discuss, at the appointment on 10/4/23.    Routing to Dr Fernandez as an FYI  Pended colonoscopy if Dr Fernandez recommends referral.

## 2023-09-20 NOTE — TELEPHONE ENCOUNTER
Spoke with Jeimy regarding her vaginal itchiness.  She is stating that the external labia itch and the discharge is stringy and not chunky.      She was placed on antibiotics and she had discharge from her vagina and took Monistat and the yeast infection appeared to resolve.  She had her period and now she has itchiness and the discharge is stringy and she is wondering if she can get medication for the infection.      This RN asked if she thought she had BV and not yeast.  Merle is unsure.  Appointment scheduled with Dunia Grant NP to examine on Friday 9/22/23.      Routing to Dunia Grant NP as an FYI for her Friday appointment

## 2023-09-20 NOTE — TELEPHONE ENCOUNTER
Called Merle and relayed message from Dr Fernandez as written.  She will call the insurance company.

## 2023-09-20 NOTE — TELEPHONE ENCOUNTER
Symptoms    Describe your symptoms: Yeast infection sympotms    Any pain: Yes: painful itching    How long have you been having symptoms: 1+  weeks    Have you been seen for this:  No    Appointment offered?: Yes: appt offered and patient declined. Evisit explained and patient declined.    Triage offered?: No, clinic RN unavailable at time of call.     Home remedies tried: Patient has tried Monistat without relief and would like to speak to RN about other remedies or OTC options.    Preferred Pharmacy:   [address] [facility]  [address] [facility] [address]  Phone: [phone] Fax: [phone]      Could we send this information to you in [facility] or would you prefer to receive a phone call?:   Patient would prefer a phone call   Okay to leave a detailed message?: Yes at Cell number on file:    Telephone Information:   Mobile [phone]

## 2023-09-20 NOTE — TELEPHONE ENCOUNTER
I will plan to discuss this all with her on October 4.  I definitely recommend checking with insurance as I am concerned that screening colonoscopy will not be covered if it is prior to the age of 44.

## 2023-09-22 ENCOUNTER — OFFICE VISIT (OUTPATIENT)
Dept: FAMILY MEDICINE | Facility: CLINIC | Age: 43
End: 2023-09-22
Payer: COMMERCIAL

## 2023-09-22 ENCOUNTER — TELEPHONE (OUTPATIENT)
Dept: FAMILY MEDICINE | Facility: CLINIC | Age: 43
End: 2023-09-22

## 2023-09-22 VITALS
WEIGHT: 164.1 LBS | HEIGHT: 67 IN | TEMPERATURE: 98.2 F | DIASTOLIC BLOOD PRESSURE: 84 MMHG | RESPIRATION RATE: 16 BRPM | OXYGEN SATURATION: 99 % | SYSTOLIC BLOOD PRESSURE: 126 MMHG | HEART RATE: 74 BPM | BODY MASS INDEX: 25.75 KG/M2

## 2023-09-22 DIAGNOSIS — B37.31 YEAST INFECTION OF THE VAGINA: ICD-10-CM

## 2023-09-22 DIAGNOSIS — N89.8 VAGINAL DISCHARGE: ICD-10-CM

## 2023-09-22 DIAGNOSIS — B96.89 BACTERIAL VAGINOSIS: ICD-10-CM

## 2023-09-22 DIAGNOSIS — N89.8 VAGINAL ITCHING: Primary | ICD-10-CM

## 2023-09-22 DIAGNOSIS — N76.0 BACTERIAL VAGINOSIS: ICD-10-CM

## 2023-09-22 LAB
CLUE CELLS: PRESENT
TRICHOMONAS, WET PREP: ABNORMAL
WBC'S/HIGH POWER FIELD, WET PREP: ABNORMAL
YEAST, WET PREP: PRESENT

## 2023-09-22 PROCEDURE — 87210 SMEAR WET MOUNT SALINE/INK: CPT

## 2023-09-22 PROCEDURE — 87491 CHLMYD TRACH DNA AMP PROBE: CPT

## 2023-09-22 PROCEDURE — 87591 N.GONORRHOEAE DNA AMP PROB: CPT

## 2023-09-22 PROCEDURE — 99213 OFFICE O/P EST LOW 20 MIN: CPT

## 2023-09-22 RX ORDER — METRONIDAZOLE 7.5 MG/G
1 GEL VAGINAL DAILY
Qty: 25 G | Refills: 0 | Status: SHIPPED | OUTPATIENT
Start: 2023-09-22 | End: 2023-09-27

## 2023-09-22 RX ORDER — FLUCONAZOLE 150 MG/1
150 TABLET ORAL ONCE
Qty: 2 TABLET | Refills: 0 | Status: SHIPPED | OUTPATIENT
Start: 2023-09-22 | End: 2023-09-22

## 2023-09-22 RX ORDER — METRONIDAZOLE 500 MG/1
500 TABLET ORAL 2 TIMES DAILY
Qty: 14 TABLET | Refills: 0 | Status: SHIPPED | OUTPATIENT
Start: 2023-09-22 | End: 2023-09-22

## 2023-09-22 NOTE — PROGRESS NOTES
Assessment & Plan   Problem List Items Addressed This Visit          Urinary    Bacterial vaginosis     Wet prep significant for both clue cells and yeast today. Discussed treatment options for these and sent in appropriate prescriptions. Encouraged patient to abstain from sexual activity during treatment. Discussed transmission potential to partner is low for yeast infection, however would still recommend abstaining. Patient expresses concern that she has had 3 BV infections in the last year or so. We discussed various causes of BV. She is unable to tell if symptoms cleared from her previous diagnosis, so discussed it's more likely that these are repeat infections as opposed to a persistent infection that was not responsive to appropriate treatment. Information regarding risk factors for BV included in after visit summary. I also encouraged her to discuss with gynecology, as she thinks this has happened after her IUD was placed. She will plan to discuss with her PCP at an upcoming appointment. Patient expressed an understanding of and agreement with this plan. All questions were answered at the conclusion of our visit.          Relevant Medications    metroNIDAZOLE (METROGEL) 0.75 % vaginal gel     Other Visit Diagnoses       Vaginal itching    -  Primary    Relevant Orders    Wet prep - Clinic Collect (Completed)    NEISSERIA GONORRHOEA PCR    CHLAMYDIA TRACHOMATIS PCR    Vaginal discharge        Relevant Orders    Wet prep - Clinic Collect (Completed)    NEISSERIA GONORRHOEA PCR    CHLAMYDIA TRACHOMATIS PCR    Yeast infection of the vagina        Relevant Medications    fluconazole (DIFLUCAN) 150 MG tablet           NEW [facility]    Mely Bloom is a 43 year old, presenting for the following health issues:  Vaginal Problem (Possible yeast infection)        9/22/2023     9:25 AM   Additional Questions   Roomed by ac   Accompanied by self     Patient had a  "root canal and was given antibiotics. After this, she developed a yeast infection. She took Monistat about a week ago. After that, her menstrual cycle started.  For the last 3-4 days, she has had extreme itching and pain.  She has no urinary symptoms, include painful urination, burning with urination, hematuria, abdominal pain or pelvic pain.  Itching is both external and internal.   No pain.  Vaginal discharge, tapering off. Is clear. Prior, was stringy and white.  No odor.   No new sexual partners, but would like STI testing today.    History of Present Illness       Reason for visit:  Iching    She eats 0-1 servings of fruits and vegetables daily.She consumes 1 sweetened beverage(s) daily.She exercises with enough effort to increase her heart rate 30 to 60 minutes per day.  She exercises with enough effort to increase her heart rate 5 days per week.   She is taking medications regularly.     Review of Systems   Genitourinary:  Positive for vaginal discharge.         Objective    /84 (BP Location: Left arm, Patient Position: Sitting, Cuff Size: Adult Regular)   Pulse 74   Temp 98.2  F (36.8  C) (Oral)   Resp 16   Ht 1.702 m (5' 7\")   Wt 74.4 kg (164 lb 1.6 oz)   SpO2 99%   BMI 25.70 kg/m    Body mass index is 25.7 kg/m .    Physical Exam  Vitals and nursing note reviewed.   Constitutional:       General: She is not in acute distress.     Appearance: Normal appearance.   Genitourinary:     Labia:         Right: No tenderness or lesion.         Left: No tenderness or lesion.       Cervix: Discharge (moderate amount of white/yellow discharge present at cervical os and vaginal canal) present.      Comments: Mild redness to bilateral labia  IUD strings visualized  Neurological:      Mental Status: She is alert.        Results for orders placed or performed in visit on 09/22/23 (from the past 24 hour(s))   Wet prep - Clinic Collect    Specimen: Vagina; Swab   Result Value Ref Range    Trichomonas Absent " Absent    Yeast Present (A) Absent    Clue Cells Present (A) Absent    WBCs/high power field 4+ (A) None

## 2023-09-22 NOTE — ASSESSMENT & PLAN NOTE
Wet prep significant for both clue cells and yeast today. Discussed treatment options for these and sent in appropriate prescriptions. Encouraged patient to abstain from sexual activity during treatment. Discussed transmission potential to partner is low for yeast infection, however would still recommend abstaining. Patient expresses concern that she has had 3 BV infections in the last year or so. We discussed various causes of BV. She is unable to tell if symptoms cleared from her previous diagnosis, so discussed it's more likely that these are repeat infections as opposed to a persistent infection that was not responsive to appropriate treatment. Information regarding risk factors for BV included in after visit summary. I also encouraged her to discuss with gynecology, as she thinks this has happened after her IUD was placed. She will plan to discuss with her PCP at an upcoming appointment. Patient expressed an understanding of and agreement with this plan. All questions were answered at the conclusion of our visit.

## 2023-09-22 NOTE — COMMUNITY RESOURCES LIST (ENGLISH)
09/22/2023   [facility] - Outpatient Clinics  N/A  For additional resource needs, please contact your health insurance member services or your primary care team.  Phone: [phone]   Email: N/A   Address: [address] [place] [id number]   Hours: N/A        Financial Stability       Rent and mortgage payment assistance  1  [facility] Distance: 2.05 miles      [address] [place] [address]  Language: English, Guyanese  Hours: Mon 9:30 AM - 11:30 AM , Tue 1:30 PM - 7:30 PM , Thu 9:00 AM - 7:00 PM , Fri 9:30 AM - 11:30 AM   Phone: (746) 479-4961 Email: info@[employer].Expandly Website: http://[employer].org/     2  [facility] Distance: 5.42 miles      In-Person, Phone/AirPOS   [address] [place] [address]  Language: English, Guyanese  Hours: Mon - Thu 8:00 AM - 4:00 PM  Fees: Free   Phone: (188) 160-4336 Email: center@[facility] [employer] (ChatAnd) Website: https://www.[facility]s.org          Food and Nutrition       Food pantry  3  [facility] - Food Shelf Distance: 2.05 miles      [facility]   [address] [place] [address]  Language: English, Guyanese  Hours: Mon 9:30 AM - 11:30 AM , Wed 9:30 AM - 11:30 AM , Thu 1:30 PM - 6:30 PM , Fri 9:30 AM - 11:30 AM  Fees: Free   Phone: (193) 284-5949 Email: info@[employer].Expandly Website: http://[employer].org/     4  [facility] Distance: 3.44 miles      In-Person, Pickup   [address] [place] [address]  Language: English  Hours: Mon - Thu 9:00 AM - 10:00 AM  Fees: Free   Phone: (589) 686-5775 Email: communication@[employer].org Website: http://www.[facility].org     SNAP application assistance  5  [employer] [place] Distance: 12.79 miles      Phone/AirPOS   [address] [place] [address]  Language: English, Hmong, Citizen of [place], Stateless, Guyanese  Hours: Mon - Fri 8:30 AM - 4:30 PM  Fees: Free   Phone: (311) 472-5421 Email:  helpline@[employer].org Website: https://www.hungersoTRADE [employer] REBATEions.org/programs/[place]-food-helpline/     6  [facility] - Economic Support Distance: 2.97 miles      In-Person   [address] [place] [address]  Language: English  Hours: Mon - Fri 8:00 AM - 4:30 PM  Fees: Free   Phone: (329) 253-1956 Email: bernardo@[facility]. Website: https://www.[facility]./787/Economic-Support     Soup kitchen or free meals  7  [facility] - Thursday Night Community Meal Distance: 5.42 miles      In-Person   [address] [place] [address]  Language: English, Bulgarian  Hours: Thu 6:00 PM - 7:00 PM  Fees: Free   Phone: (653) 326-3560 Email: center@[employer]s.org Website: https://www.saintandrews.org     8  [facility] Meal for Everyone (HOME) Distance: 5.46 miles      [facility]   [address] [place] [address]  Language: English  Hours: Thu 5:30 PM - 6:30 PM  Fees: Free   Phone: (240) 846-7716 Email: jesse@[employer].ru Website: http://Soocial.InterviewBest/          Important Numbers & Websites       [address].org  Poison Control   (634) 313-2585 [place]  Suicide and Crisis Lifeline   [address] [place], [place] [address]line.org  Childhelp National Child Abuse Hotline   699.277.4676 Childhelphotline.org  National Sexual Assault Hotline   (439) 127-5031 (HOPE) Rainn.org  National Runaway Safeline   (741) 387-3141 (RUNAWAY) [facility].org  Pregnancy & Postpartum Support [place]   Call/text 188-429-3535 Ppsupport[place].org  Substance Abuse National Helpline ([facility]   091-280-HELP (6078) Findtreatment.gov  Emergency Services   911

## 2023-09-22 NOTE — COMMUNITY RESOURCES LIST (ENGLISH)
09/22/2023   [facility]  N/A  For questions about this resource list or additional care needs, please contact your primary care clinic or care manager.  Phone: [phone]   Email: N/A   Address: [address] [place] [address]   Hours: N/A        Financial Stability       Rent and mortgage payment assistance  1  [place] [facility] Distance: 2.05 miles      [address] [place] [address]  Language: English, Chadian  Hours: Mon 9:30 AM - 11:30 AM , Tue 1:30 PM - 7:30 PM , Thu 9:00 AM - 7:00 PM , Fri 9:30 AM - 11:30 AM   Phone: (504) 953-8938 Email: info@[employer] Website: http://LemonQuest.org/     2  [facility] Distance: 5.42 miles      In-Person, Phone/[address] [address]  Language: English, Chadian  Hours: Mon - Thu 8:00 AM - 4:00 PM  Fees: Free   Phone: (200) 336-2088 Email: center@[facility] Website: https://www.saintandrews.org          Food and Nutrition       Food pantry  3  [place] [facility] - Food Shelf Distance: 2.05 miles      Pick   [address] [place] [address]  Language: English, Chadian  Hours: Mon 9:30 AM - 11:30 AM , Wed 9:30 AM - 11:30 AM , Thu 1:30 PM - 6:30 PM , Fri 9:30 AM - 11:30 AM  Fees: Free   Phone: (561) 258-6550 Email: info@[employer] Website: http://[employer] Surveillance/     4  [facility] Shelf Distance: 3.44 miles      In-Person, Pickup   [address] [place] [address]  Language: English  Hours: Mon - Thu 9:00 AM - 10:00 AM  Fees: Free   Phone: (411) 560-6775 Email: communication@[employer].org Website: http://www.norin.[facility]idence [facility]. [facility].org     SNAP application assistance  5  [facility] - Economic Support Distance: 2.97 miles      In-Person   [address] [place] [address]  Language: English  Hours: Mon - Fri 8:00 AM - 4:30 PM  Fees: Free   Phone: (134) 141-8562 Email:  bernardo@[facility]. Website: https://www.[facility]./787/Economic-Support     6  [facility] - Economic Support Distance: 7.82 miles      In-Person, Phone/Virtual   [address] [place] [address]  Language: English  Hours: Mon - Fri 8:00 AM - 4:30 PM  Fees: Free   Phone: (932) 842-5235 Email: rahat@[facility]. Website: https://www.[facility]./787/Economic-Support     Soup kitchen or free meals  7  [facility] - Family [facility] - Thursday Night Community Meal Distance: 5.42 miles      In-Person   [address] [place] [address]  Language: English, Greenlandic  Hours: Thu 6:00 PM - 7:00 PM  Fees: Free   Phone: (635) 128-7295 Email: center@[facility]aintandrews.org Website: https://www.saintandrews.org     8  [facility] Meal for Everyone (HOME) Distance: 5.46 miles      [place] [address] [place] [address]  Language: English  Hours: Thu 5:30 PM - 6:30 PM  Fees: Free   Phone: (704) 995-8645 Email: jesse@[employer] Website: http://[employer]/          Important Numbers & Websites       Emergency Services   911  [facility]   311  Poison Control   (306) 482-7893  Suicide Prevention Lifeline   (824) 117-2428 (TALK)  Child Abuse Hotline   (376) 862-3754 (4-A-Child)  Sexual Assault Hotline   (188) 781-6175 (HOPE)  National Runaway Safeline   (273) 547-4578 (RUNAWAY)  All-Options Talkline   (534) 474-9554  Substance Abuse Referral   (513) 975-4149 (HELP)

## 2023-09-22 NOTE — TELEPHONE ENCOUNTER
General Call    Contacts         Type Contact Phone/Fax    09/22/2023 11:33 AM CDT Phone (Incoming) JenningsMerle (Self) [phone] (M)          Reason for Call:  Prescription Change Request    What are your questions or concerns:  Patient was seen today and prescribed metroNIDAZOLE (FLAGYL) 500 MG tablet. After speaking with pharmacist, patient would like to change to metronidazole gel. Please advise on new prescription.    Date of last appointment with provider: Today    Could we send this information to you in [employer] or would you prefer to receive a phone call?:   Patient would prefer a phone call   Okay to leave a detailed message?: Yes at Cell number on file:    Telephone Information:   Mobile [phone]

## 2023-09-23 LAB
C TRACH DNA SPEC QL NAA+PROBE: NEGATIVE
N GONORRHOEA DNA SPEC QL NAA+PROBE: NEGATIVE

## 2023-09-26 ENCOUNTER — TELEPHONE (OUTPATIENT)
Dept: FAMILY MEDICINE | Facility: CLINIC | Age: 43
End: 2023-09-26
Payer: COMMERCIAL

## 2023-09-26 NOTE — TELEPHONE ENCOUNTER
General Call    Contacts         Type Contact Phone/Fax    09/26/2023 02:36 PM CDT Phone (Incoming) Merle Jennings (Self) [phone] (M)          Reason for Call:  Patient called in to see if she should take the remainder of her diflucan. She told me that she feels like her symptoms are improving and does not think she needs to take the rest. I told her if this was prescribed and there are two doses that unless she is having an allergic reaction that she should finish up her dosage (last pill) for this medication. She had no further questions and just wanted to double check.     Date of last appointment with provider: 9-    Could we send this information to you in [employer] or would you prefer to receive a phone call?:   Patient would prefer a phone call   Okay to leave a detailed message?: Yes at Cell number on file:    Telephone Information:   Mobile [phone]

## 2023-09-26 NOTE — TELEPHONE ENCOUNTER
Detailed vm left for patient indicating 2nd tablet to be taken in 72hrs after first tablet if symptoms have not resolved. Encouraged patient to call clinic back with further questions/concerns.

## 2023-09-28 ENCOUNTER — TRANSFERRED RECORDS (OUTPATIENT)
Dept: HEALTH INFORMATION MANAGEMENT | Facility: CLINIC | Age: 43
End: 2023-09-28

## 2023-10-04 ENCOUNTER — OFFICE VISIT (OUTPATIENT)
Dept: FAMILY MEDICINE | Facility: CLINIC | Age: 43
End: 2023-10-04
Payer: COMMERCIAL

## 2023-10-04 VITALS
HEART RATE: 78 BPM | DIASTOLIC BLOOD PRESSURE: 80 MMHG | OXYGEN SATURATION: 99 % | SYSTOLIC BLOOD PRESSURE: 115 MMHG | RESPIRATION RATE: 18 BRPM | TEMPERATURE: 98.1 F

## 2023-10-04 DIAGNOSIS — F51.05 INSOMNIA DUE TO OTHER MENTAL DISORDER: ICD-10-CM

## 2023-10-04 DIAGNOSIS — F99 INSOMNIA DUE TO OTHER MENTAL DISORDER: ICD-10-CM

## 2023-10-04 DIAGNOSIS — Z12.11 SCREEN FOR COLON CANCER: ICD-10-CM

## 2023-10-04 DIAGNOSIS — F41.1 GENERALIZED ANXIETY DISORDER: Primary | ICD-10-CM

## 2023-10-04 PROCEDURE — 99213 OFFICE O/P EST LOW 20 MIN: CPT | Performed by: FAMILY MEDICINE

## 2023-10-04 RX ORDER — ERGOCALCIFEROL (VITAMIN D2) 10 MCG
TABLET ORAL
COMMUNITY

## 2023-10-04 RX ORDER — BUSPIRONE HYDROCHLORIDE 10 MG/1
1 TABLET ORAL
COMMUNITY
Start: 2023-09-07 | End: 2023-10-04

## 2023-10-04 RX ORDER — BUSPIRONE HYDROCHLORIDE 5 MG/1
5 TABLET ORAL 2 TIMES DAILY
COMMUNITY
End: 2023-12-13

## 2023-10-04 NOTE — ASSESSMENT & PLAN NOTE
The patient takes Ambien 5 mg every other night and does find it to be effective when she takes it.

## 2023-10-04 NOTE — ASSESSMENT & PLAN NOTE
The patient is currently taking BuSpar 5 mg twice daily and will be increasing to the dose of 10 mg twice daily in the near future.  She is not yet sure if it is providing a lot of benefit.  The patient takes Xanax as needed for more severe anxiety.  The patient continues to have high levels of stress as it relates to proceeding through her divorce as well as concerns regarding her daughter.

## 2023-10-04 NOTE — PROGRESS NOTES
Assessment & Plan   Problem List Items Addressed This Visit          Behavioral    Generalized anxiety disorder - Primary     The patient is currently taking BuSpar 5 mg twice daily and will be increasing to the dose of 10 mg twice daily in the near future.  She is not yet sure if it is providing a lot of benefit.  The patient takes Xanax as needed for more severe anxiety.  The patient continues to have high levels of stress as it relates to proceeding through her divorce as well as concerns regarding her daughter.         Relevant Medications    busPIRone (BUSPAR) 5 MG tablet       Other    Insomnia     The patient takes Ambien 5 mg every other night and does find it to be effective when she takes it.          Other Visit Diagnoses       Screen for colon cancer        Relevant Orders    Colonoscopy Screening  Referral               SIRIA JACKSON MD  [facility]    Mely Bloom is a 43 year old who presents today for a medication check visit.  The patient continues to have high levels of stress related to her divorce.  She has joined a support group and finds that to be a good resource as well as relying on her friends for support.  Her daughter has been having some mental health issues which have also been contributing to higher levels of anxiety.  The patient continues to find her Xanax helpful.  She is gradually increasing the amount of BuSpar that she takes starting with the lowest dose of 5 mg twice daily and soon she plans to increase it to 10 mg twice daily.  She is tolerating it all right but is not confident yet whether or not it is providing good relief.  She continues to take Ambien every other night for sleep.  She is not sure if she will have health insurance at the beginning of the new year and so would like to get her colonoscopy and before the end of the year.  She also plans to schedule mammogram at [facility] in the near future.  Med  check        10/4/2023     8:59 AM   Additional Questions   Roomed by as   Accompanied by self         10/4/2023     8:59 AM   Patient Reported Additional Medications   Patient reports taking the following new medications no       History of Present Illness       Reason for visit:  Iching    She eats 0-1 servings of fruits and vegetables daily.She consumes 1 sweetened beverage(s) daily.She exercises with enough effort to increase her heart rate 30 to 60 minutes per day.  She exercises with enough effort to increase her heart rate 5 days per week.   She is taking medications regularly.             Objective    /80 (BP Location: Left arm, Patient Position: Left side, Cuff Size: Adult Large)   Pulse 78   Temp 98.1  F (36.7  C) (Oral)   Resp 18   SpO2 99%   There is no height or weight on file to calculate BMI.  Physical Exam   GENERAL: healthy, alert and no distress  PSYCH: mentation appears normal, affect normal/bright, tearful, and speech pressured

## 2023-10-11 DIAGNOSIS — F41.1 GENERALIZED ANXIETY DISORDER: ICD-10-CM

## 2023-10-11 RX ORDER — ALPRAZOLAM 1 MG
TABLET ORAL
Qty: 34 TABLET | Refills: 0 | Status: SHIPPED | OUTPATIENT
Start: 2023-10-11 | End: 2023-11-06

## 2023-10-30 ENCOUNTER — E-VISIT (OUTPATIENT)
Dept: FAMILY MEDICINE | Facility: CLINIC | Age: 43
End: 2023-10-30
Payer: COMMERCIAL

## 2023-10-30 DIAGNOSIS — J01.90 ACUTE SINUSITIS WITH SYMPTOMS > 10 DAYS: Primary | ICD-10-CM

## 2023-10-30 PROCEDURE — 99421 OL DIG E/M SVC 5-10 MIN: CPT | Performed by: FAMILY MEDICINE

## 2023-11-04 ENCOUNTER — E-VISIT (OUTPATIENT)
Dept: FAMILY MEDICINE | Facility: CLINIC | Age: 43
End: 2023-11-04
Payer: COMMERCIAL

## 2023-11-04 DIAGNOSIS — B37.31 CANDIDAL VULVOVAGINITIS: Primary | ICD-10-CM

## 2023-11-04 PROCEDURE — 99421 OL DIG E/M SVC 5-10 MIN: CPT | Performed by: FAMILY MEDICINE

## 2023-11-04 RX ORDER — FLUCONAZOLE 150 MG/1
150 TABLET ORAL ONCE
Qty: 1 TABLET | Refills: 0 | Status: SHIPPED | OUTPATIENT
Start: 2023-11-04 | End: 2023-11-06

## 2023-11-04 NOTE — PATIENT INSTRUCTIONS
Thank you for choosing us for your care. I have placed an order for a prescription so that you can start treatment. View your full visit summary for details by clicking on the link below. Your pharmacist will able to address any questions you may have about the medication.     If you re not feeling better within 2-3 days, please schedule an appointment.  You can schedule an appointment right here in [facility], or call [phone]  If the visit is for the same symptoms as your eVisit, we ll refund the cost of your eVisit if seen within seven days.    Thank you for choosing us for your care. Given your symptoms, I would like you to do a lab-only visit to determine what is causing them.  I have placed the orders.  Please schedule an appointment with the lab right here in [facility], or call [phone].  I will let you know when the results are back and next steps to take.

## 2023-11-06 DIAGNOSIS — F51.01 PRIMARY INSOMNIA: ICD-10-CM

## 2023-11-06 DIAGNOSIS — F41.1 GENERALIZED ANXIETY DISORDER: ICD-10-CM

## 2023-11-06 DIAGNOSIS — B37.31 CANDIDAL VULVOVAGINITIS: ICD-10-CM

## 2023-11-06 RX ORDER — ZOLPIDEM TARTRATE 5 MG/1
5 TABLET ORAL
Qty: 15 TABLET | Refills: 0 | Status: SHIPPED | OUTPATIENT
Start: 2023-11-06 | End: 2023-12-03

## 2023-11-06 RX ORDER — ALPRAZOLAM 1 MG
TABLET ORAL
Qty: 34 TABLET | Refills: 0 | Status: SHIPPED | OUTPATIENT
Start: 2023-11-06 | End: 2023-12-03

## 2023-11-06 RX ORDER — FLUCONAZOLE 150 MG/1
TABLET ORAL
Qty: 1 TABLET | Refills: 0 | Status: SHIPPED | OUTPATIENT
Start: 2023-11-06 | End: 2023-12-13

## 2023-11-16 ENCOUNTER — TRANSFERRED RECORDS (OUTPATIENT)
Dept: HEALTH INFORMATION MANAGEMENT | Facility: CLINIC | Age: 43
End: 2023-11-16
Payer: COMMERCIAL

## 2023-11-21 ENCOUNTER — LAB (OUTPATIENT)
Dept: LAB | Facility: CLINIC | Age: 43
End: 2023-11-21
Payer: COMMERCIAL

## 2023-11-21 DIAGNOSIS — B37.31 CANDIDAL VULVOVAGINITIS: ICD-10-CM

## 2023-11-21 LAB
CLUE CELLS: ABNORMAL
TRICHOMONAS, WET PREP: ABNORMAL
WBC'S/HIGH POWER FIELD, WET PREP: ABNORMAL
YEAST, WET PREP: ABNORMAL

## 2023-11-21 PROCEDURE — 87210 SMEAR WET MOUNT SALINE/INK: CPT

## 2023-11-28 ENCOUNTER — MYC MEDICAL ADVICE (OUTPATIENT)
Dept: FAMILY MEDICINE | Facility: CLINIC | Age: 43
End: 2023-11-28
Payer: COMMERCIAL

## 2023-11-29 NOTE — TELEPHONE ENCOUNTER
Forms sent to scanning, and a copy of results have been placed in  mailbox for review for appointment on 12/13/23

## 2023-12-02 DIAGNOSIS — F51.01 PRIMARY INSOMNIA: ICD-10-CM

## 2023-12-02 DIAGNOSIS — F41.1 GENERALIZED ANXIETY DISORDER: ICD-10-CM

## 2023-12-03 RX ORDER — ZOLPIDEM TARTRATE 5 MG/1
5 TABLET ORAL
Qty: 15 TABLET | Refills: 0 | Status: SHIPPED | OUTPATIENT
Start: 2023-12-03 | End: 2024-01-04

## 2023-12-03 RX ORDER — ALPRAZOLAM 1 MG
TABLET ORAL
Qty: 34 TABLET | Refills: 0 | Status: SHIPPED | OUTPATIENT
Start: 2023-12-03 | End: 2024-01-03

## 2023-12-13 ENCOUNTER — VIRTUAL VISIT (OUTPATIENT)
Dept: FAMILY MEDICINE | Facility: CLINIC | Age: 43
End: 2023-12-13
Payer: COMMERCIAL

## 2023-12-13 DIAGNOSIS — F41.1 GENERALIZED ANXIETY DISORDER: ICD-10-CM

## 2023-12-13 DIAGNOSIS — F90.2 ADHD (ATTENTION DEFICIT HYPERACTIVITY DISORDER), COMBINED TYPE: Primary | ICD-10-CM

## 2023-12-13 PROBLEM — B96.89 BACTERIAL VAGINOSIS: Status: RESOLVED | Noted: 2023-09-22 | Resolved: 2023-12-13

## 2023-12-13 PROBLEM — N76.0 BACTERIAL VAGINOSIS: Status: RESOLVED | Noted: 2023-09-22 | Resolved: 2023-12-13

## 2023-12-13 PROCEDURE — 99213 OFFICE O/P EST LOW 20 MIN: CPT | Mod: VID | Performed by: FAMILY MEDICINE

## 2023-12-13 RX ORDER — DEXTROAMPHETAMINE SACCHARATE, AMPHETAMINE ASPARTATE MONOHYDRATE, DEXTROAMPHETAMINE SULFATE AND AMPHETAMINE SULFATE 5; 5; 5; 5 MG/1; MG/1; MG/1; MG/1
CAPSULE, EXTENDED RELEASE ORAL
COMMUNITY
Start: 2023-12-13 | End: 2024-02-15 | Stop reason: DRUGHIGH

## 2023-12-13 NOTE — ASSESSMENT & PLAN NOTE
The patient was newly diagnosed with ADHD and started on Adderall XR 20 mg daily.  She reports a significant improvement in her ability to focus and complete tasks during the day.  She is also quite pleased with how it also seems to help her manage her anxiety.  She reports that the psychiatrist currently prescribing expressed that she will be transferring that medication to her primary care provider in the near future.  I am happy to take on prescribing that medicine for her when she is discharged from the care of the psychiatrist.

## 2023-12-13 NOTE — PROGRESS NOTES
"Merle is a 43 year old who is being evaluated via a billable video visit.      How would you like to obtain your AVS? MyChart  If the video visit is dropped, the invitation should be resent by: Text to cell phone: [phone]  Will anyone else be joining your video visit? No          Assessment & Plan   Problem List Items Addressed This Visit       Generalized anxiety disorder     Again, the patient seems to be having good symptom control of her anxiety on Adderall.  However, she does find that as the medication wears off in the late afternoon the anxiety comes back.  She is currently taking 1 Xanax tablet daily.         ADHD (attention deficit hyperactivity disorder), combined type - Primary     The patient was newly diagnosed with ADHD and started on Adderall XR 20 mg daily.  She reports a significant improvement in her ability to focus and complete tasks during the day.  She is also quite pleased with how it also seems to help her manage her anxiety.  She reports that the psychiatrist currently prescribing expressed that she will be transferring that medication to her primary care provider in the near future.  I am happy to take on prescribing that medicine for her when she is discharged from the care of the psychiatrist.         Relevant Medications    amphetamine-dextroamphetamine (ADDERALL XR) 20 MG 24 hr capsule           BMI:   Estimated body mass index is 25.7 kg/m  as calculated from the following:    Height as of 9/22/23: 1.702 m (5' 7\").    Weight as of 9/22/23: 74.4 kg (164 lb 1.6 oz).         SIRIA JACKSON MD  [facility]   Merle is a 43 year old who presents today for medication check visit.  The patient was advised to connect with me regarding a new diagnosis of ADHD and was started on Adderall a little over a month ago.  She feels that the medication is very effective and is tolerating it well.  She actually finds that she feels much less anxiety " during the day when taking the medicine.  It has not interfered with sleep at night at all.  She no longer finds that she needs to take any Xanax in the morning as she feels quite calm at that time.  However, the anxiety starts to build in the late afternoon when the medication is wearing off.  Med check      History of Present Illness       Reason for visit:  Adding new medication.She consumes 1 sweetened beverage(s) daily.She exercises with enough effort to increase her heart rate 20 to 29 minutes per day.  She exercises with enough effort to increase her heart rate 3 or less days per week.   She is taking medications regularly.           Objective           Vitals:  No vitals were obtained today due to virtual visit.    Physical Exam   GENERAL: Healthy, alert and no distress  NEURO: Cranial nerves grossly intact.  Mentation and speech appropriate for age.  PSYCH: Mentation appears normal, affect normal/bright, judgement and insight intact, normal speech and appearance well-groomed.            Video-Visit Details    Type of service:  Video Visit       Originating Location (pt. Location): Home    Distant Location (provider location):  On-site  Platform used for Video Visit: [employer]

## 2023-12-13 NOTE — COMMUNITY RESOURCES LIST (ENGLISH)
12/13/2023   [facility] [email or web address]  N/A  For questions about this resource list or additional care needs, please contact your primary care clinic or care manager.  Phone: [phone]   Email: N/A   Address: [facility] [place] [address]   Hours: N/A        Financial Stability       Rent and mortgage payment assistance  1  [place] [facility] Distance: 2.05 miles      [address] [place] [address]  Language: English, Monegasque  Hours: Mon 9:30 AM - 11:30 AM , Tue 1:30 PM - 7:30 PM , Thu 9:00 AM - 7:00 PM , Fri 9:30 AM - 11:30 AM   Phone: (267) 344-8527 Email: info@[facility].[employer] Website: http://[facility].NBO TV/     2  [facility] [place] Distance: 5.42 miles      In-Person, Phone/Virtual   [address] [place] [address]  Language: English, Monegasque  Hours: Mon - Thu 9:00 AM - 4:00 PM  Fees: Free   Phone: (805) 623-3758 Email: center@[facility] Website: https://www.saintandrews.NBO TV/community-resource-center/          Important Numbers & Websites       Emergency Services   911  City Services   311  Poison Control   (182) 348-9172  Suicide Prevention Lifeline   (435) 631-2804 (TALK)  Child Abuse Hotline   (716) 369-2647 (4-A-Child)  Sexual Assault Hotline   (566) 530-5294 (HOPE)  National Runaway Safeline   (751) 712-3688 (RUNAWAY)  All-Options Talkline   (918) 960-8757  Substance Abuse Referral   (404) 167-4211 (HELP)

## 2023-12-13 NOTE — COMMUNITY RESOURCES LIST (ENGLISH)
12/13/2023   [facility] [employer]  N/A  For questions about this resource list or additional care needs, please contact your primary care clinic or care manager.  Phone: [phone]   Email: N/A   Address: [facility] [place] [address]   Hours: N/A        Financial Stability       Rent and mortgage payment assistance  1  [place] Outreach Distance: 2.05 miles      [address] [place] [address]  Language: English, [place] citizen  Hours: Mon 9:30 AM - 11:30 AM , Tue 1:30 PM - 7:30 PM , Thu 9:00 AM - 7:00 PM , Fri 9:30 AM - 11:30 AM   Phone: (647) 827-4577 Email: info@[facility].Vilynx Website: http://[facility].Vilynx/     2  [facility] [place] Distance: 5.42 miles      In-Person, Phone/Virtual   [address] [place] [address]  Language: English, [place] citizen  Hours: Mon - Thu 9:00 AM - 4:00 PM  Fees: Free   Phone: (717) 572-8105 Email: center@[facility]andrews[employer]is Website: https://www.saintandrews.Vilynx/community-resource-center/          Important Numbers & Websites       Emergency Services   911  City Services   311  Poison Control   (838) 886-6131  Suicide Prevention Lifeline   (243) 839-3143 (TALK)  Child Abuse Hotline   (637) 435-2532 (4-A-Child)  Sexual Assault Hotline   (462) 423-3800 (HOPE)  National Runaway Safeline   (834) 431-4663 (RUNAWAY)  All-Options Talkline   (597) 663-1888  Substance Abuse Referral   (174) 257-2838 (HELP)

## 2023-12-13 NOTE — ASSESSMENT & PLAN NOTE
Again, the patient seems to be having good symptom control of her anxiety on Adderall.  However, she does find that as the medication wears off in the late afternoon the anxiety comes back.  She is currently taking 1 Xanax tablet daily.

## 2023-12-13 NOTE — COMMUNITY RESOURCES LIST (ENGLISH)
12/13/2023   [facility] [employer]  N/A  For questions about this resource list or additional care needs, please contact your primary care clinic or care manager.  Phone: [phone]   Email: N/A   Address: [facility] [place] [address]   Hours: N/A        Financial Stability       Rent and mortgage payment assistance  1  [place] Outreach Distance: 2.05 miles      [address] [place] [address]  Language: English, [place]  Hours: Mon 9:30 AM - 11:30 AM , Tue 1:30 PM - 7:30 PM , Thu 9:00 AM - 7:00 PM , Fri 9:30 AM - 11:30 AM   Phone: (817) 551-1162 Email: info@[facility].[employer] Website: http://[facility].Rollerscoot/     2  [facility] Resource [place] Distance: 5.42 miles      In-Person, Phone/Virtual   [address] [place] [address]  Language: English, [place]  Hours: Mon - Thu 9:00 AM - 4:00 PM  Fees: Free   Phone: (306) 819-7831 Email: center@[facility]sYourPOV.TV Website: https://www.saintandrews.Rollerscoot/community-resource-center/          Important Numbers & Websites       Emergency Services   911  City Services   311  Poison Control   (342) 471-8723  Suicide Prevention Lifeline   (520) 608-9155 (TALK)  Child Abuse Hotline   (175) 360-4514 (4-A-Child)  Sexual Assault Hotline   (139) 764-9951 (HOPE)  National Runaway Safeline   (950) 278-1927 (RUNAWAY)  All-Options Talkline   (113) 321-6829  Substance Abuse Referral   (715) 329-7345 (HELP)

## 2024-01-02 DIAGNOSIS — F41.1 GENERALIZED ANXIETY DISORDER: ICD-10-CM

## 2024-01-03 RX ORDER — ALPRAZOLAM 1 MG
TABLET ORAL
Qty: 34 TABLET | Refills: 0 | Status: SHIPPED | OUTPATIENT
Start: 2024-01-03 | End: 2024-01-31

## 2024-01-04 DIAGNOSIS — F51.01 PRIMARY INSOMNIA: ICD-10-CM

## 2024-01-04 RX ORDER — ZOLPIDEM TARTRATE 5 MG/1
5 TABLET ORAL
Qty: 15 TABLET | Refills: 0 | Status: SHIPPED | OUTPATIENT
Start: 2024-01-04 | End: 2024-01-29

## 2024-01-28 DIAGNOSIS — F51.01 PRIMARY INSOMNIA: ICD-10-CM

## 2024-01-29 RX ORDER — ZOLPIDEM TARTRATE 5 MG/1
5 TABLET ORAL
Qty: 15 TABLET | Refills: 0 | Status: SHIPPED | OUTPATIENT
Start: 2024-01-29 | End: 2024-03-01

## 2024-01-31 DIAGNOSIS — F41.1 GENERALIZED ANXIETY DISORDER: ICD-10-CM

## 2024-01-31 RX ORDER — ALPRAZOLAM 1 MG
TABLET ORAL
Qty: 34 TABLET | Refills: 0 | Status: SHIPPED | OUTPATIENT
Start: 2024-01-31 | End: 2024-03-01

## 2024-02-29 DIAGNOSIS — F41.1 GENERALIZED ANXIETY DISORDER: ICD-10-CM

## 2024-03-01 DIAGNOSIS — F51.01 PRIMARY INSOMNIA: ICD-10-CM

## 2024-03-01 RX ORDER — ZOLPIDEM TARTRATE 5 MG/1
5 TABLET ORAL
Qty: 15 TABLET | Refills: 0 | Status: SHIPPED | OUTPATIENT
Start: 2024-03-01 | End: 2024-03-30

## 2024-03-01 RX ORDER — ALPRAZOLAM 1 MG
TABLET ORAL
Qty: 34 TABLET | Refills: 0 | Status: SHIPPED | OUTPATIENT
Start: 2024-03-01 | End: 2024-03-30

## 2024-03-29 ENCOUNTER — MYC MEDICAL ADVICE (OUTPATIENT)
Dept: FAMILY MEDICINE | Facility: CLINIC | Age: 44
End: 2024-03-29
Payer: COMMERCIAL

## 2024-03-29 DIAGNOSIS — F51.01 PRIMARY INSOMNIA: ICD-10-CM

## 2024-03-29 DIAGNOSIS — F41.1 GENERALIZED ANXIETY DISORDER: ICD-10-CM

## 2024-03-29 DIAGNOSIS — F90.2 ADHD (ATTENTION DEFICIT HYPERACTIVITY DISORDER), COMBINED TYPE: Primary | ICD-10-CM

## 2024-03-29 NOTE — TELEPHONE ENCOUNTER
Per [employer] message dated 2/15/24, patient transferring rx's to Dr. Fernandez from Marry Rodriguez, APRN, CNP, AGNP-BC, PMHNP-BC.

## 2024-03-30 RX ORDER — DEXTROAMPHETAMINE SACCHARATE, AMPHETAMINE ASPARTATE MONOHYDRATE, DEXTROAMPHETAMINE SULFATE AND AMPHETAMINE SULFATE 7.5; 7.5; 7.5; 7.5 MG/1; MG/1; MG/1; MG/1
30 CAPSULE, EXTENDED RELEASE ORAL EVERY MORNING
Qty: 30 CAPSULE | Refills: 0 | Status: SHIPPED | OUTPATIENT
Start: 2024-03-30 | End: 2024-04-29

## 2024-03-30 RX ORDER — ALPRAZOLAM 1 MG
TABLET ORAL
Qty: 34 TABLET | Refills: 0 | Status: SHIPPED | OUTPATIENT
Start: 2024-03-30 | End: 2024-04-29

## 2024-03-30 RX ORDER — ZOLPIDEM TARTRATE 5 MG/1
TABLET ORAL
Qty: 15 TABLET | Refills: 0 | Status: SHIPPED | OUTPATIENT
Start: 2024-03-30 | End: 2024-04-15

## 2024-04-03 ENCOUNTER — MYC REFILL (OUTPATIENT)
Dept: FAMILY MEDICINE | Facility: CLINIC | Age: 44
End: 2024-04-03
Payer: COMMERCIAL

## 2024-04-03 ENCOUNTER — PATIENT OUTREACH (OUTPATIENT)
Dept: CARE COORDINATION | Facility: CLINIC | Age: 44
End: 2024-04-03
Payer: COMMERCIAL

## 2024-04-03 DIAGNOSIS — F90.2 ADHD (ATTENTION DEFICIT HYPERACTIVITY DISORDER), COMBINED TYPE: Primary | ICD-10-CM

## 2024-04-03 RX ORDER — DEXTROAMPHETAMINE SACCHARATE, AMPHETAMINE ASPARTATE MONOHYDRATE, DEXTROAMPHETAMINE SULFATE AND AMPHETAMINE SULFATE 3.75; 3.75; 3.75; 3.75 MG/1; MG/1; MG/1; MG/1
15 CAPSULE, EXTENDED RELEASE ORAL DAILY
OUTPATIENT
Start: 2024-04-03

## 2024-04-09 RX ORDER — DEXTROAMPHETAMINE SACCHARATE, AMPHETAMINE ASPARTATE MONOHYDRATE, DEXTROAMPHETAMINE SULFATE AND AMPHETAMINE SULFATE 3.75; 3.75; 3.75; 3.75 MG/1; MG/1; MG/1; MG/1
15 CAPSULE, EXTENDED RELEASE ORAL DAILY
Qty: 30 CAPSULE | Refills: 0 | Status: SHIPPED | OUTPATIENT
Start: 2024-04-09 | End: 2024-04-14

## 2024-04-09 NOTE — TELEPHONE ENCOUNTER
Pt states that she takes one 30mg in the morning and then takes one 15MG in the afternoon.  She has her 30mg still but needs a refill of the 15MG.    Please advise.

## 2024-04-14 ENCOUNTER — MYC REFILL (OUTPATIENT)
Dept: FAMILY MEDICINE | Facility: CLINIC | Age: 44
End: 2024-04-14
Payer: COMMERCIAL

## 2024-04-14 DIAGNOSIS — F51.01 PRIMARY INSOMNIA: ICD-10-CM

## 2024-04-14 DIAGNOSIS — F90.2 ADHD (ATTENTION DEFICIT HYPERACTIVITY DISORDER), COMBINED TYPE: ICD-10-CM

## 2024-04-15 RX ORDER — DEXTROAMPHETAMINE SACCHARATE, AMPHETAMINE ASPARTATE MONOHYDRATE, DEXTROAMPHETAMINE SULFATE AND AMPHETAMINE SULFATE 3.75; 3.75; 3.75; 3.75 MG/1; MG/1; MG/1; MG/1
15 CAPSULE, EXTENDED RELEASE ORAL DAILY
Qty: 30 CAPSULE | Refills: 0 | Status: SHIPPED | OUTPATIENT
Start: 2024-04-15 | End: 2024-05-30

## 2024-04-15 RX ORDER — ZOLPIDEM TARTRATE 5 MG/1
TABLET ORAL
Qty: 15 TABLET | Refills: 0 | Status: SHIPPED | OUTPATIENT
Start: 2024-04-15 | End: 2024-05-20

## 2024-04-17 ENCOUNTER — PATIENT OUTREACH (OUTPATIENT)
Dept: CARE COORDINATION | Facility: CLINIC | Age: 44
End: 2024-04-17
Payer: COMMERCIAL

## 2024-04-29 ENCOUNTER — MYC REFILL (OUTPATIENT)
Dept: FAMILY MEDICINE | Facility: CLINIC | Age: 44
End: 2024-04-29
Payer: COMMERCIAL

## 2024-04-29 DIAGNOSIS — F90.2 ADHD (ATTENTION DEFICIT HYPERACTIVITY DISORDER), COMBINED TYPE: ICD-10-CM

## 2024-04-29 DIAGNOSIS — F41.1 GENERALIZED ANXIETY DISORDER: ICD-10-CM

## 2024-04-29 RX ORDER — DEXTROAMPHETAMINE SACCHARATE, AMPHETAMINE ASPARTATE MONOHYDRATE, DEXTROAMPHETAMINE SULFATE AND AMPHETAMINE SULFATE 7.5; 7.5; 7.5; 7.5 MG/1; MG/1; MG/1; MG/1
30 CAPSULE, EXTENDED RELEASE ORAL EVERY MORNING
Qty: 30 CAPSULE | Refills: 0 | Status: SHIPPED | OUTPATIENT
Start: 2024-04-29 | End: 2024-05-30

## 2024-04-29 RX ORDER — ALPRAZOLAM 1 MG
TABLET ORAL
Qty: 34 TABLET | Refills: 0 | Status: SHIPPED | OUTPATIENT
Start: 2024-04-29 | End: 2024-05-24

## 2024-05-18 DIAGNOSIS — F51.01 PRIMARY INSOMNIA: ICD-10-CM

## 2024-05-20 RX ORDER — ZOLPIDEM TARTRATE 5 MG/1
TABLET ORAL
Qty: 15 TABLET | Refills: 0 | Status: SHIPPED | OUTPATIENT
Start: 2024-05-20 | End: 2024-06-22

## 2024-05-24 DIAGNOSIS — F41.1 GENERALIZED ANXIETY DISORDER: ICD-10-CM

## 2024-05-24 RX ORDER — ALPRAZOLAM 1 MG
TABLET ORAL
Qty: 34 TABLET | Refills: 0 | Status: SHIPPED | OUTPATIENT
Start: 2024-05-24 | End: 2024-06-22

## 2024-05-30 ENCOUNTER — MYC REFILL (OUTPATIENT)
Dept: FAMILY MEDICINE | Facility: CLINIC | Age: 44
End: 2024-05-30
Payer: COMMERCIAL

## 2024-05-30 DIAGNOSIS — F90.2 ADHD (ATTENTION DEFICIT HYPERACTIVITY DISORDER), COMBINED TYPE: ICD-10-CM

## 2024-05-31 RX ORDER — DEXTROAMPHETAMINE SACCHARATE, AMPHETAMINE ASPARTATE MONOHYDRATE, DEXTROAMPHETAMINE SULFATE AND AMPHETAMINE SULFATE 7.5; 7.5; 7.5; 7.5 MG/1; MG/1; MG/1; MG/1
30 CAPSULE, EXTENDED RELEASE ORAL EVERY MORNING
Qty: 30 CAPSULE | Refills: 0 | Status: SHIPPED | OUTPATIENT
Start: 2024-05-31 | End: 2024-07-01

## 2024-05-31 RX ORDER — DEXTROAMPHETAMINE SACCHARATE, AMPHETAMINE ASPARTATE MONOHYDRATE, DEXTROAMPHETAMINE SULFATE AND AMPHETAMINE SULFATE 3.75; 3.75; 3.75; 3.75 MG/1; MG/1; MG/1; MG/1
15 CAPSULE, EXTENDED RELEASE ORAL DAILY
Qty: 30 CAPSULE | Refills: 0 | Status: SHIPPED | OUTPATIENT
Start: 2024-05-31 | End: 2024-07-01

## 2024-06-21 DIAGNOSIS — F41.1 GENERALIZED ANXIETY DISORDER: ICD-10-CM

## 2024-06-21 DIAGNOSIS — F51.01 PRIMARY INSOMNIA: ICD-10-CM

## 2024-06-22 RX ORDER — ALPRAZOLAM 1 MG
TABLET ORAL
Qty: 34 TABLET | Refills: 0 | Status: SHIPPED | OUTPATIENT
Start: 2024-06-22 | End: 2024-07-21

## 2024-06-22 RX ORDER — ZOLPIDEM TARTRATE 5 MG/1
TABLET ORAL
Qty: 15 TABLET | Refills: 0 | Status: SHIPPED | OUTPATIENT
Start: 2024-06-22 | End: 2024-07-21

## 2024-06-24 DIAGNOSIS — F41.1 GENERALIZED ANXIETY DISORDER: ICD-10-CM

## 2024-06-25 RX ORDER — ALPRAZOLAM 1 MG
TABLET ORAL
Qty: 34 TABLET | Refills: 0 | OUTPATIENT
Start: 2024-06-25

## 2024-07-01 ENCOUNTER — MYC REFILL (OUTPATIENT)
Dept: FAMILY MEDICINE | Facility: CLINIC | Age: 44
End: 2024-07-01
Payer: COMMERCIAL

## 2024-07-01 DIAGNOSIS — F90.2 ADHD (ATTENTION DEFICIT HYPERACTIVITY DISORDER), COMBINED TYPE: ICD-10-CM

## 2024-07-01 RX ORDER — DEXTROAMPHETAMINE SACCHARATE, AMPHETAMINE ASPARTATE MONOHYDRATE, DEXTROAMPHETAMINE SULFATE AND AMPHETAMINE SULFATE 3.75; 3.75; 3.75; 3.75 MG/1; MG/1; MG/1; MG/1
15 CAPSULE, EXTENDED RELEASE ORAL DAILY
Qty: 30 CAPSULE | Refills: 0 | Status: SHIPPED | OUTPATIENT
Start: 2024-07-01 | End: 2024-07-22

## 2024-07-01 RX ORDER — DEXTROAMPHETAMINE SACCHARATE, AMPHETAMINE ASPARTATE MONOHYDRATE, DEXTROAMPHETAMINE SULFATE AND AMPHETAMINE SULFATE 7.5; 7.5; 7.5; 7.5 MG/1; MG/1; MG/1; MG/1
30 CAPSULE, EXTENDED RELEASE ORAL EVERY MORNING
Qty: 30 CAPSULE | Refills: 0 | Status: SHIPPED | OUTPATIENT
Start: 2024-07-01 | End: 2024-08-19

## 2024-07-03 ENCOUNTER — TELEPHONE (OUTPATIENT)
Dept: FAMILY MEDICINE | Facility: CLINIC | Age: 44
End: 2024-07-03
Payer: COMMERCIAL

## 2024-07-03 NOTE — TELEPHONE ENCOUNTER
Patient Quality Outreach    Patient is due for the following:   Physical Preventive Adult Physical    Next Steps:   Schedule a Adult Preventative    Type of outreach:    Sent [employer] message.      Questions for provider review:    None           Maddi [place] MA  Chart routed to Maddi.    Pt needs a med check re adderall and a physical if she wants to do both at same time but for sure med check. Please help schedule.

## 2024-07-18 ENCOUNTER — OFFICE VISIT (OUTPATIENT)
Dept: FAMILY MEDICINE | Facility: CLINIC | Age: 44
End: 2024-07-18
Payer: COMMERCIAL

## 2024-07-18 VITALS
WEIGHT: 152 LBS | HEART RATE: 82 BPM | RESPIRATION RATE: 16 BRPM | TEMPERATURE: 98.1 F | OXYGEN SATURATION: 100 % | HEIGHT: 67 IN | BODY MASS INDEX: 23.86 KG/M2 | SYSTOLIC BLOOD PRESSURE: 135 MMHG | DIASTOLIC BLOOD PRESSURE: 85 MMHG

## 2024-07-18 DIAGNOSIS — F41.1 GENERALIZED ANXIETY DISORDER: ICD-10-CM

## 2024-07-18 DIAGNOSIS — Z00.00 ROUTINE GENERAL MEDICAL EXAMINATION AT A HEALTH CARE FACILITY: Primary | ICD-10-CM

## 2024-07-18 DIAGNOSIS — F43.22 ADJUSTMENT DISORDER WITH ANXIOUS MOOD: ICD-10-CM

## 2024-07-18 DIAGNOSIS — E78.49 OTHER HYPERLIPIDEMIA: ICD-10-CM

## 2024-07-18 DIAGNOSIS — F90.2 ADHD (ATTENTION DEFICIT HYPERACTIVITY DISORDER), COMBINED TYPE: ICD-10-CM

## 2024-07-18 PROCEDURE — 99213 OFFICE O/P EST LOW 20 MIN: CPT | Mod: 25 | Performed by: FAMILY MEDICINE

## 2024-07-18 PROCEDURE — 99396 PREV VISIT EST AGE 40-64: CPT | Performed by: FAMILY MEDICINE

## 2024-07-18 RX ORDER — VENLAFAXINE HYDROCHLORIDE 37.5 MG/1
37.5 TABLET, EXTENDED RELEASE ORAL DAILY
Qty: 30 TABLET | Refills: 1 | Status: SHIPPED | OUTPATIENT
Start: 2024-07-18 | End: 2024-07-22

## 2024-07-18 ASSESSMENT — PATIENT HEALTH QUESTIONNAIRE - PHQ9
SUM OF ALL RESPONSES TO PHQ QUESTIONS 1-9: 18
10. IF YOU CHECKED OFF ANY PROBLEMS, HOW DIFFICULT HAVE THESE PROBLEMS MADE IT FOR YOU TO DO YOUR WORK, TAKE CARE OF THINGS AT HOME, OR GET ALONG WITH OTHER PEOPLE: EXTREMELY DIFFICULT
SUM OF ALL RESPONSES TO PHQ QUESTIONS 1-9: 18

## 2024-07-18 ASSESSMENT — SOCIAL DETERMINANTS OF HEALTH (SDOH): HOW OFTEN DO YOU GET TOGETHER WITH FRIENDS OR RELATIVES?: ONCE A WEEK

## 2024-07-18 NOTE — PATIENT INSTRUCTIONS
Patient Education   Preventive Care Advice   This is general advice given by our system to help you stay healthy. However, your care team may have specific advice just for you. Please talk to your care team about your preventive care needs.  Nutrition  Eat 5 or more servings of fruits and vegetables each day.  Try wheat bread, brown rice and whole grain pasta (instead of white bread, rice, and pasta).  Get enough calcium and vitamin D. Check the label on foods and aim for 100% of the RDA (recommended daily allowance).  Lifestyle  Exercise at least 150 minutes each week  (30 minutes a day, 5 days a week).  Do muscle strengthening activities 2 days a week. These help control your weight and prevent disease.  No smoking.  Wear sunscreen to prevent skin cancer.  Have a dental exam and cleaning every 6 months.  Yearly exams  See your health care team every year to talk about:  Any changes in your health.  Any medicines your care team has prescribed.  Preventive care, family planning, and ways to prevent chronic diseases.  Shots (vaccines)   HPV shots (up to age 26), if you've never had them before.  Hepatitis B shots (up to age 59), if you've never had them before.  COVID-19 shot: Get this shot when it's due.  Flu shot: Get a flu shot every year.  Tetanus shot: Get a tetanus shot every 10 years.  Pneumococcal, hepatitis A, and RSV shots: Ask your care team if you need these based on your risk.  Shingles shot (for age 50 and up)  General health tests  Diabetes screening:  Starting at age 35, Get screened for diabetes at least every 3 years.  If you are younger than age 35, ask your care team if you should be screened for diabetes.  Cholesterol test: At age 39, start having a cholesterol test every 5 years, or more often if advised.  Bone density scan (DEXA): At age 50, ask your care team if you should have this scan for osteoporosis (brittle bones).  Hepatitis C: Get tested at least once in your life.  STIs (sexually  transmitted infections)  Before age 24: Ask your care team if you should be screened for STIs.  After age 24: Get screened for STIs if you're at risk. You are at risk for STIs (including HIV) if:  You are sexually active with more than one person.  You don't use condoms every time.  You or a partner was diagnosed with a sexually transmitted infection.  If you are at risk for HIV, ask about PrEP medicine to prevent HIV.  Get tested for HIV at least once in your life, whether you are at risk for HIV or not.  Cancer screening tests  Cervical cancer screening: If you have a cervix, begin getting regular cervical cancer screening tests starting at age 21.  Breast cancer scan (mammogram): If you've ever had breasts, begin having regular mammograms starting at age 40. This is a scan to check for breast cancer.  Colon cancer screening: It is important to start screening for colon cancer at age 45.  Have a colonoscopy test every 10 years (or more often if you're at risk) Or, ask your provider about stool tests like a FIT test every year or Cologuard test every 3 years.  To learn more about your testing options, visit:   .  For help making a decision, visit:   https://bit.ly/jv73306.  Prostate cancer screening test: If you have a prostate, ask your care team if a prostate cancer screening test (PSA) at age 55 is right for you.  Lung cancer screening: If you are a current or former smoker ages 50 to 80, ask your care team if ongoing lung cancer screenings are right for you.  For informational purposes only. Not to replace the advice of your health care provider. Copyright   2023 [facility]. All rights reserved. Clinically reviewed by the [facility]. [employer] [id number] - REV 01/24.  Learning About Stress  What is stress?     Stress is your body's response to a hard situation. Your body can have a physical, emotional, or mental response. Stress is a fact of life for most people, and it  affects everyone differently. What causes stress for you may not be stressful for someone else.  A lot of things can cause stress. You may feel stress when you go on a job interview, take a test, or run a race. This kind of short-term stress is normal and even useful. It can help you if you need to work hard or react quickly. For example, stress can help you finish an important job on time.  Long-term stress is caused by ongoing stressful situations or events. Examples of long-term stress include long-term health problems, ongoing problems at work, or conflicts in your family. Long-term stress can harm your health.  How does stress affect your health?  When you are stressed, your body responds as though you are in danger. It makes hormones that speed up your heart, make you breathe faster, and give you a burst of energy. This is called the fight-or-flight stress response. If the stress is over quickly, your body goes back to normal and no harm is done.  But if stress happens too often or lasts too long, it can have bad effects. Long-term stress can make you more likely to get sick, and it can make symptoms of some diseases worse. If you tense up when you are stressed, you may develop neck, shoulder, or low back pain. Stress is linked to high blood pressure and heart disease.  Stress also harms your emotional health. It can make you [place], tense, or depressed. Your relationships may suffer, and you may not do well at work or school.  What can you do to manage stress?  You can try these things to help manage stress:   Do something active. Exercise or activity can help reduce stress. Walking is a great way to get started. Even everyday activities such as housecleaning or yard work can help.  Try yoga or tianna chi. These techniques combine exercise and meditation. You may need some training at first to learn them.  Do something you enjoy. For example, listen to music or go to a movie. Practice your hobby or do volunteer  "work.  Meditate. This can help you relax, because you are not worrying about what happened before or what may happen in the future.  Do guided imagery. Imagine yourself in any setting that helps you feel calm. You can use online videos, books, or a teacher to guide you.  Do breathing exercises. For example:  From a standing position, bend forward from the waist with your knees slightly bent. Let your arms dangle close to the floor.  Breathe in slowly and deeply as you return to a standing position. Roll up slowly and lift your head last.  Hold your breath for just a few seconds in the standing position.  Breathe out slowly and bend forward from the waist.  Let your feelings out. Talk, laugh, cry, and express anger when you need to. Talking with supportive friends or family, a counselor, or a angi leader about your feelings is a healthy way to relieve stress. Avoid discussing your feelings with people who make you feel worse.  Write. It may help to write about things that are bothering you. This helps you find out how much stress you feel and what is causing it. When you know this, you can find better ways to cope.  What can you do to prevent stress?  You might try some of these things to help prevent stress:  Manage your time. This helps you find time to do the things you want and need to do.  Get enough sleep. Your body recovers from the stresses of the day while you are sleeping.  Get support. Your family, friends, and community can make a difference in how you experience stress.  Limit your news feed. Avoid or limit time on social media or news that may make you feel stressed.  Do something active. Exercise or activity can help reduce stress. Walking is a great way to get started.  Where can you learn more?  Go to https://www.healthwise.net/patiented  Enter N032 in the search box to learn more about \"Learning About Stress.\"  Current as of: October 24, 2023               Content Version: 14.0    3256-6734 " [employer].   Care instructions adapted under license by your healthcare professional. If you have questions about a medical condition or this instruction, always ask your healthcare professional. [employer] disclaims any warranty or liability for your use of this information.      Learning About Depression Screening  What is depression screening?  Depression screening is a way to see if you have depression symptoms. It may be done by a doctor or counselor. It's often part of a routine checkup. That's because your mental health is just as important as your physical health.  Depression is a mental health condition that affects how you feel, think, and act. You may:  Have less energy.  Lose interest in your daily activities.  Feel sad and grouchy for a long time.  Depression is very common. It affects people of all ages.  Many things can lead to depression. Some people become depressed after they have a stroke or find out they have a major illness like cancer or heart disease. The death of a loved one or a breakup may lead to depression. It can run in families. Most experts believe that a combination of inherited genes and stressful life events can cause it.  What happens during screening?  You may be asked to fill out a form about your depression symptoms. You and the doctor will discuss your answers. The doctor may ask you more questions to learn more about how you think, act, and feel.  What happens after screening?  If you have symptoms of depression, your doctor will talk to you about your options.  Doctors usually treat depression with medicines or counseling. Often, combining the two works best. Many people don't get help because they think that they'll get over the depression on their own. But people with depression may not get better unless they get treatment.  The cause of depression is not well understood. There may be many factors involved. But if you have depression, it's not  "your fault.  A serious symptom of depression is thinking about death or suicide. If you or someone you care about talks about this or about feeling hopeless, get help right away.  It's important to know that depression can be treated. Medicine, counseling, and self-care may help.  Where can you learn more?  Go to https://www.[employer].net/patiented  Enter T185 in the search box to learn more about \"Learning About Depression Screening.\"  Current as of: June 24, 2023               Content Version: 14.0    8303-4506 [employer].   Care instructions adapted under license by your healthcare professional. If you have questions about a medical condition or this instruction, always ask your healthcare professional. [employer] disclaims any warranty or liability for your use of this information.         "

## 2024-07-18 NOTE — PROGRESS NOTES
Preventive Care Visit  [facility]  ISRIA JACKSON MD, Family Medicine  Jul 18, 2024      Assessment & Plan   Problem List Items Addressed This Visit       Generalized anxiety disorder     She was open to giving a trial to venlafaxine extended release 37.5 mg capsule today.  Prescription provided         Other hyperlipidemia    ADHD (attention deficit hyperactivity disorder), combined type     We had a good conversation regarding her current Adderall dosing.  She was seeing psychiatry last year and they came up on the current regimen of Adderall XR 30 mg in the morning and Adderall XR 15 mg in the early afternoon.  I expressed that I was concerned about that regimen and would feel more comfortable with the short acting 15 mg dose taken as needed in the afternoon when she needs to be able to focus into the afternoon.  A prescription for that was provided today with guidance to use up her 15 mg long-acting by taking 2 in the morning to equate to a 30 mg dose.  I asked her to follow-up in 3 months to make sure that this change is working well.         Relevant Medications    amphetamine-dextroamphetamine (ADDERALL) 15 MG tablet     Other Visit Diagnoses       Routine general medical examination at a health care facility    -  Primary    Adjustment disorder with anxious mood                      Depression Screening Follow Up        7/18/2024     2:23 PM   PHQ   PHQ-9 Total Score 18   Q9: Thoughts of better off dead/self-harm past 2 weeks Not at all         Counseling  Appropriate preventive services were addressed with this patient via screening, questionnaire, or discussion as appropriate for fall prevention, nutrition, physical activity, Tobacco-use cessation, weight loss and cognition.  Checklist reviewing preventive services available has been given to the patient.  Reviewed patient's diet, addressing concerns and/or questions.   The patient's PHQ-9 score is consistent with moderate  depression. She was provided with information regarding depression.         Subjective   Merle is a 44 year old, presenting for the following:  Physical        7/18/2024     2:25 PM   Additional Questions   Roomed by as   Accompanied by self         7/18/2024     2:25 PM   Patient Reported Additional Medications   Patient reports taking the following new medications no        Health Care Directive  Patient does not have a Health Care Directive or Living Will: Patient states has Advance Directive and will bring in a copy to clinic.          7/18/2024   General Health   How would you rate your overall physical health? (!) POOR   Feel stress (tense, anxious, or unable to sleep) Very much      (!) STRESS CONCERN      7/18/2024   Nutrition   Three or more servings of calcium each day? (!) NO   Diet: Regular (no restrictions)   How many servings of fruit and vegetables per day? (!) 0-1   How many sweetened beverages each day? 0-1            5/3/2023   Exercise   Frequency of exercise: 1 day/week            7/18/2024   Social Factors   Frequency of gathering with friends or relatives Once a week   Worry food won't last until get money to buy more Yes   Food not last or not have enough money for food? Yes   Do you have housing? (Housing is defined as stable permanent housing and does not include staying ouside in a car, in a tent, in an abandoned building, in an overnight shelter, or couch-surfing.) Yes   Are you worried about losing your housing? Yes   Lack of transportation? Yes   Unable to get utilities (heat,electricity)? Yes   Want help with housing or utility concern? No      (!) FOOD SECURITY CONCERN PRESENT (!) TRANSPORTATION CONCERN PRESENT(!) HOUSING CONCERN PRESENT(!) FINANCIAL RESOURCE STRAIN CONCERN      7/18/2024   Dental   Dentist two times every year? Yes            7/18/2024   TB Screening   Were you born outside of the [place]? No          Today's PHQ-9 Score:       7/18/2024     2:23 PM   PHQ-9 SCORE   PHQ-9  Total Score MyChart 18 (Moderately severe depression)   PHQ-9 Total Score 18         7/18/2024   Substance Use   Alcohol more than 3/day or more than 7/wk Not Applicable   Do you use any other substances recreationally? No        Social History     Tobacco Use    Smoking status: Never    Smokeless tobacco: Never   Vaping Use    Vaping status: Never Used           5/2/2022   LAST FHS-7 RESULTS   1st degree relative breast or ovarian cancer No   Any relative bilateral breast cancer No   Any male have breast cancer No   Any ONE woman have BOTH breast AND ovarian cancer No   Any woman with breast cancer before 50yrs No   2 or more relatives with breast AND/OR ovarian cancer No   2 or more relatives with breast AND/OR bowel cancer No           Mammogram Screening - Mammogram every 1-2 years updated in Health Maintenance based on mutual decision making        7/18/2024   STI Screening   New sexual partner(s) since last STI/HIV test? No        History of abnormal Pap smear: No - age 30- 64 PAP with HPV every 5 years recommended        Latest Ref Rng & Units 2/1/2021    10:09 AM 11/16/2015    12:00 PM   PAP / HPV   PAP Negative for squamous intraepithelial lesion or malignancy. Negative for squamous intraepithelial lesion or malignancy  Electronically signed by Mayte Engle CT (ASCP) on 2/10/2021 at 11:38 AM    Negative for squamous intraepithelial lesion or malignancy  Electronically signed by Priti Pichardo CT (ASCP) on 12/2/2015 at  3:55 PM      HPV 16 DNA NEG Negative     HPV 18 DNA NEG Negative     Other HR HPV NEG Negative       ASCVD Risk   The 10-year ASCVD risk score (Иван DAVIDSON, et al., 2019) is: 0.8%    Values used to calculate the score:      Age: 44 years      Sex: Female      Is Non- : No      Diabetic: No      Tobacco smoker: No      Systolic Blood Pressure: 135 mmHg      Is BP treated: No      HDL Cholesterol: 59 mg/dL      Total Cholesterol: 203 mg/dL        7/18/2024  "  Contraception/Family Planning   Questions about contraception or family planning No        Reviewed and updated as needed this visit by Provider                    Patient Active Problem List   Diagnosis    Generalized anxiety disorder    Other hyperlipidemia    Primary Female Infertility    Dysplastic Nevus    Insomnia    PMS (premenstrual syndrome)    ADHD (attention deficit hyperactivity disorder), combined type     Past Surgical History:   Procedure Laterality Date    ZZC APPENDECTOMY      Description: Appendectomy;  Recorded: 03/12/2010;       Social History     Tobacco Use    Smoking status: Never    Smokeless tobacco: Never   Substance Use Topics    Alcohol use: Not on file     No family history on file.      Current Outpatient Medications   Medication Sig Dispense Refill    amphetamine-dextroamphetamine (ADDERALL XR) 30 MG 24 hr capsule Take 1 capsule (30 mg) by mouth every morning 30 capsule 0    amphetamine-dextroamphetamine (ADDERALL) 15 MG tablet Take 1 tablet (15 mg) by mouth daily 30 tablet 0    levonorgestrel (MIRENA) 20 MCG/24HR IUD 1 each (20 mcg) by Intrauterine route once      multivitamin therapeutic tablet [MULTIVITAMIN THERAPEUTIC TABLET] Take 1 tablet by mouth daily.      Vitamin D, Cholecalciferol, 10 MCG (400 UNIT) TABS       ALPRAZolam (XANAX) 1 MG tablet TAKE 1/2 (ONE-HALF) TABLET BY MOUTH TWICE DAILY AND 1 TABLET EVERY 6 HOURS AS NEEDED FOR ANXIETY RELATED  TO  FLYING 34 tablet 0    venlafaxine (EFFEXOR XR) 37.5 MG 24 hr capsule Take 1 capsule (37.5 mg) by mouth daily 30 capsule 1    zolpidem (AMBIEN) 5 MG tablet TAKE 1 TABLET BY MOUTH ONCE DAILY NIGHTLY AS NEEDED 15 tablet 0     No Known Allergies         Objective    Exam  /85 (BP Location: Left arm, Patient Position: Left side, Cuff Size: Adult Large)   Pulse 82   Temp 98.1  F (36.7  C) (Oral)   Resp 16   Ht 1.702 m (5' 7\")   Wt 68.9 kg (152 lb)   SpO2 100%   BMI 23.81 kg/m     Estimated body mass index is 23.81 kg/m  as " "calculated from the following:    Height as of this encounter: 1.702 m (5' 7\").    Weight as of this encounter: 68.9 kg (152 lb).    Physical Exam  GENERAL: alert and no distress  EYES: Eyes grossly normal to inspection, PERRL and conjunctivae and sclerae normal  HENT: ear canals and TM's normal, nose and mouth without ulcers or lesions  NECK: no adenopathy, no asymmetry, masses, or scars  RESP: lungs clear to auscultation - no rales, rhonchi or wheezes  BREAST: normal without masses, tenderness or nipple discharge and no palpable axillary masses or adenopathy  CV: regular rate and rhythm, normal S1 S2, no S3 or S4, no murmur, click or rub, no peripheral edema  ABDOMEN: soft, nontender, no hepatosplenomegaly, no masses and bowel sounds normal  MS: no gross musculoskeletal defects noted, no edema  SKIN: no suspicious lesions or rashes  NEURO: Normal strength and tone, mentation intact and speech normal  PSYCH: mentation appears normal, affect normal/bright        Signed Electronically by: SIRIA JACKSON MD    Answers submitted by the patient for this visit:  Patient Health Questionnaire (Submitted on 7/18/2024)  If you checked off any problems, how difficult have these problems made it for you to do your work, take care of things at home, or get along with other people?: Extremely difficult  PHQ9 TOTAL SCORE: 18    "

## 2024-07-19 ENCOUNTER — TELEPHONE (OUTPATIENT)
Dept: FAMILY MEDICINE | Facility: CLINIC | Age: 44
End: 2024-07-19
Payer: COMMERCIAL

## 2024-07-19 DIAGNOSIS — F43.22 ADJUSTMENT DISORDER WITH ANXIOUS MOOD: Primary | ICD-10-CM

## 2024-07-19 NOTE — TELEPHONE ENCOUNTER
PRIOR AUTHORIZATION DENIED    Medication: VENLAFAXINE HCL ER 37.5 MG PO TB24  Insurance Company: [employer] - Phone [phone] Fax [phone]  Denial Date: 7/19/2024  Denial Reason(s):       Appeal Information:     Patient Notified: No

## 2024-07-21 DIAGNOSIS — F51.01 PRIMARY INSOMNIA: ICD-10-CM

## 2024-07-21 DIAGNOSIS — F41.1 GENERALIZED ANXIETY DISORDER: ICD-10-CM

## 2024-07-21 RX ORDER — VENLAFAXINE HYDROCHLORIDE 37.5 MG/1
37.5 CAPSULE, EXTENDED RELEASE ORAL DAILY
Qty: 30 CAPSULE | Refills: 1 | Status: SHIPPED | OUTPATIENT
Start: 2024-07-21 | End: 2024-08-19

## 2024-07-21 RX ORDER — ALPRAZOLAM 1 MG
TABLET ORAL
Qty: 34 TABLET | Refills: 0 | Status: SHIPPED | OUTPATIENT
Start: 2024-07-21 | End: 2024-08-19

## 2024-07-21 RX ORDER — ZOLPIDEM TARTRATE 5 MG/1
TABLET ORAL
Qty: 15 TABLET | Refills: 0 | Status: SHIPPED | OUTPATIENT
Start: 2024-07-21 | End: 2024-08-19

## 2024-07-22 RX ORDER — DEXTROAMPHETAMINE SACCHARATE, AMPHETAMINE ASPARTATE, DEXTROAMPHETAMINE SULFATE AND AMPHETAMINE SULFATE 3.75; 3.75; 3.75; 3.75 MG/1; MG/1; MG/1; MG/1
15 TABLET ORAL DAILY
Qty: 30 TABLET | Refills: 0 | Status: SHIPPED | OUTPATIENT
Start: 2024-07-22 | End: 2024-08-19

## 2024-07-22 RX ORDER — DEXTROAMPHETAMINE SACCHARATE, AMPHETAMINE ASPARTATE, DEXTROAMPHETAMINE SULFATE AND AMPHETAMINE SULFATE 3.75; 3.75; 3.75; 3.75 MG/1; MG/1; MG/1; MG/1
15 TABLET ORAL DAILY
Status: SHIPPED
Start: 2024-07-22 | End: 2024-07-22

## 2024-07-22 NOTE — ASSESSMENT & PLAN NOTE
She was open to giving a trial to venlafaxine extended release 37.5 mg capsule today.  Prescription provided

## 2024-07-22 NOTE — ASSESSMENT & PLAN NOTE
We had a good conversation regarding her current Adderall dosing.  She was seeing psychiatry last year and they came up on the current regimen of Adderall XR 30 mg in the morning and Adderall XR 15 mg in the early afternoon.  I expressed that I was concerned about that regimen and would feel more comfortable with the short acting 15 mg dose taken as needed in the afternoon when she needs to be able to focus into the afternoon.  A prescription for that was provided today with guidance to use up her 15 mg long-acting by taking 2 in the morning to equate to a 30 mg dose.  I asked her to follow-up in 3 months to make sure that this change is working well.

## 2024-07-24 ENCOUNTER — MYC REFILL (OUTPATIENT)
Dept: FAMILY MEDICINE | Facility: CLINIC | Age: 44
End: 2024-07-24
Payer: COMMERCIAL

## 2024-07-24 DIAGNOSIS — F90.2 ADHD (ATTENTION DEFICIT HYPERACTIVITY DISORDER), COMBINED TYPE: ICD-10-CM

## 2024-07-24 RX ORDER — DEXTROAMPHETAMINE SACCHARATE, AMPHETAMINE ASPARTATE, DEXTROAMPHETAMINE SULFATE AND AMPHETAMINE SULFATE 3.75; 3.75; 3.75; 3.75 MG/1; MG/1; MG/1; MG/1
15 TABLET ORAL DAILY
Qty: 30 TABLET | Refills: 0 | OUTPATIENT
Start: 2024-07-24

## 2024-08-19 ENCOUNTER — VIRTUAL VISIT (OUTPATIENT)
Dept: FAMILY MEDICINE | Facility: CLINIC | Age: 44
End: 2024-08-19
Payer: COMMERCIAL

## 2024-08-19 ENCOUNTER — MYC REFILL (OUTPATIENT)
Dept: FAMILY MEDICINE | Facility: CLINIC | Age: 44
End: 2024-08-19

## 2024-08-19 DIAGNOSIS — F41.1 GENERALIZED ANXIETY DISORDER: ICD-10-CM

## 2024-08-19 DIAGNOSIS — F41.1 GENERALIZED ANXIETY DISORDER: Primary | ICD-10-CM

## 2024-08-19 DIAGNOSIS — F90.2 ADHD (ATTENTION DEFICIT HYPERACTIVITY DISORDER), COMBINED TYPE: ICD-10-CM

## 2024-08-19 DIAGNOSIS — F51.01 PRIMARY INSOMNIA: ICD-10-CM

## 2024-08-19 PROCEDURE — 99213 OFFICE O/P EST LOW 20 MIN: CPT | Mod: 95 | Performed by: FAMILY MEDICINE

## 2024-08-19 RX ORDER — ALPRAZOLAM 1 MG
TABLET ORAL
Qty: 34 TABLET | Refills: 0 | Status: SHIPPED | OUTPATIENT
Start: 2024-08-19 | End: 2024-09-18

## 2024-08-19 RX ORDER — VENLAFAXINE HYDROCHLORIDE 75 MG/1
75 CAPSULE, EXTENDED RELEASE ORAL DAILY
Qty: 90 CAPSULE | Refills: 1 | Status: SHIPPED | OUTPATIENT
Start: 2024-08-19

## 2024-08-19 RX ORDER — ZOLPIDEM TARTRATE 5 MG/1
TABLET ORAL
Qty: 15 TABLET | Refills: 0 | Status: SHIPPED | OUTPATIENT
Start: 2024-08-19 | End: 2024-09-15

## 2024-08-19 RX ORDER — DEXTROAMPHETAMINE SACCHARATE, AMPHETAMINE ASPARTATE MONOHYDRATE, DEXTROAMPHETAMINE SULFATE AND AMPHETAMINE SULFATE 7.5; 7.5; 7.5; 7.5 MG/1; MG/1; MG/1; MG/1
30 CAPSULE, EXTENDED RELEASE ORAL EVERY MORNING
Qty: 30 CAPSULE | Refills: 0 | Status: SHIPPED | OUTPATIENT
Start: 2024-08-19 | End: 2024-09-14

## 2024-08-19 RX ORDER — DEXTROAMPHETAMINE SACCHARATE, AMPHETAMINE ASPARTATE, DEXTROAMPHETAMINE SULFATE AND AMPHETAMINE SULFATE 3.75; 3.75; 3.75; 3.75 MG/1; MG/1; MG/1; MG/1
15 TABLET ORAL DAILY
Qty: 30 TABLET | Refills: 0 | Status: SHIPPED | OUTPATIENT
Start: 2024-08-19 | End: 2024-09-14

## 2024-08-19 ASSESSMENT — ANXIETY QUESTIONNAIRES
7. FEELING AFRAID AS IF SOMETHING AWFUL MIGHT HAPPEN: NEARLY EVERY DAY
5. BEING SO RESTLESS THAT IT IS HARD TO SIT STILL: SEVERAL DAYS
GAD7 TOTAL SCORE: 19
8. IF YOU CHECKED OFF ANY PROBLEMS, HOW DIFFICULT HAVE THESE MADE IT FOR YOU TO DO YOUR WORK, TAKE CARE OF THINGS AT HOME, OR GET ALONG WITH OTHER PEOPLE?: VERY DIFFICULT
4. TROUBLE RELAXING: NEARLY EVERY DAY
3. WORRYING TOO MUCH ABOUT DIFFERENT THINGS: NEARLY EVERY DAY
1. FEELING NERVOUS, ANXIOUS, OR ON EDGE: NEARLY EVERY DAY
IF YOU CHECKED OFF ANY PROBLEMS ON THIS QUESTIONNAIRE, HOW DIFFICULT HAVE THESE PROBLEMS MADE IT FOR YOU TO DO YOUR WORK, TAKE CARE OF THINGS AT HOME, OR GET ALONG WITH OTHER PEOPLE: VERY DIFFICULT
7. FEELING AFRAID AS IF SOMETHING AWFUL MIGHT HAPPEN: NEARLY EVERY DAY
2. NOT BEING ABLE TO STOP OR CONTROL WORRYING: NEARLY EVERY DAY
6. BECOMING EASILY ANNOYED OR IRRITABLE: NEARLY EVERY DAY
GAD7 TOTAL SCORE: 19
GAD7 TOTAL SCORE: 19

## 2024-08-19 ASSESSMENT — PATIENT HEALTH QUESTIONNAIRE - PHQ9
SUM OF ALL RESPONSES TO PHQ QUESTIONS 1-9: 12
SUM OF ALL RESPONSES TO PHQ QUESTIONS 1-9: 12
10. IF YOU CHECKED OFF ANY PROBLEMS, HOW DIFFICULT HAVE THESE PROBLEMS MADE IT FOR YOU TO DO YOUR WORK, TAKE CARE OF THINGS AT HOME, OR GET ALONG WITH OTHER PEOPLE: VERY DIFFICULT

## 2024-08-19 NOTE — PROGRESS NOTES
Merle is a 44 year old who is being evaluated via a billable video visit.    How would you like to obtain your AVS? [employer]  If the video visit is dropped, the invitation should be resent by: Text to cell phone: [phone]  Will anyone else be joining your video visit? No      Assessment & Plan   Problem List Items Addressed This Visit       Generalized anxiety disorder - Primary     Patient is tolerating Effexor XR 37.5 mg dose without side effects and believes she may be having some mild benefit as well.  I recommended increasing the dose to 75 mg daily and asked her to provide me a [employer] message update in 1 month.         Relevant Medications    venlafaxine (EFFEXOR XR) 75 MG 24 hr capsule    ADHD (attention deficit hyperactivity disorder), combined type     Doing well with the adjustment in her Adderall to 30 mg of the long-acting in the morning and 15 mg of short acting in the early afternoon.                  Depression Screening Follow Up        8/19/2024     1:35 PM   PHQ   PHQ-9 Total Score 12   Q9: Thoughts of better off dead/self-harm past 2 weeks Not at all           Subjective   Merle is a 44 year old female presenting today for a follow-up medication check visit.  She has been on Effexor XR 37.5 mg daily for the past month.  She did have some mild side effects initially in the first few days but that has since resolved.  She is not sure she is noting much benefit yet but does think about a couple of subtle changes she has noted for the positive.  I also changed her Adderall dose as she was taking 2 long-acting medications 1 in the morning and 1 in the afternoon.  I switched the afternoon dose to a short acting Adderall 15 mg.  She says that she actually does like that dosage a bit better.  Med check      8/19/2024     1:29 PM   Additional Questions   Roomed by as   Accompanied by self         8/19/2024     1:29 PM   Patient Reported Additional Medications   Patient reports taking the following  new medications no           Objective           Vitals:  No vitals were obtained today due to virtual visit.    Physical Exam   GENERAL: alert and no distress  EYES: Eyes grossly normal to inspection.  No discharge or erythema, or obvious scleral/conjunctival abnormalities.  RESP: No audible wheeze, cough, or visible cyanosis.    SKIN: Visible skin clear. No significant rash, abnormal pigmentation or lesions.  NEURO: Cranial nerves grossly intact.  Mentation and speech appropriate for age.  PSYCH: Appropriate affect, tone, and pace of words          Video-Visit Details    Type of service:  Video Visit   Originating Location (pt. Location): Home    Distant Location (provider location):  On-site  Platform used for Video Visit: Em[employer]  Signed Electronically by: SIRIA JACKSON MD

## 2024-08-19 NOTE — ASSESSMENT & PLAN NOTE
Doing well with the adjustment in her Adderall to 30 mg of the long-acting in the morning and 15 mg of short acting in the early afternoon.

## 2024-08-19 NOTE — ASSESSMENT & PLAN NOTE
Patient is tolerating Effexor XR 37.5 mg dose without side effects and believes she may be having some mild benefit as well.  I recommended increasing the dose to 75 mg daily and asked her to provide me a [facility] message update in 1 month.

## 2024-08-22 ENCOUNTER — MYC MEDICAL ADVICE (OUTPATIENT)
Dept: FAMILY MEDICINE | Facility: CLINIC | Age: 44
End: 2024-08-22
Payer: COMMERCIAL

## 2024-08-22 DIAGNOSIS — M54.12 CERVICAL RADICULOPATHY: Primary | ICD-10-CM

## 2024-09-10 ENCOUNTER — E-VISIT (OUTPATIENT)
Dept: FAMILY MEDICINE | Facility: CLINIC | Age: 44
End: 2024-09-10
Payer: COMMERCIAL

## 2024-09-10 ENCOUNTER — MYC MEDICAL ADVICE (OUTPATIENT)
Dept: FAMILY MEDICINE | Facility: CLINIC | Age: 44
End: 2024-09-10

## 2024-09-10 DIAGNOSIS — F41.1 GENERALIZED ANXIETY DISORDER: Primary | ICD-10-CM

## 2024-09-10 DIAGNOSIS — L71.0 PERIORAL DERMATITIS: Primary | ICD-10-CM

## 2024-09-10 PROCEDURE — 99421 OL DIG E/M SVC 5-10 MIN: CPT | Performed by: FAMILY MEDICINE

## 2024-09-11 RX ORDER — VENLAFAXINE HYDROCHLORIDE 37.5 MG/1
CAPSULE, EXTENDED RELEASE ORAL
Qty: 28 CAPSULE | Refills: 0 | Status: SHIPPED | OUTPATIENT
Start: 2024-09-11 | End: 2024-10-09

## 2024-09-14 ENCOUNTER — MYC REFILL (OUTPATIENT)
Dept: FAMILY MEDICINE | Facility: CLINIC | Age: 44
End: 2024-09-14
Payer: COMMERCIAL

## 2024-09-14 DIAGNOSIS — F90.2 ADHD (ATTENTION DEFICIT HYPERACTIVITY DISORDER), COMBINED TYPE: ICD-10-CM

## 2024-09-14 DIAGNOSIS — F51.01 PRIMARY INSOMNIA: ICD-10-CM

## 2024-09-15 RX ORDER — ZOLPIDEM TARTRATE 5 MG/1
TABLET ORAL
Qty: 15 TABLET | Refills: 0 | Status: SHIPPED | OUTPATIENT
Start: 2024-09-15

## 2024-09-16 RX ORDER — DEXTROAMPHETAMINE SACCHARATE, AMPHETAMINE ASPARTATE, DEXTROAMPHETAMINE SULFATE AND AMPHETAMINE SULFATE 3.75; 3.75; 3.75; 3.75 MG/1; MG/1; MG/1; MG/1
15 TABLET ORAL DAILY
Qty: 30 TABLET | Refills: 0 | Status: SHIPPED | OUTPATIENT
Start: 2024-09-16

## 2024-09-16 RX ORDER — DEXTROAMPHETAMINE SACCHARATE, AMPHETAMINE ASPARTATE MONOHYDRATE, DEXTROAMPHETAMINE SULFATE AND AMPHETAMINE SULFATE 7.5; 7.5; 7.5; 7.5 MG/1; MG/1; MG/1; MG/1
30 CAPSULE, EXTENDED RELEASE ORAL EVERY MORNING
Qty: 30 CAPSULE | Refills: 0 | Status: SHIPPED | OUTPATIENT
Start: 2024-09-16

## 2024-09-18 DIAGNOSIS — F41.1 GENERALIZED ANXIETY DISORDER: ICD-10-CM

## 2024-09-18 RX ORDER — ALPRAZOLAM 1 MG
TABLET ORAL
Qty: 34 TABLET | Refills: 0 | Status: SHIPPED | OUTPATIENT
Start: 2024-09-18

## 2024-09-19 ENCOUNTER — OFFICE VISIT (OUTPATIENT)
Dept: FAMILY MEDICINE | Facility: CLINIC | Age: 44
End: 2024-09-19
Payer: COMMERCIAL

## 2024-09-19 VITALS — HEIGHT: 67 IN | BODY MASS INDEX: 23.81 KG/M2

## 2024-09-19 DIAGNOSIS — Z20.2 EXPOSURE TO SEXUALLY TRANSMITTED DISEASE (STD): Primary | ICD-10-CM

## 2024-09-19 LAB
CLUE CELLS: PRESENT
HCV AB SERPL QL IA: NONREACTIVE
HIV 1+2 AB+HIV1 P24 AG SERPL QL IA: NONREACTIVE
TRICHOMONAS, WET PREP: ABNORMAL
WBC'S/HIGH POWER FIELD, WET PREP: ABNORMAL
YEAST, WET PREP: ABNORMAL

## 2024-09-19 PROCEDURE — 87491 CHLMYD TRACH DNA AMP PROBE: CPT | Performed by: FAMILY MEDICINE

## 2024-09-19 PROCEDURE — 87210 SMEAR WET MOUNT SALINE/INK: CPT | Performed by: FAMILY MEDICINE

## 2024-09-19 PROCEDURE — 87389 HIV-1 AG W/HIV-1&-2 AB AG IA: CPT | Performed by: FAMILY MEDICINE

## 2024-09-19 PROCEDURE — 87591 N.GONORRHOEAE DNA AMP PROB: CPT | Performed by: FAMILY MEDICINE

## 2024-09-19 PROCEDURE — G2211 COMPLEX E/M VISIT ADD ON: HCPCS | Performed by: FAMILY MEDICINE

## 2024-09-19 PROCEDURE — 86803 HEPATITIS C AB TEST: CPT | Performed by: FAMILY MEDICINE

## 2024-09-19 PROCEDURE — 99213 OFFICE O/P EST LOW 20 MIN: CPT | Performed by: FAMILY MEDICINE

## 2024-09-19 PROCEDURE — 36415 COLL VENOUS BLD VENIPUNCTURE: CPT | Performed by: FAMILY MEDICINE

## 2024-09-19 RX ORDER — METRONIDAZOLE 500 MG/1
500 TABLET ORAL 2 TIMES DAILY
Qty: 14 TABLET | Refills: 0 | Status: SHIPPED | OUTPATIENT
Start: 2024-09-19 | End: 2024-09-26

## 2024-09-19 NOTE — PROGRESS NOTES
"  Assessment & Plan   Problem List Items Addressed This Visit    None  Visit Diagnoses       Exposure to sexually transmitted disease (STD)    -  Primary    Relevant Medications    metroNIDAZOLE (FLAGYL) 500 MG tablet    Other Relevant Orders    HIV Antigen Antibody Combo    Hepatitis C antibody    Wet prep - Clinic Collect (Completed)    Chlamydia & Gonorrhea by PCR, [facility] - Clinic Collect             I recommended STD testing which was performed today.  Her wet prep came back negative for trichomonas but because this is only 60 to 70% sensitive I recommended empirically treating as well with a course of metronidazole and a prescription was provided today.    Mely Bloom is a 44 year old who presents today with a concern regarding STD exposure.  The patient has been dating someone for the past approximately 6 months.  She had STD testing in December prior to becoming sexually active and at okay back negative.  She received a call from him that he tested positive for trichomonas.  She currently does not have any symptoms but would like to be tested.  STD CHECK      9/19/2024    10:41 AM   Additional Questions   Roomed by AS   Accompanied by SELF         9/19/2024    10:41 AM   Patient Reported Additional Medications   Patient reports taking the following new medications NO     History of Present Illness       Reason for visit:  Std check  Symptom progression:  Staying the same  Had these symptoms before:  No  What makes it worse:  No   She is taking medications regularly.           Objective    Ht 1.702 m (5' 7\")   BMI 23.81 kg/m    Body mass index is 23.81 kg/m .  Physical Exam   GENERAL: alert and no distress   (female) w/bimanual: normal female external genitalia, normal urethral meatus, normal vaginal mucosa without discharge            Signed Electronically by: SIRIA JACKSON MD    "

## 2024-09-20 LAB
C TRACH DNA SPEC QL PROBE+SIG AMP: NEGATIVE
N GONORRHOEA DNA SPEC QL NAA+PROBE: NEGATIVE

## 2024-09-23 ENCOUNTER — MYC MEDICAL ADVICE (OUTPATIENT)
Dept: FAMILY MEDICINE | Facility: CLINIC | Age: 44
End: 2024-09-23
Payer: COMMERCIAL

## 2024-09-23 DIAGNOSIS — B37.31 CANDIDAL VULVOVAGINITIS: Primary | ICD-10-CM

## 2024-09-24 ENCOUNTER — THERAPY VISIT (OUTPATIENT)
Dept: PHYSICAL THERAPY | Facility: REHABILITATION | Age: 44
End: 2024-09-24
Attending: FAMILY MEDICINE
Payer: COMMERCIAL

## 2024-09-24 DIAGNOSIS — M54.12 CERVICAL RADICULOPATHY: ICD-10-CM

## 2024-09-24 PROCEDURE — 97112 NEUROMUSCULAR REEDUCATION: CPT | Mod: GP | Performed by: PHYSICAL THERAPIST

## 2024-09-24 PROCEDURE — 97161 PT EVAL LOW COMPLEX 20 MIN: CPT | Mod: GP | Performed by: PHYSICAL THERAPIST

## 2024-09-24 NOTE — PROGRESS NOTES
PHYSICAL THERAPY EVALUATION  Type of Visit: Evaluation        Fall Risk Screen:  Fall screen completed by: PT  Have you fallen 2 or more times in the past year?: No  Have you fallen and had an injury in the past year?: No  Is patient a fall risk?: No    Subjective       Presenting condition or subjective complaint:   Pt reports she remembers she woke up funny 1 morning and couldn't move her neck - was tipped off to L side. Felt like it was stuck for about 1 week but then she has had increased L upper shoulder pain and sometimes it travels down to her 3rd finger - neck pain incident happened 2 years ago but pt has been dealing with a significant amount of stress with going through a divorce, losing her job and having to move. She now has some time to be able to concentrate on these sx.   Pt reports she has been packing and lifting a lot due to moving 10/3/24 and will probably take a couple of weeks to finish getting moved in.  Pt reports 30 years of migraines but she did about 1.5 years of yoga and that stopped her migraines.  Pt can get increased pain when she is holding her cell phone, lifting, eating, driving - can happen randomly.  Pt reports she struggles with some sx with ADD and dyslexia.    Pt has a 7th grade daughter.    Date of onset: 08/22/24    Relevant medical history: Dizziness; Migraines or headaches   Dates & types of surgery:      Prior diagnostic imaging/testing results: MRI     Prior therapy history for the same diagnosis, illness or injury:        Prior Level of Function  Transfers: Independent  Ambulation: Independent  ADL: Independent      Living Environment  Social support: With family members   Type of home: House; 2-story   Stairs to enter the home:         Ramp: No   Stairs inside the home: Yes 15 Is there a railing: Yes     Help at home: Medication and/or finances  Equipment owned: Bath bench     Employment: No    Hobbies/Interests:      Patient goals for therapy: stuff    Pain assessment:   1-10/10 can vary but usually always there in L top of shoulder blade by neck     Objective   CERVICAL SPINE EVALUATION    POSTURE:  Minimal forward head, rounded shoulders  GAIT:   Weightbearing Status: WBAT  Assistive Device(s): None  Gait Deviations: WFL with B mild trendelenberg    ROM:  Cervical flexion 50 degrees without increased pain, ext 50 degrees with slight increase in L sided pain, R rotation 54 degrees with stretch, L rotation 44 degrees with increased pain L side and tingling down L arm   B SB 11 cm with contra side tightness    MYOTOMES:  B UE grossly 5/5 except B ER 4+/5 with effort    DERMATOMES:  Intact to light touch B UE  NEURAL TENSION:  Positive median and ulnar nerve pathways L, negative radial and R UE  FLEXIBILITY:  Increased tension B upper trap, lev scap, cervicothoracic paraspinals        Assessment & Plan   CLINICAL IMPRESSIONS  Medical Diagnosis: Cervical radiculopathy (M54.12)    Treatment Diagnosis: Cervical radiculopathy (M54.12)   Impression/Assessment: Patient is a 44 year old female with L sided neck and UE pain complaints.  Pt reports sx for past 2 years as she woke up with neck pain 1 morning, had limited ROM for about 1 week and has struggled with pain ever since. Pt was limited with seeking medical assistance for this as she was going through a divorce, job loss and having to move. The following significant findings have been identified: Pain, Decreased ROM/flexibility, Decreased joint mobility, Decreased strength, Impaired muscle performance, Decreased activity tolerance, and Impaired posture. These impairments interfere with their ability to perform self care tasks, recreational activities, household chores, driving , household mobility, and community mobility as compared to previous level of function.     Clinical Decision Making (Complexity):  Clinical Presentation: Stable/Uncomplicated  Clinical Presentation Rationale: based on medical and personal factors listed in PT  evaluation  Clinical Decision Making (Complexity): Low complexity    PLAN OF CARE  Treatment Interventions:  Interventions: Manual Therapy, Neuromuscular Re-education, Therapeutic Activity, Therapeutic Exercise, Self-Care/Home Management    Long Term Goals     PT Goal 1  Goal Description: Pt will be able to turn her head > 45 degrees L rotation for improved ability to perform ADLs and driving to check her blind spot without increase in neck or L arm pain for improved ability to perform functional tasks and ADLs in 90 days.  Target Date: 12/22/24      Frequency of Treatment: 1x/week  Duration of Treatment: 12 weeks    Risks and benefits of evaluation/treatment have been explained.   Patient/Family/caregiver agrees with Plan of Care.     Evaluation Time:     PT Eval, Low Complexity Minutes (77610): 30       Signing Clinician: Maggi Buck PT, DPT, CLT        [facility]                                                                                   OUTPATIENT PHYSICAL THERAPY      PLAN OF TREATMENT FOR OUTPATIENT REHABILITATION   Patient's Last Name, First Name, MARGOTHMerle Silverio YOB: 1980   Provider's Name   [facility]   Medical Record No.  [id number]     Onset Date: 08/22/24  Start of Care Date: 09/24/24     Medical Diagnosis:  Cervical radiculopathy (M54.12)      PT Treatment Diagnosis:  Cervical radiculopathy (M54.12) Plan of Treatment  Frequency/Duration: 1x/week/ 12 weeks    Certification date from 09/24/24 to 12/22/24         See note for plan of treatment details and functional goals     Maggi Buck PT, DPT, CLT                        I CERTIFY THE NEED FOR THESE SERVICES FURNISHED UNDER        THIS PLAN OF TREATMENT AND WHILE UNDER MY CARE     (Physician attestation of this document indicates review and certification of the therapy plan).              Referring Provider:  Lavonne Fernandez MD    Initial Assessment  See  Epic Evaluation- Start of Care Date: 09/24/24

## 2024-10-02 RX ORDER — FLUCONAZOLE 150 MG/1
150 TABLET ORAL ONCE
Qty: 1 TABLET | Refills: 0 | Status: SHIPPED | OUTPATIENT
Start: 2024-10-02 | End: 2024-10-02

## 2024-10-10 ENCOUNTER — THERAPY VISIT (OUTPATIENT)
Dept: PHYSICAL THERAPY | Facility: REHABILITATION | Age: 44
End: 2024-10-10
Payer: COMMERCIAL

## 2024-10-10 DIAGNOSIS — M54.12 CERVICAL RADICULOPATHY: Primary | ICD-10-CM

## 2024-10-10 PROCEDURE — 97110 THERAPEUTIC EXERCISES: CPT | Mod: GP | Performed by: PHYSICAL THERAPIST

## 2024-10-10 PROCEDURE — 97140 MANUAL THERAPY 1/> REGIONS: CPT | Mod: GP | Performed by: PHYSICAL THERAPIST

## 2024-10-10 PROCEDURE — 97112 NEUROMUSCULAR REEDUCATION: CPT | Mod: GP | Performed by: PHYSICAL THERAPIST

## 2024-10-11 DIAGNOSIS — F51.01 PRIMARY INSOMNIA: ICD-10-CM

## 2024-10-12 RX ORDER — ZOLPIDEM TARTRATE 5 MG/1
TABLET ORAL
Qty: 15 TABLET | Refills: 0 | Status: SHIPPED | OUTPATIENT
Start: 2024-10-12 | End: 2024-11-11

## 2024-10-12 NOTE — CONFIDENTIAL NOTE
PDMP reviewed.  She does fill zolpidem approximately every 30 days.  Today's refill is consistent with established plan and management through her PCP.

## 2024-10-16 ENCOUNTER — MYC REFILL (OUTPATIENT)
Dept: FAMILY MEDICINE | Facility: CLINIC | Age: 44
End: 2024-10-16
Payer: COMMERCIAL

## 2024-10-16 DIAGNOSIS — F41.1 GENERALIZED ANXIETY DISORDER: ICD-10-CM

## 2024-10-16 DIAGNOSIS — F90.2 ADHD (ATTENTION DEFICIT HYPERACTIVITY DISORDER), COMBINED TYPE: ICD-10-CM

## 2024-10-16 RX ORDER — DEXTROAMPHETAMINE SACCHARATE, AMPHETAMINE ASPARTATE MONOHYDRATE, DEXTROAMPHETAMINE SULFATE AND AMPHETAMINE SULFATE 7.5; 7.5; 7.5; 7.5 MG/1; MG/1; MG/1; MG/1
30 CAPSULE, EXTENDED RELEASE ORAL EVERY MORNING
Qty: 30 CAPSULE | Refills: 0 | Status: SHIPPED | OUTPATIENT
Start: 2024-10-16 | End: 2024-11-13

## 2024-10-16 RX ORDER — DEXTROAMPHETAMINE SACCHARATE, AMPHETAMINE ASPARTATE, DEXTROAMPHETAMINE SULFATE AND AMPHETAMINE SULFATE 3.75; 3.75; 3.75; 3.75 MG/1; MG/1; MG/1; MG/1
15 TABLET ORAL DAILY
Qty: 30 TABLET | Refills: 0 | Status: SHIPPED | OUTPATIENT
Start: 2024-10-16 | End: 2024-11-13

## 2024-10-16 RX ORDER — ALPRAZOLAM 1 MG/1
TABLET ORAL
Qty: 34 TABLET | Refills: 0 | Status: SHIPPED | OUTPATIENT
Start: 2024-10-16 | End: 2024-11-13

## 2024-10-17 DIAGNOSIS — F41.1 GENERALIZED ANXIETY DISORDER: ICD-10-CM

## 2024-10-17 NOTE — TELEPHONE ENCOUNTER
Script past end date. Do you want to prescribe a 150mg capsule?    Jovanni Bullock RN     [facility]

## 2024-10-17 NOTE — TELEPHONE ENCOUNTER
Spoke with patient - She has been taking the 75mg and 37.5mg tablets as instructed (in [employer] message 09/10/2024) Patient states she is tolerating it well and is ready to move to 150mg dose

## 2024-10-18 RX ORDER — VENLAFAXINE HYDROCHLORIDE 150 MG/1
150 CAPSULE, EXTENDED RELEASE ORAL DAILY
Qty: 90 CAPSULE | Refills: 1 | Status: SHIPPED | OUTPATIENT
Start: 2024-10-18 | End: 2024-11-04

## 2024-10-18 RX ORDER — VENLAFAXINE HYDROCHLORIDE 37.5 MG/1
CAPSULE, EXTENDED RELEASE ORAL
Qty: 28 CAPSULE | Refills: 0 | OUTPATIENT
Start: 2024-10-18

## 2024-10-28 ENCOUNTER — TELEPHONE (OUTPATIENT)
Dept: FAMILY MEDICINE | Facility: CLINIC | Age: 44
End: 2024-10-28

## 2024-10-28 ENCOUNTER — THERAPY VISIT (OUTPATIENT)
Dept: PHYSICAL THERAPY | Facility: REHABILITATION | Age: 44
End: 2024-10-28
Payer: COMMERCIAL

## 2024-10-28 DIAGNOSIS — M54.12 CERVICAL RADICULOPATHY: Primary | ICD-10-CM

## 2024-10-28 PROCEDURE — 97112 NEUROMUSCULAR REEDUCATION: CPT | Mod: GP | Performed by: PHYSICAL THERAPIST

## 2024-10-28 PROCEDURE — 97140 MANUAL THERAPY 1/> REGIONS: CPT | Mod: GP | Performed by: PHYSICAL THERAPIST

## 2024-10-28 PROCEDURE — 97110 THERAPEUTIC EXERCISES: CPT | Mod: GP | Performed by: PHYSICAL THERAPIST

## 2024-10-28 NOTE — TELEPHONE ENCOUNTER
Patient would need an appointment to discuss so I would have her keep the appointment she has scheduled in November.

## 2024-10-28 NOTE — TELEPHONE ENCOUNTER
New Medication Request        What medication are you requesting?: Xulane patches    Reason for medication request: brain fog/ menopause issues    Have you taken this medication before?: No    Controlled Substance Agreement on file:   CSA -- Patient Level:    CSA: None found at the patient level.         Patient offered an appointment? Yes: Virtual appt scheduled 11/4- Please let patient know if appt is NOT needed    Preferred Pharmacy:   [facility] [address] [place] [address] [address] [facility]  [address] [facility] [address]  Phone: [phone] Fax: [phone]      Could we send this information to you in [employer] or would you prefer to receive a phone call?:   Patient would prefer a phone call   Okay to leave a detailed message?: Yes at Cell number on file:    Telephone Information:   Mobile [phone]       Writer calling patient to inform her that her secondary insurance has  as of 2020 and to see if patient plans on re-enrolling for Medicaid as secondary.   Writer spoke to patient and explained that her Medicaid  as of 2020. Patient stated she doesn't have time to re-enroll before surgery and stated to go ahead with just her primary [facility] insurance. Writer informed patient that if she does re-enroll into Medicaid there may be a possibility that they could back date but there are no guarantees. Patient understood and had no question or concerns for the writer.

## 2024-11-04 ENCOUNTER — THERAPY VISIT (OUTPATIENT)
Dept: PHYSICAL THERAPY | Facility: REHABILITATION | Age: 44
End: 2024-11-04
Payer: COMMERCIAL

## 2024-11-04 ENCOUNTER — OFFICE VISIT (OUTPATIENT)
Dept: FAMILY MEDICINE | Facility: CLINIC | Age: 44
End: 2024-11-04
Payer: COMMERCIAL

## 2024-11-04 DIAGNOSIS — M54.12 CERVICAL RADICULOPATHY: Primary | ICD-10-CM

## 2024-11-04 DIAGNOSIS — F41.1 GENERALIZED ANXIETY DISORDER: ICD-10-CM

## 2024-11-04 PROCEDURE — 97140 MANUAL THERAPY 1/> REGIONS: CPT | Mod: GP | Performed by: PHYSICAL THERAPIST

## 2024-11-04 PROCEDURE — G2211 COMPLEX E/M VISIT ADD ON: HCPCS | Performed by: FAMILY MEDICINE

## 2024-11-04 PROCEDURE — 97110 THERAPEUTIC EXERCISES: CPT | Mod: GP | Performed by: PHYSICAL THERAPIST

## 2024-11-04 PROCEDURE — 99213 OFFICE O/P EST LOW 20 MIN: CPT | Performed by: FAMILY MEDICINE

## 2024-11-04 RX ORDER — VENLAFAXINE HYDROCHLORIDE 75 MG/1
75 CAPSULE, EXTENDED RELEASE ORAL DAILY
Qty: 90 CAPSULE | Refills: 3 | Status: SHIPPED | OUTPATIENT
Start: 2024-11-04

## 2024-11-04 NOTE — ASSESSMENT & PLAN NOTE
Unfortunately, she had side effects when the Effexor was increased to the 150 mg daily dose.  She is now back down to the 75 mg dose and tolerating that better.  I recommended continuing on that dose of medication with a reevaluation in the next 3 months.

## 2024-11-04 NOTE — PROGRESS NOTES
"Merle is a 44 year old who is being evaluated via a billable video visit.    How would you like to obtain your AVS? Brycet  If the video visit is dropped, the invitation should be resent by: Text to cell phone: [phone]  Will anyone else be joining your video visit? No      Assessment & Plan   Problem List Items Addressed This Visit       Generalized anxiety disorder     Unfortunately, she had side effects when the Effexor was increased to the 150 mg daily dose.  She is now back down to the 75 mg dose and tolerating that better.  I recommended continuing on that dose of medication with a reevaluation in the next 3 months.         Relevant Medications    venlafaxine (EFFEXOR XR) 75 MG 24 hr capsule      We had a good conversation today regarding pre-, yosef and postmenopausal state.  We discussed that she is clearly still premenopausal and has the Mirena IUD for hormonal affect for the menorrhagia.  We discussed that the patch that her sister recommended is actually a birth control patch and would not be indicated to be used along with the Mirena IUD nor do I think it would likely provide any significant benefit in terms of her mental health concerns.    Subjective   Merle is a 44 year old who presents today to discuss a hormone patch that her sister told her about might be a good idea.  The patient was talking to her sister who was talking about menopause and perimenopause.  She stated that she was on a hormonal patch, Xulane, that that might be a good option for her.  She says that one of her main symptoms she is trying to deal with is feeling \"brain fog.\"  She says that occasionally she feels like she has a night sweat but not frequently.   she is currently on  the Mirena IUD which was placed in part due to significant menorrhagia.  She has had significant reduction in the heaviness of her bleeding but she does continue to have a menstrual cycle monthly and bleeds lightly for about 5 days.  She was " recently started in the past couple months on Effexor to help with generalized anxiety disorder.  She does feel like the medication is likely providing some benefit although she had significant nausea as a side effect when we increase the dose to 150 mg.  Pt would like to start hormone patches          Objective           Vitals:  No vitals were obtained today due to virtual visit.    Physical Exam   GENERAL: alert and no distress  EYES: Eyes grossly normal to inspection.  No discharge or erythema, or obvious scleral/conjunctival abnormalities.  RESP: No audible wheeze, cough, or visible cyanosis.    SKIN: Visible skin clear. No significant rash, abnormal pigmentation or lesions.  NEURO: Cranial nerves grossly intact.  Mentation and speech appropriate for age.  PSYCH: Appropriate affect, tone, and pace of words          Video-Visit Details    Type of service:  Video Visit     Originating Location (pt. Location): Home    Distant Location (provider location):  On-site  Platform [facility] Video Visit[facility]  Signed Electronically by: SIRIA JACKSON MD    The longitudinal plan of care for the diagnosis(es)/condition(s) as documented were addressed during this visit. Due to the added complexity in care, I will continue to support Merle in the subsequent management and with ongoing continuity of care.

## 2024-11-05 ASSESSMENT — PATIENT HEALTH QUESTIONNAIRE - PHQ9: SUM OF ALL RESPONSES TO PHQ QUESTIONS 1-9: 19

## 2024-11-11 DIAGNOSIS — F51.01 PRIMARY INSOMNIA: ICD-10-CM

## 2024-11-11 RX ORDER — ZOLPIDEM TARTRATE 5 MG/1
TABLET ORAL
Qty: 15 TABLET | Refills: 0 | Status: SHIPPED | OUTPATIENT
Start: 2024-11-11

## 2024-11-12 ENCOUNTER — THERAPY VISIT (OUTPATIENT)
Dept: PHYSICAL THERAPY | Facility: REHABILITATION | Age: 44
End: 2024-11-12
Payer: COMMERCIAL

## 2024-11-12 DIAGNOSIS — M54.12 CERVICAL RADICULOPATHY: Primary | ICD-10-CM

## 2024-11-12 PROCEDURE — 97140 MANUAL THERAPY 1/> REGIONS: CPT | Mod: GP | Performed by: PHYSICAL THERAPIST

## 2024-11-12 PROCEDURE — 97110 THERAPEUTIC EXERCISES: CPT | Mod: GP | Performed by: PHYSICAL THERAPIST

## 2024-11-13 ENCOUNTER — MYC REFILL (OUTPATIENT)
Dept: FAMILY MEDICINE | Facility: CLINIC | Age: 44
End: 2024-11-13
Payer: COMMERCIAL

## 2024-11-13 DIAGNOSIS — F41.1 GENERALIZED ANXIETY DISORDER: ICD-10-CM

## 2024-11-13 DIAGNOSIS — F90.2 ADHD (ATTENTION DEFICIT HYPERACTIVITY DISORDER), COMBINED TYPE: ICD-10-CM

## 2024-11-13 RX ORDER — ALPRAZOLAM 1 MG/1
TABLET ORAL
Qty: 34 TABLET | Refills: 0 | Status: SHIPPED | OUTPATIENT
Start: 2024-11-13

## 2024-11-13 RX ORDER — DEXTROAMPHETAMINE SACCHARATE, AMPHETAMINE ASPARTATE MONOHYDRATE, DEXTROAMPHETAMINE SULFATE AND AMPHETAMINE SULFATE 7.5; 7.5; 7.5; 7.5 MG/1; MG/1; MG/1; MG/1
30 CAPSULE, EXTENDED RELEASE ORAL EVERY MORNING
Qty: 30 CAPSULE | Refills: 0 | Status: SHIPPED | OUTPATIENT
Start: 2024-11-13

## 2024-11-13 RX ORDER — DEXTROAMPHETAMINE SACCHARATE, AMPHETAMINE ASPARTATE, DEXTROAMPHETAMINE SULFATE AND AMPHETAMINE SULFATE 3.75; 3.75; 3.75; 3.75 MG/1; MG/1; MG/1; MG/1
15 TABLET ORAL DAILY
Qty: 30 TABLET | Refills: 0 | Status: SHIPPED | OUTPATIENT
Start: 2024-11-13

## 2024-12-04 DIAGNOSIS — F51.01 PRIMARY INSOMNIA: ICD-10-CM

## 2024-12-05 ENCOUNTER — THERAPY VISIT (OUTPATIENT)
Dept: PHYSICAL THERAPY | Facility: REHABILITATION | Age: 44
End: 2024-12-05
Payer: COMMERCIAL

## 2024-12-05 DIAGNOSIS — M54.12 CERVICAL RADICULOPATHY: Primary | ICD-10-CM

## 2024-12-05 PROCEDURE — 97140 MANUAL THERAPY 1/> REGIONS: CPT | Mod: GP | Performed by: PHYSICAL THERAPIST

## 2024-12-05 PROCEDURE — 97110 THERAPEUTIC EXERCISES: CPT | Mod: GP | Performed by: PHYSICAL THERAPIST

## 2024-12-05 RX ORDER — ZOLPIDEM TARTRATE 5 MG/1
TABLET ORAL
Qty: 15 TABLET | Refills: 0 | Status: SHIPPED | OUTPATIENT
Start: 2024-12-05

## 2024-12-05 NOTE — PATIENT INSTRUCTIONS
Exercises    For WHOLE neck to body stretching    ROTATE    Top leg forward and bottom leg backward to keep spine rotation big - neck can go with hands to get rotation all the way up x3-5 reps each side    Forward/backward movement    X5-10 reps    Side to side movement x5-10 reps - more on tighter side        WHEN laying on stomach look L first    Leg shakes up the wall    20 reps toes rotate in and out

## 2024-12-11 ENCOUNTER — E-VISIT (OUTPATIENT)
Dept: FAMILY MEDICINE | Facility: CLINIC | Age: 44
End: 2024-12-11
Payer: COMMERCIAL

## 2024-12-11 ENCOUNTER — MYC REFILL (OUTPATIENT)
Dept: FAMILY MEDICINE | Facility: CLINIC | Age: 44
End: 2024-12-11

## 2024-12-11 DIAGNOSIS — N89.8 VAGINAL DISCHARGE: Primary | ICD-10-CM

## 2024-12-11 DIAGNOSIS — F41.1 GENERALIZED ANXIETY DISORDER: ICD-10-CM

## 2024-12-11 DIAGNOSIS — F90.2 ADHD (ATTENTION DEFICIT HYPERACTIVITY DISORDER), COMBINED TYPE: ICD-10-CM

## 2024-12-11 RX ORDER — ALPRAZOLAM 1 MG/1
TABLET ORAL
Qty: 34 TABLET | Refills: 0 | Status: SHIPPED | OUTPATIENT
Start: 2024-12-11 | End: 2024-12-12

## 2024-12-11 RX ORDER — FLUCONAZOLE 150 MG/1
150 TABLET ORAL ONCE
Qty: 1 TABLET | Refills: 0 | Status: SHIPPED | OUTPATIENT
Start: 2024-12-11 | End: 2024-12-11

## 2024-12-11 RX ORDER — DEXTROAMPHETAMINE SACCHARATE, AMPHETAMINE ASPARTATE MONOHYDRATE, DEXTROAMPHETAMINE SULFATE AND AMPHETAMINE SULFATE 7.5; 7.5; 7.5; 7.5 MG/1; MG/1; MG/1; MG/1
30 CAPSULE, EXTENDED RELEASE ORAL EVERY MORNING
Qty: 30 CAPSULE | Refills: 0 | Status: SHIPPED | OUTPATIENT
Start: 2024-12-11

## 2024-12-11 NOTE — PATIENT INSTRUCTIONS
Thank you for choosing us for your care. I have placed an order for a prescription so that you can start treatment. View your full visit summary for details by clicking on the link below. Your pharmacist will able to address any questions you may have about the medication.     If you re not feeling better within 2-3 days, please schedule an appointment.  You can schedule an appointment right here in [employer], or call [phone]  If the visit is for the same symptoms as your eVisit, we ll refund the cost of your eVisit if seen within seven days.    Thank you for choosing us for your care. Given your symptoms, I would like you to do a lab-only visit to determine what is causing them.  I have placed the orders.  Please schedule an appointment with the lab right here in [employer], or call [phone].  I will let you know when the results are back and next steps to take.    Schedule a Lab Only appointment here.     Vaginal Yeast Infection: Care Instructions  Overview     A vaginal yeast infection is the growth of too many yeast cells in the vagina. This is a common problem. Itching, vaginal discharge and irritation, and other symptoms can bother you. But yeast infections don't often cause other health problems.  Some medicines can increase your risk of getting a yeast infection. These include antibiotics, hormones, and steroids. You may also be more likely to get a yeast infection if you are pregnant, have diabetes, douche, or wear tight clothes.  With treatment, most yeast infections get better in a few days.  Follow-up care is a key part of your treatment and safety. Be sure to make and go to all appointments, and call your doctor if you are having problems. It's also a good idea to know your test results and keep a list of the medicines you take.  How can you care for yourself at home?  Take your medicines exactly as prescribed. Call your doctor if you think you are having a problem with your medicine.  Ask your  "doctor about over-the-counter (OTC) medicines for yeast infections. If you use an OTC treatment, read and follow all instructions on the label.  Don't use tampons while using a vaginal cream or suppository. The tampons can absorb the medicine. Use pads instead.  Wear loose cotton clothing. Don't wear nylon or other fabric that holds body heat and moisture close to the skin.  Try sleeping without underwear.  Don't scratch. Relieve itching with a cold pack or a cool bath.  Don't wash your vulva more than once a day. Use plain water or a mild, unscented soap. Air-dry the vulva.  Change out of wet or damp clothes as soon as possible.  If you are using a vaginal medicine, don't have sex until you have finished your treatment. But if you do have sex, don't depend on a condom or diaphragm for birth control. The oil in some vaginal medicines weakens latex.  Don't douche or use powders, sprays, or perfumes in your vagina or on your vulva. These items can change the normal balance of organisms in your vagina.  When should you call for help?   Call your doctor now or seek immediate medical care if:    You have new or increased pain in your vagina or pelvis.   Watch closely for changes in your health, and be sure to contact your doctor if:    You have unexpected vaginal bleeding.     You have a fever.     You are not getting better after 2 days.     Your symptoms come back after you finish your medicines.   Where can you learn more?  Go to https://www.[employer].net/patiented  Enter F639 in the search box to learn more about \"Vaginal Yeast Infection: Care Instructions.\"  Current as of: November 27, 2023  Content Version: 14.2 2024 [facility] [employer].   Care instructions adapted under license by your healthcare professional. If you have questions about a medical condition or this instruction, always ask your healthcare professional. [employer] disclaims any warranty or liability for your use of this " information.

## 2024-12-12 DIAGNOSIS — F41.1 GENERALIZED ANXIETY DISORDER: ICD-10-CM

## 2024-12-12 RX ORDER — ALPRAZOLAM 1 MG/1
TABLET ORAL
Qty: 34 TABLET | Refills: 0 | Status: SHIPPED | OUTPATIENT
Start: 2024-12-12

## 2024-12-13 ENCOUNTER — MYC REFILL (OUTPATIENT)
Dept: FAMILY MEDICINE | Facility: CLINIC | Age: 44
End: 2024-12-13
Payer: COMMERCIAL

## 2024-12-13 DIAGNOSIS — F90.2 ADHD (ATTENTION DEFICIT HYPERACTIVITY DISORDER), COMBINED TYPE: ICD-10-CM

## 2024-12-14 RX ORDER — DEXTROAMPHETAMINE SACCHARATE, AMPHETAMINE ASPARTATE, DEXTROAMPHETAMINE SULFATE AND AMPHETAMINE SULFATE 3.75; 3.75; 3.75; 3.75 MG/1; MG/1; MG/1; MG/1
15 TABLET ORAL DAILY
Qty: 30 TABLET | Refills: 0 | Status: SHIPPED | OUTPATIENT
Start: 2024-12-14

## 2024-12-17 ENCOUNTER — MYC MEDICAL ADVICE (OUTPATIENT)
Dept: FAMILY MEDICINE | Facility: CLINIC | Age: 44
End: 2024-12-17

## 2024-12-17 ENCOUNTER — LAB (OUTPATIENT)
Dept: LAB | Facility: CLINIC | Age: 44
End: 2024-12-17
Payer: COMMERCIAL

## 2024-12-17 DIAGNOSIS — N89.8 VAGINAL DISCHARGE: ICD-10-CM

## 2024-12-17 LAB
CLUE CELLS: PRESENT
TRICHOMONAS, WET PREP: ABNORMAL
WBC'S/HIGH POWER FIELD, WET PREP: ABNORMAL
YEAST, WET PREP: ABNORMAL

## 2024-12-17 PROCEDURE — 87210 SMEAR WET MOUNT SALINE/INK: CPT

## 2024-12-18 DIAGNOSIS — N76.0 BACTERIAL VAGINOSIS: Primary | ICD-10-CM

## 2024-12-18 DIAGNOSIS — B96.89 BACTERIAL VAGINOSIS: Primary | ICD-10-CM

## 2024-12-18 RX ORDER — METRONIDAZOLE 500 MG/1
500 TABLET ORAL 2 TIMES DAILY
Qty: 14 TABLET | Refills: 0 | Status: SHIPPED | OUTPATIENT
Start: 2024-12-18 | End: 2024-12-25

## 2025-01-05 DIAGNOSIS — F51.01 PRIMARY INSOMNIA: ICD-10-CM

## 2025-01-06 RX ORDER — ZOLPIDEM TARTRATE 5 MG/1
TABLET ORAL
Qty: 15 TABLET | Refills: 0 | Status: SHIPPED | OUTPATIENT
Start: 2025-01-06 | End: 2025-01-07

## 2025-01-07 ENCOUNTER — MYC REFILL (OUTPATIENT)
Dept: FAMILY MEDICINE | Facility: CLINIC | Age: 45
End: 2025-01-07
Payer: COMMERCIAL

## 2025-01-07 DIAGNOSIS — F90.2 ADHD (ATTENTION DEFICIT HYPERACTIVITY DISORDER), COMBINED TYPE: ICD-10-CM

## 2025-01-07 DIAGNOSIS — F51.01 PRIMARY INSOMNIA: ICD-10-CM

## 2025-01-07 DIAGNOSIS — F41.1 GENERALIZED ANXIETY DISORDER: ICD-10-CM

## 2025-01-08 RX ORDER — ZOLPIDEM TARTRATE 5 MG/1
5 TABLET ORAL
Qty: 15 TABLET | Refills: 0 | Status: SHIPPED | OUTPATIENT
Start: 2025-01-08

## 2025-01-08 RX ORDER — DEXTROAMPHETAMINE SACCHARATE, AMPHETAMINE ASPARTATE MONOHYDRATE, DEXTROAMPHETAMINE SULFATE AND AMPHETAMINE SULFATE 7.5; 7.5; 7.5; 7.5 MG/1; MG/1; MG/1; MG/1
30 CAPSULE, EXTENDED RELEASE ORAL EVERY MORNING
Qty: 30 CAPSULE | Refills: 0 | Status: SHIPPED | OUTPATIENT
Start: 2025-01-08

## 2025-01-08 RX ORDER — DEXTROAMPHETAMINE SACCHARATE, AMPHETAMINE ASPARTATE, DEXTROAMPHETAMINE SULFATE AND AMPHETAMINE SULFATE 3.75; 3.75; 3.75; 3.75 MG/1; MG/1; MG/1; MG/1
15 TABLET ORAL DAILY
Qty: 30 TABLET | Refills: 0 | Status: SHIPPED | OUTPATIENT
Start: 2025-01-08

## 2025-01-08 RX ORDER — ALPRAZOLAM 1 MG/1
TABLET ORAL
Qty: 34 TABLET | Refills: 0 | Status: SHIPPED | OUTPATIENT
Start: 2025-01-08

## 2025-01-16 ENCOUNTER — OFFICE VISIT (OUTPATIENT)
Dept: FAMILY MEDICINE | Facility: CLINIC | Age: 45
End: 2025-01-16
Payer: COMMERCIAL

## 2025-01-16 VITALS — BODY MASS INDEX: 23.81 KG/M2 | HEIGHT: 67 IN

## 2025-01-16 DIAGNOSIS — B96.89 BACTERIAL VAGINOSIS: Primary | ICD-10-CM

## 2025-01-16 DIAGNOSIS — N76.0 BACTERIAL VAGINOSIS: Primary | ICD-10-CM

## 2025-01-16 RX ORDER — METRONIDAZOLE 7.5 MG/G
GEL VAGINAL
Qty: 70 G | Refills: 1 | Status: SHIPPED | OUTPATIENT
Start: 2025-01-16

## 2025-01-16 RX ORDER — METRONIDAZOLE 500 MG/1
500 TABLET ORAL 2 TIMES DAILY
Qty: 14 TABLET | Refills: 0 | Status: SHIPPED | OUTPATIENT
Start: 2025-01-16 | End: 2025-01-23

## 2025-01-16 NOTE — PROGRESS NOTES
"  Assessment & Plan   Assessment & Plan  Bacterial vaginosis  The patient has clear evidence of recurrent bacterial vaginosis.  We discussed treatment options and ultimately I recommended treating with a course of oral metronidazole followed by metronidazole gel twice weekly for the next 4 to 6 months.  Orders:    Wet prep - Clinic Collect    Chlamydia & Gonorrhea by PCR, [facility] - Clinic Collect    metroNIDAZOLE (FLAGYL) 500 MG tablet; Take 1 tablet (500 mg) by mouth 2 times daily for 7 days.    Wet prep - lab collect; Standing    metroNIDAZOLE (METROGEL) 0.75 % vaginal gel; Insert intravaginally one applicatorful twice weekly          Mely Bloom is a 44 year old who presents today with a concern regarding recurrent vaginosis.  The patient reports this week that she has had symptoms of vaginal discharge with odor as well as itching.  She has had new sexual partners since our last visit.  She does use a condom every time she is sexually active.  Discuss ongoing vaginosis/related to IUD??, itchy, and PT DECLINED ALL VITALS      1/16/2025     8:02 AM   Additional Questions   Roomed by as   Accompanied by self         1/16/2025     8:02 AM   Patient Reported Additional Medications   Patient reports taking the following new medications no     History of Present Illness       Reason for visit:  Check up    She eats 0-1 servings of fruits and vegetables daily.She consumes 0 sweetened beverage(s) daily.She exercises with enough effort to increase her heart rate 20 to 29 minutes per day.  She exercises with enough effort to increase her heart rate 5 days per week.   She is taking medications regularly.         Objective    Ht 1.702 m (5' 7\")   LMP 01/02/2025 (Approximate)   BMI 23.81 kg/m    Body mass index is 23.81 kg/m .  Physical Exam   GENERAL: alert and no distress   (female): The patient has a mild to moderate amount of abnormal vaginal discharge present.    Lab on 12/17/2024   Component Date Value " Ref Range Status    Trichomonas 12/17/2024 Absent  Absent Final    Yeast 12/17/2024 Absent  Absent Final    Clue Cells 12/17/2024 Present (A)  Absent Final    WBCs/high power field 12/17/2024 3+ (A)  None Final       The longitudinal plan of care for the diagnosis(es)/condition(s) as documented were addressed during this visit. Due to the added complexity in care, I will continue to support Merle in the subsequent management and with ongoing continuity of care.        Signed Electronically by: SIRIA JACKSON MD

## 2025-01-28 NOTE — TELEPHONE ENCOUNTER
Controlled Substance Refill Request  Medication Name:   Requested Prescriptions     Pending Prescriptions Disp Refills     ALPRAZolam (XANAX) 1 MG tablet [Pharmacy Med Name: ALPRAZolam 1 MG Oral Tablet] 30 tablet 0     Sig: TAKE 1 TABLET BY MOUTH ONCE DAILY AS NEEDED FOR ANXIETY DO  NOT  EXCEED  7  TABLETS  PER  WEEK     Date Last Fill: 1/27/2021  Requested Pharmacy: [facility]  Submit electronically to pharmacy  Controlled Substance Agreement on file:   Encounter-Level CSA Scan Date:    There are no encounter-level csa scan date.        Last office visit:  2/26/20       normal balance

## 2025-01-29 ENCOUNTER — MYC MEDICAL ADVICE (OUTPATIENT)
Dept: FAMILY MEDICINE | Facility: CLINIC | Age: 45
End: 2025-01-29
Payer: COMMERCIAL

## 2025-01-29 DIAGNOSIS — F90.2 ADHD (ATTENTION DEFICIT HYPERACTIVITY DISORDER), COMBINED TYPE: Primary | ICD-10-CM

## 2025-02-01 DIAGNOSIS — N76.0 BACTERIAL VAGINOSIS: ICD-10-CM

## 2025-02-01 DIAGNOSIS — F41.1 GENERALIZED ANXIETY DISORDER: ICD-10-CM

## 2025-02-01 DIAGNOSIS — B96.89 BACTERIAL VAGINOSIS: ICD-10-CM

## 2025-02-03 RX ORDER — METRONIDAZOLE 500 MG/1
500 TABLET ORAL 2 TIMES DAILY
Qty: 14 TABLET | Refills: 0 | OUTPATIENT
Start: 2025-02-03 | End: 2025-02-10

## 2025-02-03 RX ORDER — ALPRAZOLAM 1 MG/1
TABLET ORAL
Qty: 34 TABLET | Refills: 0 | Status: SHIPPED | OUTPATIENT
Start: 2025-02-03 | End: 2025-02-04

## 2025-02-03 RX ORDER — DEXTROAMPHETAMINE SACCHARATE, AMPHETAMINE ASPARTATE, DEXTROAMPHETAMINE SULFATE AND AMPHETAMINE SULFATE 7.5; 7.5; 7.5; 7.5 MG/1; MG/1; MG/1; MG/1
30 TABLET ORAL DAILY
Qty: 30 TABLET | Refills: 0 | Status: SHIPPED | OUTPATIENT
Start: 2025-02-03

## 2025-02-03 NOTE — TELEPHONE ENCOUNTER
Please contact patient.  We had a discussion at her last visit regarding a plan to treat recurrent bacterial vaginosis including use of metronidazole gel.  However, a request came through for a course of oral metronidazole again.  Please provide context and some symptom information please

## 2025-02-04 ENCOUNTER — TELEPHONE (OUTPATIENT)
Dept: FAMILY MEDICINE | Facility: CLINIC | Age: 45
End: 2025-02-04
Payer: COMMERCIAL

## 2025-02-04 DIAGNOSIS — F41.1 GENERALIZED ANXIETY DISORDER: ICD-10-CM

## 2025-02-04 RX ORDER — ALPRAZOLAM 1 MG/1
TABLET ORAL
Qty: 34 TABLET | Refills: 0 | Status: SHIPPED | OUTPATIENT
Start: 2025-02-04

## 2025-02-04 NOTE — TELEPHONE ENCOUNTER
General Call    Contacts       Contact Date/Time Type Contact Phone/Fax    02/04/2025 08:51 AM CST Phone (Incoming) [facility] [address] [place], [place] [address] (Pharmacy) [phone]          Reason for Call:  Prescription Clarification    What are your questions or concerns:  Pharmacy requesting clarification for alprazolam. Sig indicates this is for flying anxiety. Pharmacy will need to document if patient is flying frequent enough to have monthly fills or prescription will need to be resent with adjusted sig. Please advise

## 2025-02-06 NOTE — TELEPHONE ENCOUNTER
Johana is requesting a refill of the oral form for Metronidazole but is symptoms free at this time as she is taking everything as prescribed.   She will take oral if needed for recurrent symptoms.

## 2025-02-06 NOTE — TELEPHONE ENCOUNTER
Left message to call back for: pt  Information to relay to patient: transfer to [facility] RN line to discuss providers message below

## 2025-02-14 ENCOUNTER — MYC REFILL (OUTPATIENT)
Dept: FAMILY MEDICINE | Facility: CLINIC | Age: 45
End: 2025-02-14
Payer: COMMERCIAL

## 2025-02-14 DIAGNOSIS — F90.2 ADHD (ATTENTION DEFICIT HYPERACTIVITY DISORDER), COMBINED TYPE: ICD-10-CM

## 2025-02-14 DIAGNOSIS — F51.01 PRIMARY INSOMNIA: ICD-10-CM

## 2025-02-17 RX ORDER — DEXTROAMPHETAMINE SACCHARATE, AMPHETAMINE ASPARTATE, DEXTROAMPHETAMINE SULFATE AND AMPHETAMINE SULFATE 3.75; 3.75; 3.75; 3.75 MG/1; MG/1; MG/1; MG/1
15 TABLET ORAL DAILY
Qty: 30 TABLET | Refills: 0 | Status: SHIPPED | OUTPATIENT
Start: 2025-02-17

## 2025-02-17 RX ORDER — ZOLPIDEM TARTRATE 5 MG/1
5 TABLET ORAL
Qty: 15 TABLET | Refills: 0 | Status: SHIPPED | OUTPATIENT
Start: 2025-02-17

## 2025-03-01 ENCOUNTER — MYC REFILL (OUTPATIENT)
Dept: FAMILY MEDICINE | Facility: CLINIC | Age: 45
End: 2025-03-01
Payer: COMMERCIAL

## 2025-03-01 DIAGNOSIS — F51.01 PRIMARY INSOMNIA: ICD-10-CM

## 2025-03-01 DIAGNOSIS — F41.1 GENERALIZED ANXIETY DISORDER: ICD-10-CM

## 2025-03-01 DIAGNOSIS — F90.2 ADHD (ATTENTION DEFICIT HYPERACTIVITY DISORDER), COMBINED TYPE: ICD-10-CM

## 2025-03-03 DIAGNOSIS — F41.1 GENERALIZED ANXIETY DISORDER: ICD-10-CM

## 2025-03-03 RX ORDER — ALPRAZOLAM 1 MG/1
TABLET ORAL
Qty: 34 TABLET | Refills: 0 | Status: SHIPPED | OUTPATIENT
Start: 2025-03-03

## 2025-03-03 RX ORDER — ZOLPIDEM TARTRATE 5 MG/1
5 TABLET ORAL
Qty: 15 TABLET | Refills: 0 | Status: SHIPPED | OUTPATIENT
Start: 2025-03-03

## 2025-03-03 RX ORDER — DEXTROAMPHETAMINE SACCHARATE, AMPHETAMINE ASPARTATE, DEXTROAMPHETAMINE SULFATE AND AMPHETAMINE SULFATE 3.75; 3.75; 3.75; 3.75 MG/1; MG/1; MG/1; MG/1
15 TABLET ORAL DAILY
Qty: 30 TABLET | Refills: 0 | Status: SHIPPED | OUTPATIENT
Start: 2025-03-03

## 2025-03-03 RX ORDER — ALPRAZOLAM 1 MG/1
TABLET ORAL
Qty: 34 TABLET | Refills: 0 | Status: SHIPPED | OUTPATIENT
Start: 2025-03-03 | End: 2025-03-03

## 2025-03-03 RX ORDER — DEXTROAMPHETAMINE SACCHARATE, AMPHETAMINE ASPARTATE, DEXTROAMPHETAMINE SULFATE AND AMPHETAMINE SULFATE 7.5; 7.5; 7.5; 7.5 MG/1; MG/1; MG/1; MG/1
30 TABLET ORAL DAILY
Qty: 30 TABLET | Refills: 0 | Status: SHIPPED | OUTPATIENT
Start: 2025-03-03

## 2025-03-28 ENCOUNTER — MYC REFILL (OUTPATIENT)
Dept: FAMILY MEDICINE | Facility: CLINIC | Age: 45
End: 2025-03-28
Payer: COMMERCIAL

## 2025-03-28 DIAGNOSIS — F90.2 ADHD (ATTENTION DEFICIT HYPERACTIVITY DISORDER), COMBINED TYPE: ICD-10-CM

## 2025-03-30 RX ORDER — DEXTROAMPHETAMINE SACCHARATE, AMPHETAMINE ASPARTATE, DEXTROAMPHETAMINE SULFATE AND AMPHETAMINE SULFATE 7.5; 7.5; 7.5; 7.5 MG/1; MG/1; MG/1; MG/1
30 TABLET ORAL DAILY
Qty: 30 TABLET | Refills: 0 | Status: SHIPPED | OUTPATIENT
Start: 2025-03-30

## 2025-03-31 ENCOUNTER — MYC MEDICAL ADVICE (OUTPATIENT)
Dept: FAMILY MEDICINE | Facility: CLINIC | Age: 45
End: 2025-03-31
Payer: COMMERCIAL

## 2025-03-31 DIAGNOSIS — F41.1 GENERALIZED ANXIETY DISORDER: Primary | ICD-10-CM

## 2025-04-01 RX ORDER — VENLAFAXINE HYDROCHLORIDE 37.5 MG/1
37.5 CAPSULE, EXTENDED RELEASE ORAL DAILY
Qty: 90 CAPSULE | Refills: 1 | Status: SHIPPED | OUTPATIENT
Start: 2025-04-01

## 2025-04-16 DIAGNOSIS — B96.89 BACTERIAL VAGINOSIS: ICD-10-CM

## 2025-04-16 DIAGNOSIS — N76.0 BACTERIAL VAGINOSIS: ICD-10-CM

## 2025-04-16 RX ORDER — METRONIDAZOLE 7.5 MG/G
GEL VAGINAL
Qty: 70 G | Refills: 0 | Status: SHIPPED | OUTPATIENT
Start: 2025-04-16

## 2025-04-21 DIAGNOSIS — F51.01 PRIMARY INSOMNIA: ICD-10-CM

## 2025-04-21 DIAGNOSIS — F41.1 GENERALIZED ANXIETY DISORDER: ICD-10-CM

## 2025-04-21 RX ORDER — ALPRAZOLAM 1 MG/1
TABLET ORAL
Qty: 34 TABLET | Refills: 0 | Status: SHIPPED | OUTPATIENT
Start: 2025-04-21

## 2025-04-21 RX ORDER — ZOLPIDEM TARTRATE 5 MG/1
5 TABLET ORAL
Qty: 15 TABLET | Refills: 0 | Status: SHIPPED | OUTPATIENT
Start: 2025-04-21

## 2025-05-18 DIAGNOSIS — F41.1 GENERALIZED ANXIETY DISORDER: ICD-10-CM

## 2025-05-19 ENCOUNTER — MYC REFILL (OUTPATIENT)
Dept: FAMILY MEDICINE | Facility: CLINIC | Age: 45
End: 2025-05-19
Payer: COMMERCIAL

## 2025-05-19 DIAGNOSIS — F51.01 PRIMARY INSOMNIA: ICD-10-CM

## 2025-05-19 DIAGNOSIS — F41.1 GENERALIZED ANXIETY DISORDER: ICD-10-CM

## 2025-05-19 RX ORDER — ALPRAZOLAM 1 MG/1
TABLET ORAL
Qty: 34 TABLET | Refills: 0 | OUTPATIENT
Start: 2025-05-19

## 2025-05-19 RX ORDER — ZOLPIDEM TARTRATE 5 MG/1
5 TABLET ORAL
Qty: 15 TABLET | Refills: 0 | Status: SHIPPED | OUTPATIENT
Start: 2025-05-19

## 2025-05-19 RX ORDER — ALPRAZOLAM 1 MG/1
TABLET ORAL
Qty: 34 TABLET | Refills: 0 | Status: SHIPPED | OUTPATIENT
Start: 2025-05-19

## 2025-05-28 ENCOUNTER — MYC REFILL (OUTPATIENT)
Dept: FAMILY MEDICINE | Facility: CLINIC | Age: 45
End: 2025-05-28
Payer: COMMERCIAL

## 2025-05-28 DIAGNOSIS — B96.89 BACTERIAL VAGINOSIS: ICD-10-CM

## 2025-05-28 DIAGNOSIS — F41.1 GENERALIZED ANXIETY DISORDER: ICD-10-CM

## 2025-05-28 DIAGNOSIS — N76.0 BACTERIAL VAGINOSIS: ICD-10-CM

## 2025-05-28 DIAGNOSIS — F90.2 ADHD (ATTENTION DEFICIT HYPERACTIVITY DISORDER), COMBINED TYPE: ICD-10-CM

## 2025-05-28 RX ORDER — METRONIDAZOLE 7.5 MG/G
GEL VAGINAL
Qty: 70 G | Refills: 0 | Status: SHIPPED | OUTPATIENT
Start: 2025-05-28

## 2025-05-28 RX ORDER — VENLAFAXINE HYDROCHLORIDE 75 MG/1
75 CAPSULE, EXTENDED RELEASE ORAL DAILY
Qty: 90 CAPSULE | Refills: 3 | OUTPATIENT
Start: 2025-05-28

## 2025-05-28 RX ORDER — DEXTROAMPHETAMINE SACCHARATE, AMPHETAMINE ASPARTATE, DEXTROAMPHETAMINE SULFATE AND AMPHETAMINE SULFATE 3.75; 3.75; 3.75; 3.75 MG/1; MG/1; MG/1; MG/1
15 TABLET ORAL DAILY
Qty: 30 TABLET | Refills: 0 | Status: SHIPPED | OUTPATIENT
Start: 2025-05-28

## 2025-05-28 RX ORDER — VENLAFAXINE HYDROCHLORIDE 37.5 MG/1
37.5 CAPSULE, EXTENDED RELEASE ORAL DAILY
Qty: 90 CAPSULE | Refills: 1 | OUTPATIENT
Start: 2025-05-28

## 2025-05-28 RX ORDER — DEXTROAMPHETAMINE SACCHARATE, AMPHETAMINE ASPARTATE, DEXTROAMPHETAMINE SULFATE AND AMPHETAMINE SULFATE 7.5; 7.5; 7.5; 7.5 MG/1; MG/1; MG/1; MG/1
30 TABLET ORAL DAILY
Qty: 30 TABLET | Refills: 0 | Status: SHIPPED | OUTPATIENT
Start: 2025-05-28

## 2025-06-22 ENCOUNTER — MYC REFILL (OUTPATIENT)
Dept: FAMILY MEDICINE | Facility: CLINIC | Age: 45
End: 2025-06-22
Payer: COMMERCIAL

## 2025-06-22 DIAGNOSIS — F41.1 GENERALIZED ANXIETY DISORDER: ICD-10-CM

## 2025-06-22 DIAGNOSIS — F51.01 PRIMARY INSOMNIA: ICD-10-CM

## 2025-06-22 DIAGNOSIS — F90.2 ADHD (ATTENTION DEFICIT HYPERACTIVITY DISORDER), COMBINED TYPE: ICD-10-CM

## 2025-06-23 RX ORDER — DEXTROAMPHETAMINE SACCHARATE, AMPHETAMINE ASPARTATE, DEXTROAMPHETAMINE SULFATE AND AMPHETAMINE SULFATE 7.5; 7.5; 7.5; 7.5 MG/1; MG/1; MG/1; MG/1
30 TABLET ORAL DAILY
Qty: 30 TABLET | Refills: 0 | Status: SHIPPED | OUTPATIENT
Start: 2025-06-23

## 2025-06-23 RX ORDER — ALPRAZOLAM 1 MG/1
0.5 TABLET ORAL
Qty: 34 TABLET | Refills: 0 | Status: SHIPPED | OUTPATIENT
Start: 2025-06-23

## 2025-06-23 RX ORDER — DEXTROAMPHETAMINE SACCHARATE, AMPHETAMINE ASPARTATE, DEXTROAMPHETAMINE SULFATE AND AMPHETAMINE SULFATE 3.75; 3.75; 3.75; 3.75 MG/1; MG/1; MG/1; MG/1
15 TABLET ORAL DAILY
Qty: 30 TABLET | Refills: 0 | Status: SHIPPED | OUTPATIENT
Start: 2025-06-23

## 2025-06-23 RX ORDER — ZOLPIDEM TARTRATE 5 MG/1
5 TABLET ORAL
Qty: 15 TABLET | Refills: 0 | Status: SHIPPED | OUTPATIENT
Start: 2025-06-23

## 2025-07-08 ENCOUNTER — TELEPHONE (OUTPATIENT)
Dept: FAMILY MEDICINE | Facility: CLINIC | Age: 45
End: 2025-07-08

## 2025-07-08 NOTE — TELEPHONE ENCOUNTER
Left message to call back for: Merle  Information to relay to patient: Please notify patient she can get into [facility]V and do questions and once I see her in the virtual lobby I will check her in and contact her for her 140 VV with [facility].            Thank you.  Arti KINGSTON CMA

## 2025-07-08 NOTE — TELEPHONE ENCOUNTER
Left message to call back for: pt  Information to relay to patient: patient was inappropriately double booked on [facility] CNP schedule today at 1:40pm. Please inquire if patient would be ok switching VV appointment time to 3:20pm.     If this is not feasible, OK to keep as scheduled.

## 2025-07-10 PROBLEM — I49.3 FREQUENT PVCS: Status: ACTIVE | Noted: 2025-07-10

## 2025-07-10 PROBLEM — I42.8 NONISCHEMIC CARDIOMYOPATHY (H): Status: ACTIVE | Noted: 2025-07-01

## 2025-07-10 PROBLEM — I50.21 ACUTE SYSTOLIC CONGESTIVE HEART FAILURE (H): Status: ACTIVE | Noted: 2025-07-01

## 2025-07-10 PROBLEM — I27.20 PULMONARY HYPERTENSION (H): Status: ACTIVE | Noted: 2025-07-10

## 2025-07-10 NOTE — ASSESSMENT & PLAN NOTE
Refer to assessment and plan under acute CHF.  She is currently off of Adderall as recommended by cardiology.  She has noticed that she has more difficulty with concentration.  She is to remain off the Adderall for 6 to 12 months.

## 2025-07-10 NOTE — ASSESSMENT & PLAN NOTE
She was experiencing frequent PVCs during her hospitalization which may be contributing to her cardiomyopathy.  EP cardiology recommended starting low-dose beta-blocker and perform event monitor on discharge.      She states the plan is to apply event monitor on 7/25/25 per cardiology.

## 2025-07-10 NOTE — ASSESSMENT & PLAN NOTE
Recent hospitalization at [facility] for acute heart failure and nonischemic cardiomyopathy.  Adderall may be playing a role in frequent PVCs.  During her hospitalization was recommended that she stop Adderall for 6 months to 1 year.    She was discharged on Jardiance, Lasix, losartan, metoprolol and spironolactone.  She remains off of adderall. She notes her weight is up 4 pounds this week.  She has contacted cardiology regarding weight gain. She has had one episode of shortness of breath yesterday evening after picking up groceries.  No chest pain.  She has noticed increased cough.  No edema in lower extremities.  Follow up appointment 7/25/25 with Cardiology.  Will wait recommendations.     I have advised her to take an extra dose of Lasix today.   She is to avoid salt.  Continue daily weights.  Will check BMP and BNP today.     Follow up with Dr. Fernandez in 2 weeks.    Transthoracic echocardiogram    1. Sinus tachycardia during study.    2. Severely enlarged left ventricular chamber size; severely reduced ejection fraction (10-15% by visual estimate); severe generalized hypokinesis.    3. Severely reduced cardiac index (1.7 L/min/m2).    4. No LV apical thrombus visualized after the administration of contrast.    5. Severely enlarged right ventricular chamber size with moderately reduced systolic function.    6. Mild-moderate mitral valve regurgitation.    7. Enlarged inferior vena cava size with normal inspiratory collapse (>50%) consistent with intermediate/high-normal central venous pressures.    8. No pericardial effusion.    9. Moderate left pleural effusion.    10. A prior study is not available for comparison.    11. Pertinent findings communicated to the inpatient ordering provider via secure messaging.     Cardiac MRI    1. Cardiac MR consistent with a nonischemic dilated cardiomyopathy (in conjunction with angiographically normal coronary arteries on invasive coronary angiogram from 07/01/2025).    2.  Moderate-severe left ventricular chamber enlargement; severely reduced ejection fraction (calculated 26%); and severe generalized hypokinesis.    3. Cardiac index 2.7 L/min/m2 (on dobutamine 2.5 mcg/kg/min).    4. No left ventricular thrombus.    5. Normal right ventricular chamber size with moderately reduced systolic function (calculated ejection fraction 32%).    6. No abnormal late gadolinium enhancement (of either the left or right ventricular myocardium).    7. Trivial pericardial effusion (without cardiac MR features of pericarditis).    8. Large right and moderate left dependent pleural effusions.    9. No prior cardiac MR studies.     Cor angio:     1. Normal coronary arteries.    2. Elevated biventricular/pulmonary filling pressures - RA 12, RV 45/18, PA 46/25/33, PCWP 20, and LVEDP 30 mm Hg.    3. Reduced cardiac output/index and elevated SVR consistent with cardiogenic shock (CO 3.52 L/minute, CI 1.91 L/minute/m2, and SVR 2410).    4. Beatris is 1.75.

## 2025-07-11 DIAGNOSIS — F51.01 PRIMARY INSOMNIA: ICD-10-CM

## 2025-07-13 RX ORDER — ZOLPIDEM TARTRATE 5 MG/1
5 TABLET ORAL
Qty: 15 TABLET | Refills: 2 | Status: SHIPPED | OUTPATIENT
Start: 2025-07-13

## 2025-07-16 ENCOUNTER — OFFICE VISIT (OUTPATIENT)
Dept: FAMILY MEDICINE | Facility: CLINIC | Age: 45
End: 2025-07-16
Payer: COMMERCIAL

## 2025-07-16 ENCOUNTER — TELEPHONE (OUTPATIENT)
Dept: FAMILY MEDICINE | Facility: CLINIC | Age: 45
End: 2025-07-16

## 2025-07-16 VITALS
DIASTOLIC BLOOD PRESSURE: 54 MMHG | RESPIRATION RATE: 18 BRPM | HEIGHT: 67 IN | BODY MASS INDEX: 24.48 KG/M2 | WEIGHT: 156 LBS | HEART RATE: 81 BPM | SYSTOLIC BLOOD PRESSURE: 126 MMHG | OXYGEN SATURATION: 100 %

## 2025-07-16 DIAGNOSIS — F41.1 GENERALIZED ANXIETY DISORDER: ICD-10-CM

## 2025-07-16 DIAGNOSIS — I42.8 NONISCHEMIC CARDIOMYOPATHY (H): Primary | ICD-10-CM

## 2025-07-16 DIAGNOSIS — F90.2 ADHD (ATTENTION DEFICIT HYPERACTIVITY DISORDER), COMBINED TYPE: ICD-10-CM

## 2025-07-16 DIAGNOSIS — I27.20 PULMONARY HYPERTENSION (H): ICD-10-CM

## 2025-07-16 DIAGNOSIS — I50.21 ACUTE SYSTOLIC CONGESTIVE HEART FAILURE (H): ICD-10-CM

## 2025-07-16 DIAGNOSIS — I49.3 FREQUENT PVCS: ICD-10-CM

## 2025-07-16 LAB
ANION GAP SERPL CALCULATED.3IONS-SCNC: 8 MMOL/L (ref 7–15)
BUN SERPL-MCNC: 17.7 MG/DL (ref 6–20)
CALCIUM SERPL-MCNC: 9.3 MG/DL (ref 8.8–10.4)
CHLORIDE SERPL-SCNC: 101 MMOL/L (ref 98–107)
CREAT SERPL-MCNC: 0.94 MG/DL (ref 0.51–0.95)
EGFRCR SERPLBLD CKD-EPI 2021: 75 ML/MIN/1.73M2
ERYTHROCYTE [DISTWIDTH] IN BLOOD BY AUTOMATED COUNT: 13.4 % (ref 10–15)
GLUCOSE SERPL-MCNC: 94 MG/DL (ref 70–99)
HCO3 SERPL-SCNC: 26 MMOL/L (ref 22–29)
HCT VFR BLD AUTO: 42.6 % (ref 35–47)
HGB BLD-MCNC: 13.7 G/DL (ref 11.7–15.7)
MCH RBC QN AUTO: 31.5 PG (ref 26.5–33)
MCHC RBC AUTO-ENTMCNC: 32.2 G/DL (ref 31.5–36.5)
MCV RBC AUTO: 98 FL (ref 78–100)
NT-PROBNP SERPL-MCNC: 1680 PG/ML (ref 0–192)
PLATELET # BLD AUTO: 281 10E3/UL (ref 150–450)
POTASSIUM SERPL-SCNC: 3.9 MMOL/L (ref 3.4–5.3)
RBC # BLD AUTO: 4.35 10E6/UL (ref 3.8–5.2)
SODIUM SERPL-SCNC: 135 MMOL/L (ref 135–145)
WBC # BLD AUTO: 7 10E3/UL (ref 4–11)

## 2025-07-16 PROCEDURE — 85027 COMPLETE CBC AUTOMATED: CPT | Performed by: PHYSICIAN ASSISTANT

## 2025-07-16 PROCEDURE — 80048 BASIC METABOLIC PNL TOTAL CA: CPT | Performed by: PHYSICIAN ASSISTANT

## 2025-07-16 PROCEDURE — 99214 OFFICE O/P EST MOD 30 MIN: CPT | Performed by: PHYSICIAN ASSISTANT

## 2025-07-16 PROCEDURE — G2211 COMPLEX E/M VISIT ADD ON: HCPCS | Performed by: PHYSICIAN ASSISTANT

## 2025-07-16 PROCEDURE — 36415 COLL VENOUS BLD VENIPUNCTURE: CPT | Performed by: PHYSICIAN ASSISTANT

## 2025-07-16 PROCEDURE — 83880 ASSAY OF NATRIURETIC PEPTIDE: CPT | Performed by: PHYSICIAN ASSISTANT

## 2025-07-16 RX ORDER — ALBUTEROL SULFATE 90 UG/1
AEROSOL, METERED RESPIRATORY (INHALATION)
COMMUNITY
Start: 2025-06-18

## 2025-07-16 RX ORDER — FUROSEMIDE 20 MG/1
20 TABLET ORAL DAILY
Qty: 30 TABLET | Refills: 1 | Status: SHIPPED | OUTPATIENT
Start: 2025-07-16

## 2025-07-16 RX ORDER — METOPROLOL SUCCINATE 25 MG/1
25 TABLET, EXTENDED RELEASE ORAL DAILY
COMMUNITY
Start: 2025-07-06 | End: 2026-07-06

## 2025-07-16 RX ORDER — FUROSEMIDE 20 MG/1
20 TABLET ORAL DAILY
COMMUNITY
Start: 2025-07-06 | End: 2025-07-16

## 2025-07-16 RX ORDER — LOSARTAN POTASSIUM 25 MG/1
50 TABLET ORAL DAILY
COMMUNITY
Start: 2025-07-11 | End: 2026-07-11

## 2025-07-16 RX ORDER — SPIRONOLACTONE 25 MG/1
25 TABLET ORAL DAILY
COMMUNITY
Start: 2025-07-06 | End: 2026-07-06

## 2025-07-16 RX ORDER — ALPRAZOLAM 1 MG/1
0.5 TABLET ORAL
Qty: 34 TABLET | Refills: 0 | OUTPATIENT
Start: 2025-07-16

## 2025-07-16 RX ORDER — METOPROLOL SUCCINATE 50 MG/1
50 TABLET, EXTENDED RELEASE ORAL DAILY
COMMUNITY

## 2025-07-16 ASSESSMENT — PATIENT HEALTH QUESTIONNAIRE - PHQ9
SUM OF ALL RESPONSES TO PHQ QUESTIONS 1-9: 12
10. IF YOU CHECKED OFF ANY PROBLEMS, HOW DIFFICULT HAVE THESE PROBLEMS MADE IT FOR YOU TO DO YOUR WORK, TAKE CARE OF THINGS AT HOME, OR GET ALONG WITH OTHER PEOPLE: SOMEWHAT DIFFICULT
SUM OF ALL RESPONSES TO PHQ QUESTIONS 1-9: 12

## 2025-07-16 ASSESSMENT — ANXIETY QUESTIONNAIRES
4. TROUBLE RELAXING: NEARLY EVERY DAY
7. FEELING AFRAID AS IF SOMETHING AWFUL MIGHT HAPPEN: NEARLY EVERY DAY
8. IF YOU CHECKED OFF ANY PROBLEMS, HOW DIFFICULT HAVE THESE MADE IT FOR YOU TO DO YOUR WORK, TAKE CARE OF THINGS AT HOME, OR GET ALONG WITH OTHER PEOPLE?: EXTREMELY DIFFICULT
1. FEELING NERVOUS, ANXIOUS, OR ON EDGE: MORE THAN HALF THE DAYS
GAD7 TOTAL SCORE: 18
5. BEING SO RESTLESS THAT IT IS HARD TO SIT STILL: NEARLY EVERY DAY
IF YOU CHECKED OFF ANY PROBLEMS ON THIS QUESTIONNAIRE, HOW DIFFICULT HAVE THESE PROBLEMS MADE IT FOR YOU TO DO YOUR WORK, TAKE CARE OF THINGS AT HOME, OR GET ALONG WITH OTHER PEOPLE: EXTREMELY DIFFICULT
6. BECOMING EASILY ANNOYED OR IRRITABLE: NEARLY EVERY DAY
3. WORRYING TOO MUCH ABOUT DIFFERENT THINGS: MORE THAN HALF THE DAYS
2. NOT BEING ABLE TO STOP OR CONTROL WORRYING: MORE THAN HALF THE DAYS
7. FEELING AFRAID AS IF SOMETHING AWFUL MIGHT HAPPEN: NEARLY EVERY DAY

## 2025-07-16 NOTE — ASSESSMENT & PLAN NOTE
Recent hospitalization at [facility] for acute heart failure and nonischemic cardiomyopathy.  Adderall may be playing a role in frequent PVCs.  During her hospitalization was recommended that she stop Adderall for 6 months to 1 year.    She was discharged on Jardiance, Lasix, losartan, metoprolol and spironolactone.  She remains off of adderall. She notes her weight is up 4 pounds this week.  She has contacted cardiology regarding weight gain. She has had one episode of shortness of breath yesterday evening after picking up groceries.  No chest pain.  She has noticed increased cough.  No edema in lower extremities.  Follow up appointment 7/25/25 with Cardiology.  Will wait recommendations.     Vital signs are stable.  No edema in lower extremities today.  Lung sounds are clear.  I have advised her to take an extra dose of Lasix today.   She is to avoid salt.  Continue daily weights.  Will check BMP and BNP today.     Follow up with Dr. Fernandez in 2 weeks.    Transthoracic echocardiogram    1. Sinus tachycardia during study.    2. Severely enlarged left ventricular chamber size; severely reduced ejection fraction (10-15% by visual estimate); severe generalized hypokinesis.    3. Severely reduced cardiac index (1.7 L/min/m2).    4. No LV apical thrombus visualized after the administration of contrast.    5. Severely enlarged right ventricular chamber size with moderately reduced systolic function.    6. Mild-moderate mitral valve regurgitation.    7. Enlarged inferior vena cava size with normal inspiratory collapse (>50%) consistent with intermediate/high-normal central venous pressures.    8. No pericardial effusion.    9. Moderate left pleural effusion.    10. A prior study is not available for comparison.    11. Pertinent findings communicated to the inpatient ordering provider via secure messaging.     Cardiac MRI    1. Cardiac MR consistent with a nonischemic dilated cardiomyopathy (in conjunction with  angiographically normal coronary arteries on invasive coronary angiogram from 07/01/2025).    2. Moderate-severe left ventricular chamber enlargement; severely reduced ejection fraction (calculated 26%); and severe generalized hypokinesis.    3. Cardiac index 2.7 L/min/m2 (on dobutamine 2.5 mcg/kg/min).    4. No left ventricular thrombus.    5. Normal right ventricular chamber size with moderately reduced systolic function (calculated ejection fraction 32%).    6. No abnormal late gadolinium enhancement (of either the left or right ventricular myocardium).    7. Trivial pericardial effusion (without cardiac MR features of pericarditis).    8. Large right and moderate left dependent pleural effusions.    9. No prior cardiac MR studies.     Cor angio:     1. Normal coronary arteries.    2. Elevated biventricular/pulmonary filling pressures - RA 12, RV 45/18, PA 46/25/33, PCWP 20, and LVEDP 30 mm Hg.    3. Reduced cardiac output/index and elevated SVR consistent with cardiogenic shock (CO 3.52 L/minute, CI 1.91 L/minute/m2, and SVR 2410).    4. Beatris is 1.75.

## 2025-07-16 NOTE — ASSESSMENT & PLAN NOTE
She is currently on effexor XR 37.5 mg daily for the past 3 days.  She feels more settled mentally.  She will contact us if she wants to increase dose.

## 2025-07-16 NOTE — TELEPHONE ENCOUNTER
Outgoing call to patient. She said that she has a few weeks left of the Xanax.  And does not need a  refill.     Patient said she is awaiting call back from cardiology regarding the lasix. She saw santiago today.     I huddled with Santiago gregg to review recommendations from the visit. See below. Reviewed recommendation with patient. She needed help identifying the lasix pill since she uses a pill organizer. We were able to identify the lasix      OV note today.     Layne ZAYAS RN

## 2025-07-16 NOTE — PROGRESS NOTES
The longitudinal plan of care for the diagnosis(es)/condition(s) as documented were addressed during this visit. Due to the added complexity in care, I will continue to support Merle in the subsequent management and with ongoing continuity of care.    Assessment & Plan   Problem List Items Addressed This Visit       Generalized anxiety disorder    She is currently on effexor XR 37.5 mg daily for the past 3 days.  She feels more settled mentally.  She will contact us if she wants to increase dose.           ADHD (attention deficit hyperactivity disorder), combined type    Refer to assessment and plan under acute CHF.  She is currently off of Adderall as recommended by cardiology.  She has noticed that she has more difficulty with concentration.  She is to remain off the Adderall for 6 to 12 months.         Acute systolic congestive heart failure (H)    Recent hospitalization at [facility] for acute heart failure and nonischemic cardiomyopathy.  Adderall may be playing a role in frequent PVCs.  During her hospitalization was recommended that she stop Adderall for 6 months to 1 year.    She was discharged on Jardiance, Lasix, losartan, metoprolol and spironolactone.  She remains off of adderall. She notes her weight is up 4 pounds this week.  She has contacted cardiology regarding weight gain. She has had one episode of shortness of breath yesterday evening after picking up groceries.  No chest pain.  She has noticed increased cough.  No edema in lower extremities.  Follow up appointment 7/25/25 with Cardiology.  Will wait recommendations.     Vital signs are stable.  No edema in lower extremities today.  Lung sounds are clear.  I have advised her to take an extra dose of Lasix today.   She is to avoid salt.  Continue daily weights.  Will check BMP and BNP today.     Follow up with Dr. Fernandez in 2 weeks.    Transthoracic echocardiogram    1. Sinus tachycardia during study.    2. Severely enlarged left ventricular  chamber size; severely reduced ejection fraction (10-15% by visual estimate); severe generalized hypokinesis.    3. Severely reduced cardiac index (1.7 L/min/m2).    4. No LV apical thrombus visualized after the administration of contrast.    5. Severely enlarged right ventricular chamber size with moderately reduced systolic function.    6. Mild-moderate mitral valve regurgitation.    7. Enlarged inferior vena cava size with normal inspiratory collapse (>50%) consistent with intermediate/high-normal central venous pressures.    8. No pericardial effusion.    9. Moderate left pleural effusion.    10. A prior study is not available for comparison.    11. Pertinent findings communicated to the inpatient ordering provider via secure messaging.     Cardiac MRI    1. Cardiac MR consistent with a nonischemic dilated cardiomyopathy (in conjunction with angiographically normal coronary arteries on invasive coronary angiogram from 07/01/2025).    2. Moderate-severe left ventricular chamber enlargement; severely reduced ejection fraction (calculated 26%); and severe generalized hypokinesis.    3. Cardiac index 2.7 L/min/m2 (on dobutamine 2.5 mcg/kg/min).    4. No left ventricular thrombus.    5. Normal right ventricular chamber size with moderately reduced systolic function (calculated ejection fraction 32%).    6. No abnormal late gadolinium enhancement (of either the left or right ventricular myocardium).    7. Trivial pericardial effusion (without cardiac MR features of pericarditis).    8. Large right and moderate left dependent pleural effusions.    9. No prior cardiac MR studies.     Cor angio:     1. Normal coronary arteries.    2. Elevated biventricular/pulmonary filling pressures - RA 12, RV 45/18, PA 46/25/33, PCWP 20, and LVEDP 30 mm Hg.    3. Reduced cardiac output/index and elevated SVR consistent with cardiogenic shock (CO 3.52 L/minute, CI 1.91 L/minute/m2, and SVR 2410).    4. Beatris is 1.75.         Relevant  Medications    furosemide (LASIX) 20 MG tablet    Other Relevant Orders    BASIC METABOLIC PANEL    CBC with Platelets (Completed)    NT-proBNP    Nonischemic cardiomyopathy (H) - Primary    Recent hospitalization at [facility] for acute heart failure and nonischemic cardiomyopathy.  Adderall may be playing a role in frequent PVCs.  During her hospitalization was recommended that she stop Adderall for 6 months to 1 year.    She was discharged on Jardiance, Lasix, losartan, metoprolol and spironolactone.  She remains off of adderall. She notes her weight is up 4 pounds this week.  She has contacted cardiology regarding weight gain. She has had one episode of shortness of breath yesterday evening after picking up groceries.  No chest pain.  She has noticed increased cough.  No edema in lower extremities.  Follow up appointment 7/25/25 with Cardiology.  Will wait recommendations.     I have advised her to take an extra dose of Lasix today.   She is to avoid salt.  Continue daily weights.  Will check BMP and BNP today.     Follow up with Dr. Fernandez in 2 weeks.    Transthoracic echocardiogram    1. Sinus tachycardia during study.    2. Severely enlarged left ventricular chamber size; severely reduced ejection fraction (10-15% by visual estimate); severe generalized hypokinesis.    3. Severely reduced cardiac index (1.7 L/min/m2).    4. No LV apical thrombus visualized after the administration of contrast.    5. Severely enlarged right ventricular chamber size with moderately reduced systolic function.    6. Mild-moderate mitral valve regurgitation.    7. Enlarged inferior vena cava size with normal inspiratory collapse (>50%) consistent with intermediate/high-normal central venous pressures.    8. No pericardial effusion.    9. Moderate left pleural effusion.    10. A prior study is not available for comparison.    11. Pertinent findings communicated to the inpatient ordering provider via secure messaging.     Cardiac MRI     1. Cardiac MR consistent with a nonischemic dilated cardiomyopathy (in conjunction with angiographically normal coronary arteries on invasive coronary angiogram from 07/01/2025).    2. Moderate-severe left ventricular chamber enlargement; severely reduced ejection fraction (calculated 26%); and severe generalized hypokinesis.    3. Cardiac index 2.7 L/min/m2 (on dobutamine 2.5 mcg/kg/min).    4. No left ventricular thrombus.    5. Normal right ventricular chamber size with moderately reduced systolic function (calculated ejection fraction 32%).    6. No abnormal late gadolinium enhancement (of either the left or right ventricular myocardium).    7. Trivial pericardial effusion (without cardiac MR features of pericarditis).    8. Large right and moderate left dependent pleural effusions.    9. No prior cardiac MR studies.     Cor angio:     1. Normal coronary arteries.    2. Elevated biventricular/pulmonary filling pressures - RA 12, RV 45/18, PA 46/25/33, PCWP 20, and LVEDP 30 mm Hg.    3. Reduced cardiac output/index and elevated SVR consistent with cardiogenic shock (CO 3.52 L/minute, CI 1.91 L/minute/m2, and SVR 2410).    4. Beatris is 1.75.         Frequent PVCs    She was experiencing frequent PVCs during her hospitalization which may be contributing to her cardiomyopathy.  EP cardiology recommended starting low-dose beta-blocker and perform event monitor on discharge.      She states the plan is to apply event monitor on 7/25/25 per cardiology.               Pulmonary hypertension (H)    Refer to assessment and plan under cardiomyopathy.           MED REC REQUIREDPost Medication Reconciliation Status:     Depression Screening Follow Up        7/16/2025     9:25 AM   PHQ   PHQ-9 Total Score 12    Q9: Thoughts of better off dead/self-harm past 2 weeks Not at all       Patient-reported           Follow Up Actions Taken  She feels effexor is effective.  No suicidal ideation.           Mely Bloom is a 45  "year old, presenting for the following health issues:  Follow Up (ED 07-01)        7/16/2025     9:24 AM   Additional Questions   Roomed by Daily Hughes         7/16/2025   Forms   Any forms needing to be completed Yes     HPI              Objective    /54 (BP Location: Left arm, Patient Position: Left side, Cuff Size: Adult Small)   Pulse 81   Resp 18   Ht 1.702 m (5' 7\")   Wt 70.8 kg (156 lb)   SpO2 100%   BMI 24.43 kg/m    Body mass index is 24.43 kg/m .  Physical Exam  Vitals and nursing note reviewed.   Constitutional:       Appearance: Normal appearance.   Cardiovascular:      Rate and Rhythm: Normal rate and regular rhythm.      Heart sounds: Normal heart sounds.      Comments: No edema in lower extremities.  Pulmonary:      Effort: Pulmonary effort is normal.      Breath sounds: Normal breath sounds.   Neurological:      Mental Status: She is alert.                Signed Electronically by: Jeimy Rushing PA-C    Answers submitted by the patient for this visit:  Patient Health Questionnaire (Submitted on 7/16/2025)  If you checked off any problems, how difficult have these problems made it for you to do your work, take care of things at home, or get along with other people?: Somewhat difficult  PHQ9 TOTAL SCORE: 12  Patient Health Questionnaire (G7) (Submitted on 7/16/2025)  ALPA 7 TOTAL SCORE: 18    "

## 2025-07-17 ENCOUNTER — RESULTS FOLLOW-UP (OUTPATIENT)
Dept: FAMILY MEDICINE | Facility: CLINIC | Age: 45
End: 2025-07-17
Payer: COMMERCIAL

## 2025-07-17 NOTE — TELEPHONE ENCOUNTER
Patient returning call. Providers message relayed to patient, she verbalized understanding. Patient reached out to cardiology yesterday, their only recommendation was one additional dose of lasix as Jeimy Rushing PA-C had suggested. Her weight this morning was 153#, down 1lb from yesterday. Sx are generally the same as yesterday although she notes more coughing.  Cardiology unable to accommodate patient today, writer recommended to patient that she be seen in ER. She is agreeable to go and plans to go straight to [facility].

## 2025-07-17 NOTE — TELEPHONE ENCOUNTER
Patient calling requesting call back from Venkata. She reports she spoke with  Cardiology who advised they can see her today and for her not to go to the ER. Patient would like to confirm with care team this is what she should do.

## 2025-07-21 ENCOUNTER — PATIENT OUTREACH (OUTPATIENT)
Dept: CARE COORDINATION | Facility: CLINIC | Age: 45
End: 2025-07-21
Payer: COMMERCIAL

## 2025-07-22 ENCOUNTER — MYC REFILL (OUTPATIENT)
Dept: FAMILY MEDICINE | Facility: CLINIC | Age: 45
End: 2025-07-22
Payer: COMMERCIAL

## 2025-07-22 RX ORDER — METOPROLOL SUCCINATE 25 MG/1
25 TABLET, EXTENDED RELEASE ORAL DAILY
Status: CANCELLED | OUTPATIENT
Start: 2025-07-22

## 2025-07-22 NOTE — TELEPHONE ENCOUNTER
Clinic RN: Please investigate patient's chart or contact patient if the information cannot be found because the medication is listed as historical or discontinued. Confirm patient is taking this medication. Document findings and route refill encounter to provider for approval or denial. See also her note    Venkata Dasilva RN on 7/22/2025 at 9:58 AM

## 2025-07-22 NOTE — TELEPHONE ENCOUNTER
Metoprolol is not the right medication that the patient is looking for. Has appointment with provider tomorrow.     Layne ZAYAS RN

## 2025-07-23 ENCOUNTER — OFFICE VISIT (OUTPATIENT)
Dept: FAMILY MEDICINE | Facility: CLINIC | Age: 45
End: 2025-07-23
Payer: COMMERCIAL

## 2025-07-23 VITALS
TEMPERATURE: 98.2 F | RESPIRATION RATE: 14 BRPM | OXYGEN SATURATION: 98 % | BODY MASS INDEX: 23.54 KG/M2 | HEART RATE: 32 BPM | HEIGHT: 67 IN | WEIGHT: 150 LBS

## 2025-07-23 DIAGNOSIS — E78.49 OTHER HYPERLIPIDEMIA: ICD-10-CM

## 2025-07-23 DIAGNOSIS — R00.1 BRADYCARDIA: ICD-10-CM

## 2025-07-23 DIAGNOSIS — L71.0 PERIORAL DERMATITIS: ICD-10-CM

## 2025-07-23 DIAGNOSIS — F99 INSOMNIA DUE TO OTHER MENTAL DISORDER: ICD-10-CM

## 2025-07-23 DIAGNOSIS — F41.1 GENERALIZED ANXIETY DISORDER: ICD-10-CM

## 2025-07-23 DIAGNOSIS — Z00.00 ROUTINE GENERAL MEDICAL EXAMINATION AT A HEALTH CARE FACILITY: Primary | ICD-10-CM

## 2025-07-23 DIAGNOSIS — I42.8 NONISCHEMIC CARDIOMYOPATHY (H): ICD-10-CM

## 2025-07-23 DIAGNOSIS — F51.05 INSOMNIA DUE TO OTHER MENTAL DISORDER: ICD-10-CM

## 2025-07-23 DIAGNOSIS — F90.2 ADHD (ATTENTION DEFICIT HYPERACTIVITY DISORDER), COMBINED TYPE: ICD-10-CM

## 2025-07-23 DIAGNOSIS — I50.21 ACUTE SYSTOLIC CONGESTIVE HEART FAILURE (H): ICD-10-CM

## 2025-07-23 DIAGNOSIS — I27.20 PULMONARY HYPERTENSION (H): ICD-10-CM

## 2025-07-23 LAB
ATRIAL RATE - MUSE: 81 BPM
DIASTOLIC BLOOD PRESSURE - MUSE: NORMAL MMHG
INTERPRETATION ECG - MUSE: NORMAL
P AXIS - MUSE: 73 DEGREES
PR INTERVAL - MUSE: 144 MS
QRS DURATION - MUSE: 92 MS
QT - MUSE: 366 MS
QTC - MUSE: 425 MS
R AXIS - MUSE: -62 DEGREES
SYSTOLIC BLOOD PRESSURE - MUSE: NORMAL MMHG
T AXIS - MUSE: 126 DEGREES
VENTRICULAR RATE- MUSE: 81 BPM

## 2025-07-23 PROCEDURE — 99396 PREV VISIT EST AGE 40-64: CPT | Performed by: FAMILY MEDICINE

## 2025-07-23 PROCEDURE — 99214 OFFICE O/P EST MOD 30 MIN: CPT | Mod: 25 | Performed by: FAMILY MEDICINE

## 2025-07-23 PROCEDURE — 93005 ELECTROCARDIOGRAM TRACING: CPT | Performed by: FAMILY MEDICINE

## 2025-07-23 SDOH — HEALTH STABILITY: PHYSICAL HEALTH: ON AVERAGE, HOW MANY DAYS PER WEEK DO YOU ENGAGE IN MODERATE TO STRENUOUS EXERCISE (LIKE A BRISK WALK)?: 1 DAY

## 2025-07-23 ASSESSMENT — SOCIAL DETERMINANTS OF HEALTH (SDOH): HOW OFTEN DO YOU GET TOGETHER WITH FRIENDS OR RELATIVES?: ONCE A WEEK

## 2025-07-23 NOTE — ASSESSMENT & PLAN NOTE
The patient continues to have a cough as well as shortness of breath with exertion related to her new diagnosis of cardiomyopathy and resulting systolic heart failure.  She is on a medication regimen currently which includes Lasix 20 mg, losartan 25 mg, metoprolol XL 50 mg, Jardiance 10mg as well as spironolactone.  She is following up closely with cardiology and will be starting cardiac rehab soon.

## 2025-07-23 NOTE — PATIENT INSTRUCTIONS
Patient Education   Preventive Care Advice   This is general advice given by our system to help you stay healthy. However, your care team may have specific advice just for you. Please talk to your care team about your preventive care needs.  Nutrition  Eat 5 or more servings of fruits and vegetables each day.  Try wheat bread, brown rice and whole grain pasta (instead of white bread, rice, and pasta).  Get enough calcium and vitamin D. Check the label on foods and aim for 100% of the RDA (recommended daily allowance).  Lifestyle  Exercise at least 150 minutes each week  (30 minutes a day, 5 days a week).  Do muscle strengthening activities 2 days a week. These help control your weight and prevent disease.  No smoking.  Wear sunscreen to prevent skin cancer.  Have a dental exam and cleaning every 6 months.  Yearly exams  See your health care team every year to talk about:  Any changes in your health.  Any medicines your care team has prescribed.  Preventive care, family planning, and ways to prevent chronic diseases.  Shots (vaccines)   HPV shots (up to age 26), if you've never had them before.  Hepatitis B shots (up to age 59), if you've never had them before.  COVID-19 shot: Get this shot when it's due.  Flu shot: Get a flu shot every year.  Tetanus shot: Get a tetanus shot every 10 years.  Pneumococcal, hepatitis A, and RSV shots: Ask your care team if you need these based on your risk.  Shingles shot (for age 50 and up)  General health tests  Diabetes screening:  Starting at age 35, Get screened for diabetes at least every 3 years.  If you are younger than age 35, ask your care team if you should be screened for diabetes.  Cholesterol test: At age 39, start having a cholesterol test every 5 years, or more often if advised.  Bone density scan (DEXA): At age 50, ask your care team if you should have this scan for osteoporosis (brittle bones).  Hepatitis C: Get tested at least once in your life.  STIs (sexually  transmitted infections)  Before age 24: Ask your care team if you should be screened for STIs.  After age 24: Get screened for STIs if you're at risk. You are at risk for STIs (including HIV) if:  You are sexually active with more than one person.  You don't use condoms every time.  You or a partner was diagnosed with a sexually transmitted infection.  If you are at risk for HIV, ask about PrEP medicine to prevent HIV.  Get tested for HIV at least once in your life, whether you are at risk for HIV or not.  Cancer screening tests  Cervical cancer screening: If you have a cervix, begin getting regular cervical cancer screening tests starting at age 21.  Breast cancer scan (mammogram): If you've ever had breasts, begin having regular mammograms starting at age 40. This is a scan to check for breast cancer.  Colon cancer screening: It is important to start screening for colon cancer at age 45.  Have a colonoscopy test every 10 years (or more often if you're at risk) Or, ask your provider about stool tests like a FIT test every year or Cologuard test every 3 years.  To learn more about your testing options, visit:   .  For help making a decision, visit:   https://bit.ly/bt89393.  Prostate cancer screening test: If you have a prostate, ask your care team if a prostate cancer screening test (PSA) at age 55 is right for you.  Lung cancer screening: If you are a current or former smoker ages 50 to 80, ask your care team if ongoing lung cancer screenings are right for you.  For informational purposes only. Not to replace the advice of your health care provider. Copyright   2023 [facility]. All rights reserved. Clinically reviewed by the [facility]. [employer] 236675 - REV 01/24.  Safer Sex: Care Instructions  Overview  Safer sex is a way to reduce your risk of getting a sexually transmitted infection (STI). It can also help prevent pregnancy.  Several products can help you practice  safer sex and reduce your chance of STIs. One of the best is a condom. There are internal and external condoms. You can use a special rubber sheet (dental dam) for protection during oral sex. Disposable gloves can keep your hands from touching blood, semen, or other body fluids that can carry infections.  Remember that birth control methods such as diaphragms, IUDs, foams, and birth control pills do not stop you from getting STIs.  Follow-up care is a key part of your treatment and safety. Be sure to make and go to all appointments, and call your doctor if you are having problems. It's also a good idea to know your test results and keep a list of the medicines you take.  How can you care for yourself at home?  Think about getting vaccinated to help prevent hepatitis A, hepatitis B, and human papillomavirus (HPV). They can be spread through sex.  Use a condom every time you have sex. Use an external condom, which goes on the penis. Or use an internal condom, which goes into the vagina or anus.  Make sure you use the right size external condom. A condom that's too small can break easily. A condom that's too big can slip off during sex.  Use a new condom each time you have sex. Be careful not to poke a hole in the condom when you open the wrapper.  Don't use an internal condom and an external condom at the same time.  Never use petroleum jelly (such as Vaseline), grease, hand lotion, baby oil, or anything with oil in it. These products can make holes in the condom.  After intercourse, hold the edge of the condom as you remove it. This will help keep semen from spilling out of the condom.  Do not have sex with anyone who has symptoms of an STI, such as sores on the genitals or mouth.  Do not drink a lot of alcohol or use drugs before sex.  Limit your sex partners. Sex with one partner who has sex only with you can reduce your risk of getting an STI.  Don't share sex toys. But if you do share them, use a condom and clean  "the sex toys between each use.  Talk to any partners before you have sex. Talk about what you feel comfortable with and whether you have any boundaries with sex. And find out if your partner or partners may be at risk for any STI. Keep in mind that a person may be able to spread an STI even if they do not have symptoms. You and any partners may want to get tested for STIs.  Where can you learn more?  Go to https://www.[employer].com.net/patiented  Enter B608 in the search box to learn more about \"Safer Sex: Care Instructions.\"  Current as of: April 30, 2024  Content Version: 14.5    5389-5538 [facility].   Care instructions adapted under license by your healthcare professional. If you have questions about a medical condition or this instruction, always ask your healthcare professional. [facility] disclaims any warranty or liability for your use of this information.       "

## 2025-07-23 NOTE — ASSESSMENT & PLAN NOTE
The patient was noted to have pulmonary hypertension on echocardiogram and this is being monitored by cardiology as well.

## 2025-07-23 NOTE — ASSESSMENT & PLAN NOTE
Etiology for her cardiomyopathy is unclear.  I reviewed the most recent cardiology note and she is being sent for genetic testing.  The patient is adamant that she does not drink alcohol more than once per month making it very unlikely this is related to excess alcohol intake.  It has been suggested that her Adderall could possibly have contributed.  In addition, the patient did have what sounds like a viral syndrome shortly before onset of symptoms.  Continue to follow with cardiology.

## 2025-07-23 NOTE — PROGRESS NOTES
Preventive Care Visit  [facility]  SIRIA JACKSON MD, Family Medicine  Jul 23, 2025      Assessment & Plan   Assessment & Plan  Routine general medical examination at a health care facility  The patient is up-to-date on her cancer screening.  She is up-to-date on her tetanus vaccine and declines COVID.       Acute systolic congestive heart failure (H)  The patient continues to have a cough as well as shortness of breath with exertion related to her new diagnosis of cardiomyopathy and resulting systolic heart failure.  She is on a medication regimen currently which includes Lasix 20 mg, losartan 25 mg, metoprolol XL 50 mg, Jardiance 10mg as well as spironolactone.  She is following up closely with cardiology and will be starting cardiac rehab soon.       Other hyperlipidemia  Reviewed most recent lipid panel.  She does not need active treatment for this at this time.       Bradycardia  When initially roomed, she was noted to have a pulse of 32 bpm.  An EKG was performed which demonstrated normal heart rate.  Orders:    EKG 12-lead, tracing only    Nonischemic cardiomyopathy (H)  Etiology for her cardiomyopathy is unclear.  I reviewed the most recent cardiology note and she is being sent for genetic testing.  The patient is adamant that she does not drink alcohol more than once per month making it very unlikely this is related to excess alcohol intake.  It has been suggested that her Adderall could possibly have contributed.  In addition, the patient did have what sounds like a viral syndrome shortly before onset of symptoms.  Continue to follow with cardiology.       Pulmonary hypertension (H)  The patient was noted to have pulmonary hypertension on echocardiogram and this is being monitored by cardiology as well.       ADHD (attention deficit hyperactivity disorder), combined type  The patient is really struggling being off her ADHD medications in terms of focus.  However, cardiology  has been adamant that she cannot go back on stimulants.  We did talk about Strattera as a possibility, however, in reviewing the side effect profile it looks like there are some possible cardiac side effects so I recommended holding off on this as well until cardiology feels comfortable with her giving this a try.       Generalized anxiety disorder  Continue on Effexor XR 37.5 mg daily and Xanax as needed.       Insomnia due to other mental disorder  Continues on Ambien 5 mg at bedtime as needed.       Perioral dermatitis  Refill given on metronidazole cream for her perioral dermatitis which has been flaring recently.  Orders:    metroNIDAZOLE (METROCREAM) 0.75 % external cream; Apply topically 2 times daily.      Counseling  Appropriate preventive services were addressed with this patient via screening, questionnaire, or discussion as appropriate for fall prevention, nutrition, physical activity, Tobacco-use cessation, social engagement, weight loss and cognition.  Checklist reviewing preventive services available has been given to the patient.  Reviewed patient's diet, addressing concerns and/or questions.   She is at risk for lack of exercise and has been provided with information to increase physical activity for the benefit of her well-being.           Mely Bloom is a 45 year old who presents today for an annual physical exam.  The patient was recently hospitalized due to shortness of breath with exertion and ultimately was found to have acute congestive heart failure secondary to dilated cardiomyopathy.  The cause for her cardiomyopathy has not yet been elucidated although there have been a couple of suggested etiologies.  She continues to follow closely with cardiology and mentions that she will be starting cardiac rehabilitation.  The patient expresses that she felt a lot of support from friends and that she is doing okay at this time.  Tolerating all her medication well.  She does note that she is  having significant issues with ability to focus and organize her thoughts since being off of her Adderall.  Continues to feel shortness of breath with exertion.  She is able to lie flat in bed to sleep.  She also continues to have a cough.  Physical        7/23/2025    12:59 PM   Additional Questions   Roomed by as   Accompanied by self         7/23/2025    12:59 PM   Patient Reported Additional Medications   Patient reports taking the following new medications no        Advance Care Planning            7/23/2025   General Health   How would you rate your overall physical health? (!) DECLINE   Feel stress (tense, anxious, or unable to sleep) Patient declined         7/23/2025   Nutrition   Three or more servings of calcium each day? (!) I DON'T KNOW   Diet: Low salt   How many servings of fruit and vegetables per day? (!) 2-3   How many sweetened beverages each day? 0-1         7/23/2025   Exercise   Days per week of moderate/strenous exercise 1 day   (!) EXERCISE CONCERN      7/23/2025   Social Factors   Frequency of gathering with friends or relatives Once a week   Worry food won't last until get money to buy more Patient declined   Food not last or not have enough money for food? Patient declined   Do you have housing? (Housing is defined as stable permanent housing and does not include staying outside in a car, in a tent, in an abandoned building, in an overnight shelter, or couch-surfing.) Patient declined   Are you worried about losing your housing? Patient declined   Lack of transportation? Patient declined   Unable to get utilities (heat,electricity)? Patient declined         7/23/2025   Dental   Dentist two times every year? Yes           Today's PHQ-2 Score:       1/16/2025     7:55 AM   PHQ-2 ( 1999 [employer])   Q1: Little interest or pleasure in doing things 1   Q2: Feeling down, depressed or hopeless 1   PHQ-2 Score 2    Q1: Little interest or pleasure in doing things Several days   Q2: Feeling down,  depressed or hopeless Several days   PHQ-2 Score 2       Patient-reported         7/23/2025   Substance Use   Alcohol more than 3/day or more than 7/wk Not Applicable   Do you use any other substances recreationally? No     Social History     Tobacco Use    Smoking status: Never    Smokeless tobacco: Never   Vaping Use    Vaping status: Never Used           5/2/2022   LAST FHS-7 RESULTS   1st degree relative breast or ovarian cancer No    Any relative bilateral breast cancer No    Any male have breast cancer No    Any ONE woman have BOTH breast AND ovarian cancer No    Any woman with breast cancer before 50yrs No    2 or more relatives with breast AND/OR ovarian cancer No    2 or more relatives with breast AND/OR bowel cancer No        Proxy-reported        Mammogram Screening - Mammogram every 1-2 years updated in Health Maintenance based on mutual decision making        7/23/2025   STI Screening   New sexual partner(s) since last STI/HIV test? (!) YES      History of abnormal Pap smear: No - age 30- 64 PAP with HPV every 5 years recommended        Latest Ref Rng & Units 2/1/2021    10:09 AM 11/16/2015    12:00 PM   PAP / HPV   PAP Negative for squamous intraepithelial lesion or malignancy. Negative for squamous intraepithelial lesion or malignancy  Electronically signed by Mayte Engle CT (ASCP) on 2/10/2021 at 11:38 AM    Negative for squamous intraepithelial lesion or malignancy  Electronically signed by Priti Pichardo CT (ASCP) on 12/2/2015 at  3:55 PM      HPV 16 DNA NEG Negative     HPV 18 DNA NEG Negative     Other HR HPV NEG Negative       ASCVD Risk   The 10-year ASCVD risk score (Иван DK, et al., 2019) is: 0.8%    Values used to calculate the score:      Age: 45 years      Sex: Female      Is Non- : No      Diabetic: No      Tobacco smoker: No      Systolic Blood Pressure: 126 mmHg      Is BP treated: No      HDL Cholesterol: 59 mg/dL      Total Cholesterol: 203  mg/dL        7/23/2025   Contraception/Family Planning   Questions about contraception or family planning (!) DECLINE   What are your periods like? (!) SPOTTING        Reviewed and updated as needed this visit by Provider                    Patient Active Problem List   Diagnosis    Generalized anxiety disorder    Other hyperlipidemia    Dysplastic Nevus    Insomnia    PMS (premenstrual syndrome)    ADHD (attention deficit hyperactivity disorder), combined type    Cervical radiculopathy    Acute systolic congestive heart failure (H)    Nonischemic cardiomyopathy (H)    Frequent PVCs    Pulmonary hypertension (H)    Perioral dermatitis     Past Surgical History:   Procedure Laterality Date    RUST APPENDECTOMY      Description: Appendectomy;  Recorded: 03/12/2010;       Social History     Tobacco Use    Smoking status: Never    Smokeless tobacco: Never   Substance Use Topics    Alcohol use: Not on file     No family history on file.      Current Outpatient Medications   Medication Sig Dispense Refill    metroNIDAZOLE (METROCREAM) 0.75 % external cream Apply topically 2 times daily. 45 g 1    ALPRAZolam (XANAX) 1 MG tablet Take 1/2 (one-half) tablet by mouth twice daily 34 tablet 0    empagliflozin (JARDIANCE) 10 MG TABS tablet Take 10 mg by mouth daily.      furosemide (LASIX) 20 MG tablet Take 1 tablet (20 mg) by mouth daily. 30 tablet 1    levonorgestrel (MIRENA) 20 MCG/24HR IUD 1 each (20 mcg) by Intrauterine route once      losartan (COZAAR) 25 MG tablet Take 50 mg by mouth daily.      metoprolol succinate ER (TOPROL XL) 50 MG 24 hr tablet Take 50 mg by mouth daily.      metroNIDAZOLE (METROGEL) 0.75 % vaginal gel INSERT 1 APPLICATORFUL VAGINALLY  TWICE A WEEK 70 g 0    multivitamin therapeutic tablet [MULTIVITAMIN THERAPEUTIC TABLET] Take 1 tablet by mouth daily.      spironolactone (ALDACTONE) 25 MG tablet Take 25 mg by mouth daily.      venlafaxine (EFFEXOR XR) 37.5 MG 24 hr capsule Take 1 capsule (37.5 mg) by  "mouth daily. 90 capsule 1    zolpidem (AMBIEN) 5 MG tablet TAKE 1 TABLET BY MOUTH NIGHTLY AS NEEDED 15 tablet 2     No Known Allergies         Objective    Exam  Pulse (!) 32   Temp 98.2  F (36.8  C) (Oral)   Resp 14   Ht 1.702 m (5' 7\")   Wt 68 kg (150 lb)   SpO2 98%   BMI 23.49 kg/m     Estimated body mass index is 23.49 kg/m  as calculated from the following:    Height as of this encounter: 1.702 m (5' 7\").    Weight as of this encounter: 68 kg (150 lb).    Physical Exam  GENERAL: alert and no distress  EYES: Eyes grossly normal to inspection, PERRL and conjunctivae and sclerae normal  HENT: ear canals and TM's normal, nose and mouth without ulcers or lesions  NECK: no adenopathy, no asymmetry, masses, or scars  RESP: lungs clear to auscultation - no rales, rhonchi or wheezes  BREAST: normal without masses, tenderness or nipple discharge and no palpable axillary masses or adenopathy  CV: regular rate and rhythm, normal S1 S2, no S3 or S4, no murmur, click or rub, no peripheral edema  ABDOMEN: soft, nontender, no hepatosplenomegaly, no masses and bowel sounds normal  MS: no gross musculoskeletal defects noted, no edema  SKIN: no suspicious lesions or rashes  NEURO: Normal strength and tone, mentation intact and speech normal  PSYCH: mentation appears normal, affect normal/bright    EKG: Sinus rhythm with frequent Premature ventricular complexes   Biatrial enlargement   Left axis deviation   ST & T wave abnormality, consider anterolateral ischemia   Abnormal ECG   No previous ECGs available     The longitudinal plan of care for the diagnosis(es)/condition(s) as documented were addressed during this visit. Due to the added complexity in care, I will continue to support Merle in the subsequent management and with ongoing continuity of care.    Signed Electronically by: SIRIA JACKSON MD    "

## 2025-07-23 NOTE — ASSESSMENT & PLAN NOTE
The patient is really struggling being off her ADHD medications in terms of focus.  However, cardiology has been adamant that she cannot go back on stimulants.  We did talk about Strattera as a possibility, however, in reviewing the side effect profile it looks like there are some possible cardiac side effects so I recommended holding off on this as well until cardiology feels comfortable with her giving this a try.

## 2025-07-23 NOTE — PROGRESS NOTES
Preventive Care Visit  [facility]  SIRIA JACKSON MD, Family Medicine  Jul 23, 2025  {Provider  Link to SmartSet :239128}    {PROVIDER CHARTING PREFERENCE:303293}    Mely Bloom is a 45 year old, presenting for the following:  Physical        7/23/2025    12:59 PM   Additional Questions   Roomed by as   Accompanied by self         7/23/2025    12:59 PM   Patient Reported Additional Medications   Patient reports taking the following new medications no          HPI  ***   {MA/LPN/RN Pre-Provider Visit Orders- hCG/UA/Strep (Optional):397683}  {SUPERLIST (Optional):934563}  {additonal problems for provider to add (Optional):321127}  Advance Care Planning  {The storyboard will display whether the patient has ACP docs on file. Hover over the Code section in the storyboard to access the ACP documents. :459386}  Patient states has Health Care Directive and will send to [employer].        7/23/2025   General Health   How would you rate your overall physical health? (!) DECLINE         7/23/2025   Nutrition   Three or more servings of calcium each day? (!) I DON'T KNOW   Diet: Low salt   How many servings of fruit and vegetables per day? (!) 2-3   How many sweetened beverages each day? 0-1         7/23/2025   Exercise   Days per week of moderate/strenous exercise 1 day         7/23/2025   Social Factors   Frequency of gathering with friends or relatives Once a week         7/23/2025   Dental   Dentist two times every year? Yes       {Rooming Staff Patient needs a PHQ as part of the AWV.  Use this link to complete and then refresh the note to pull results Link to PHQ2 Assessment :808452}  {USE TO PULL IN PHQ RESULTS FOR TODAY:190608}      7/18/2024   Substance Use   Alcohol more than 3/day or more than 7/wk Not Applicable   Do you use any other substances recreationally? No     Social History     Tobacco Use    Smoking status: Never    Smokeless tobacco: Never   Vaping Use     Vaping status: Never Used     {Provider  If there are gaps in the social history shown above, please follow the link to update and then refresh the note Link to Social and Substance History :258047}      5/2/2022   LAST FHS-7 RESULTS   1st degree relative breast or ovarian cancer No    Any relative bilateral breast cancer No    Any male have breast cancer No    Any ONE woman have BOTH breast AND ovarian cancer No    Any woman with breast cancer before 50yrs No    2 or more relatives with breast AND/OR ovarian cancer No    2 or more relatives with breast AND/OR bowel cancer No        Proxy-reported     {If any of the questions to the FHS7 are answered yes, consider referral for genetic counseling.    Additional indications for genetic referral include personal history of breast or ovarian cancer, genetic mutation in 1st degree relative which increases risk of breast cancer including BRCA1, BRCA2, SIDNEY, PALB 2, TP53, CHEK2, PTEN, CDH1, STK11 (per ACS) and/or 1st degree relative with history of pancreatic or high-risk prostate cancer (per NCCN):273391}   {Mammogram Decision Support (Optional):922681}      History of abnormal Pap smear: { :291442}        Latest Ref Rng & Units 2/1/2021    10:09 AM 11/16/2015    12:00 PM   PAP / HPV   PAP Negative for squamous intraepithelial lesion or malignancy. Negative for squamous intraepithelial lesion or malignancy  Electronically signed by Mayte Engle CT (ASCP) on 2/10/2021 at 11:38 AM    Negative for squamous intraepithelial lesion or malignancy  Electronically signed by Priti Pichardo CT (ASCP) on 12/2/2015 at  3:55 PM      HPV 16 DNA NEG Negative     HPV 18 DNA NEG Negative     Other HR HPV NEG Negative       ASCVD Risk   The 10-year ASCVD risk score (Иван DAVIDSON, et al., 2019) is: 0.8%    Values used to calculate the score:      Age: 45 years      Sex: Female      Is Non- : No      Diabetic: No      Tobacco smoker: No      Systolic Blood  "Pressure: 126 mmHg      Is BP treated: No      HDL Cholesterol: 59 mg/dL      Total Cholesterol: 203 mg/dL        7/18/2024   Contraception/Family Planning   Questions about contraception or family planning No     {Provider  REQUIRED FOR AWV Use the storyboard to review patient history, after sections have been marked as reviewed, refresh note to capture documentation:143794}   Reviewed and updated as needed this visit by Provider                    {HISTORY OPTIONS (Optional):638853}    {ROS Picklists (Optional):950467}     Objective    Exam  Pulse (!) 32   Temp 98.2  F (36.8  C) (Oral)   Resp 14   Ht 1.702 m (5' 7\")   Wt 68 kg (150 lb)   SpO2 98%   BMI 23.49 kg/m     Estimated body mass index is 23.49 kg/m  as calculated from the following:    Height as of this encounter: 1.702 m (5' 7\").    Weight as of this encounter: 68 kg (150 lb).    Physical Exam  {Exam Choices (Optional):752389}        Signed Electronically by: SIRIA JACKSON MD  {Email feedback regarding this note to [email or web address]   :597628}  "

## 2025-07-24 ENCOUNTER — MYC MEDICAL ADVICE (OUTPATIENT)
Dept: FAMILY MEDICINE | Facility: CLINIC | Age: 45
End: 2025-07-24
Payer: COMMERCIAL

## 2025-07-24 ENCOUNTER — TRANSFERRED RECORDS (OUTPATIENT)
Dept: HEALTH INFORMATION MANAGEMENT | Facility: CLINIC | Age: 45
End: 2025-07-24
Payer: COMMERCIAL

## 2025-07-24 PROBLEM — L71.0 PERIORAL DERMATITIS: Status: ACTIVE | Noted: 2025-07-24

## 2025-07-24 RX ORDER — ALPRAZOLAM 1 MG/1
0.5 TABLET ORAL
Qty: 34 TABLET | Refills: 0 | Status: SHIPPED | OUTPATIENT
Start: 2025-07-24

## 2025-07-24 RX ORDER — VENLAFAXINE HYDROCHLORIDE 37.5 MG/1
37.5 CAPSULE, EXTENDED RELEASE ORAL DAILY
Qty: 90 CAPSULE | Refills: 0 | OUTPATIENT
Start: 2025-07-24

## 2025-07-24 NOTE — ASSESSMENT & PLAN NOTE
Refill given on metronidazole cream for her perioral dermatitis which has been flaring recently.  Orders:    metroNIDAZOLE (METROCREAM) 0.75 % external cream; Apply topically 2 times daily.

## 2025-07-25 NOTE — TELEPHONE ENCOUNTER
"Incoming call from [facility]. Pharmacist questions how patient is taking alprazolam as last filled on 7/3 for 34 day supply. Patient will be out on 7/28.     Call to patient to inquire on how she is taking. Patient states she takes 1 tab/day, however if flying, she will take an additional 3 tabs for anxiety related to flying. Patient states she has also had a very stressful month in regards to her health. She does not admit to overusing medication.  She is fearful of running out of alprazolam after a previous experience of intense full body shakes and jaw clenching. Patient states she would likely check herself into an ER if she had to go through that again. She would appreciate an updated script be sent to include the \"max of 4 tabs daily\" to allow for additional use when flying.   "

## 2025-07-25 NOTE — TELEPHONE ENCOUNTER
Outgoing call to [facility] - per patients request. Pharmacist stated they are unable to refill this medication at this time due to their controlled substance policy. The pharmacy can dispense this medication on 08/04/2025.  Lolay message sent to patient with this updated information.      Soraya Metzger RN

## 2025-07-28 NOTE — TELEPHONE ENCOUNTER
Called [facility] and spoke with pharmacistGia - relayed below message from Dr. Fernandez as written.  Pharmacist will fill prescription as directed.        Kusum Marlow RN  [facility]

## 2025-07-28 NOTE — TELEPHONE ENCOUNTER
This patient needs to schedule a telephone or video visit to discuss alprazolam in the very near future.

## 2025-07-28 NOTE — TELEPHONE ENCOUNTER
Please call and approve early refill of her alprazolam.  I will have her come in on August 14 to discuss

## 2025-07-28 NOTE — TELEPHONE ENCOUNTER
Patient returned call. Scheduled for virtual visit 8/13/25.     She reports pharmacy needs early fill approval for alprazolam prescription.

## 2025-08-13 ENCOUNTER — VIRTUAL VISIT (OUTPATIENT)
Dept: FAMILY MEDICINE | Facility: CLINIC | Age: 45
End: 2025-08-13
Payer: COMMERCIAL

## 2025-08-13 DIAGNOSIS — F41.1 GENERALIZED ANXIETY DISORDER: Primary | ICD-10-CM

## 2025-08-13 DIAGNOSIS — F40.243 ANXIETY WITH FLYING: ICD-10-CM

## 2025-08-13 PROCEDURE — 98005 SYNCH AUDIO-VIDEO EST LOW 20: CPT | Performed by: FAMILY MEDICINE

## 2025-08-13 ASSESSMENT — ANXIETY QUESTIONNAIRES
GAD7 TOTAL SCORE: 21
4. TROUBLE RELAXING: NEARLY EVERY DAY
7. FEELING AFRAID AS IF SOMETHING AWFUL MIGHT HAPPEN: NEARLY EVERY DAY
5. BEING SO RESTLESS THAT IT IS HARD TO SIT STILL: NEARLY EVERY DAY
IF YOU CHECKED OFF ANY PROBLEMS ON THIS QUESTIONNAIRE, HOW DIFFICULT HAVE THESE PROBLEMS MADE IT FOR YOU TO DO YOUR WORK, TAKE CARE OF THINGS AT HOME, OR GET ALONG WITH OTHER PEOPLE: EXTREMELY DIFFICULT
7. FEELING AFRAID AS IF SOMETHING AWFUL MIGHT HAPPEN: NEARLY EVERY DAY
6. BECOMING EASILY ANNOYED OR IRRITABLE: NEARLY EVERY DAY
8. IF YOU CHECKED OFF ANY PROBLEMS, HOW DIFFICULT HAVE THESE MADE IT FOR YOU TO DO YOUR WORK, TAKE CARE OF THINGS AT HOME, OR GET ALONG WITH OTHER PEOPLE?: EXTREMELY DIFFICULT
1. FEELING NERVOUS, ANXIOUS, OR ON EDGE: NEARLY EVERY DAY
GAD7 TOTAL SCORE: 21
GAD7 TOTAL SCORE: 21
2. NOT BEING ABLE TO STOP OR CONTROL WORRYING: NEARLY EVERY DAY
3. WORRYING TOO MUCH ABOUT DIFFERENT THINGS: NEARLY EVERY DAY

## 2025-08-13 ASSESSMENT — PATIENT HEALTH QUESTIONNAIRE - PHQ9
10. IF YOU CHECKED OFF ANY PROBLEMS, HOW DIFFICULT HAVE THESE PROBLEMS MADE IT FOR YOU TO DO YOUR WORK, TAKE CARE OF THINGS AT HOME, OR GET ALONG WITH OTHER PEOPLE: SOMEWHAT DIFFICULT
SUM OF ALL RESPONSES TO PHQ QUESTIONS 1-9: 19
SUM OF ALL RESPONSES TO PHQ QUESTIONS 1-9: 19

## 2025-08-20 DIAGNOSIS — F41.1 GENERALIZED ANXIETY DISORDER: ICD-10-CM

## 2025-08-21 RX ORDER — ALPRAZOLAM 1 MG/1
0.5 TABLET ORAL
Qty: 34 TABLET | Refills: 0 | Status: SHIPPED | OUTPATIENT
Start: 2025-08-21

## 2025-08-25 ENCOUNTER — MYC MEDICAL ADVICE (OUTPATIENT)
Dept: FAMILY MEDICINE | Facility: CLINIC | Age: 45
End: 2025-08-25
Payer: COMMERCIAL

## 2025-08-25 DIAGNOSIS — F41.1 GENERALIZED ANXIETY DISORDER: ICD-10-CM

## 2025-08-25 DIAGNOSIS — F40.243 FEAR OF FLYING: Primary | ICD-10-CM

## 2025-08-27 RX ORDER — ALPRAZOLAM 2 MG/1
1 TABLET ORAL EVERY 6 HOURS PRN
Qty: 3 TABLET | Refills: 0 | Status: SHIPPED | OUTPATIENT
Start: 2025-08-27